# Patient Record
Sex: MALE | Race: OTHER | HISPANIC OR LATINO | URBAN - METROPOLITAN AREA
[De-identification: names, ages, dates, MRNs, and addresses within clinical notes are randomized per-mention and may not be internally consistent; named-entity substitution may affect disease eponyms.]

---

## 2017-01-12 ENCOUNTER — EMERGENCY (EMERGENCY)
Facility: HOSPITAL | Age: 37
LOS: 1 days | Discharge: HOME | End: 2017-01-12
Attending: EMERGENCY MEDICINE | Admitting: EMERGENCY MEDICINE
Payer: COMMERCIAL

## 2017-01-12 VITALS
DIASTOLIC BLOOD PRESSURE: 78 MMHG | RESPIRATION RATE: 18 BRPM | SYSTOLIC BLOOD PRESSURE: 126 MMHG | HEART RATE: 107 BPM | OXYGEN SATURATION: 97 % | HEIGHT: 71 IN | WEIGHT: 216.05 LBS | TEMPERATURE: 99 F

## 2017-01-12 DIAGNOSIS — R05 COUGH: ICD-10-CM

## 2017-01-12 LAB
ALBUMIN SERPL ELPH-MCNC: 2.2 G/DL — LOW (ref 3.3–5)
ALP SERPL-CCNC: 256 U/L — HIGH (ref 40–120)
ALT FLD-CCNC: 28 U/L RC — SIGNIFICANT CHANGE UP (ref 10–45)
ANION GAP SERPL CALC-SCNC: 14 MMOL/L — SIGNIFICANT CHANGE UP (ref 5–17)
APTT BLD: 33.4 SEC — SIGNIFICANT CHANGE UP (ref 27.5–37.4)
AST SERPL-CCNC: 23 U/L — SIGNIFICANT CHANGE UP (ref 10–40)
BASE EXCESS BLDV CALC-SCNC: 1.9 MMOL/L — SIGNIFICANT CHANGE UP (ref -2–2)
BASOPHILS # BLD AUTO: 0 K/UL — SIGNIFICANT CHANGE UP (ref 0–0.2)
BASOPHILS NFR BLD AUTO: 0.1 % — SIGNIFICANT CHANGE UP (ref 0–2)
BILIRUB SERPL-MCNC: 0.4 MG/DL — SIGNIFICANT CHANGE UP (ref 0.2–1.2)
BUN SERPL-MCNC: 19 MG/DL — SIGNIFICANT CHANGE UP (ref 7–23)
CA-I SERPL-SCNC: 1.13 MMOL/L — SIGNIFICANT CHANGE UP (ref 1.12–1.3)
CALCIUM SERPL-MCNC: 8.5 MG/DL — SIGNIFICANT CHANGE UP (ref 8.4–10.5)
CHLORIDE BLDV-SCNC: 102 MMOL/L — SIGNIFICANT CHANGE UP (ref 96–108)
CHLORIDE SERPL-SCNC: 98 MMOL/L — SIGNIFICANT CHANGE UP (ref 96–108)
CO2 BLDV-SCNC: 27 MMOL/L — SIGNIFICANT CHANGE UP (ref 22–30)
CO2 SERPL-SCNC: 24 MMOL/L — SIGNIFICANT CHANGE UP (ref 22–31)
CREAT SERPL-MCNC: 0.96 MG/DL — SIGNIFICANT CHANGE UP (ref 0.5–1.3)
EOSINOPHIL # BLD AUTO: 0 K/UL — SIGNIFICANT CHANGE UP (ref 0–0.5)
EOSINOPHIL NFR BLD AUTO: 0.2 % — SIGNIFICANT CHANGE UP (ref 0–6)
GAS PNL BLDV: 134 MMOL/L — LOW (ref 136–145)
GAS PNL BLDV: SIGNIFICANT CHANGE UP
GLUCOSE BLDV-MCNC: 104 MG/DL — HIGH (ref 70–99)
GLUCOSE SERPL-MCNC: 94 MG/DL — SIGNIFICANT CHANGE UP (ref 70–99)
HCO3 BLDV-SCNC: 26 MMOL/L — SIGNIFICANT CHANGE UP (ref 21–29)
HCOV 229E RNA SPEC QL NAA+PROBE: DETECTED
HCT VFR BLD CALC: 36.2 % — LOW (ref 39–50)
HCT VFR BLDA CALC: 35 % — LOW (ref 39–50)
HGB BLD CALC-MCNC: 11.2 G/DL — LOW (ref 13–17)
HGB BLD-MCNC: 11.9 G/DL — LOW (ref 13–17)
INR BLD: 1.63 RATIO — HIGH (ref 0.88–1.16)
LACTATE BLDV-MCNC: 1.5 MMOL/L — SIGNIFICANT CHANGE UP (ref 0.7–2)
LYMPHOCYTES # BLD AUTO: 1 K/UL — SIGNIFICANT CHANGE UP (ref 1–3.3)
LYMPHOCYTES # BLD AUTO: 7.9 % — LOW (ref 13–44)
MCHC RBC-ENTMCNC: 25.4 PG — LOW (ref 27–34)
MCHC RBC-ENTMCNC: 32.8 GM/DL — SIGNIFICANT CHANGE UP (ref 32–36)
MCV RBC AUTO: 77.4 FL — LOW (ref 80–100)
MONOCYTES # BLD AUTO: 0.8 K/UL — SIGNIFICANT CHANGE UP (ref 0–0.9)
MONOCYTES NFR BLD AUTO: 6.6 % — SIGNIFICANT CHANGE UP (ref 2–14)
NEUTROPHILS # BLD AUTO: 10.5 K/UL — HIGH (ref 1.8–7.4)
NEUTROPHILS NFR BLD AUTO: 85.2 % — HIGH (ref 43–77)
PCO2 BLDV: 41 MMHG — SIGNIFICANT CHANGE UP (ref 35–50)
PH BLDV: 7.42 — SIGNIFICANT CHANGE UP (ref 7.35–7.45)
PLATELET # BLD AUTO: 438 K/UL — HIGH (ref 150–400)
PO2 BLDV: 25 MMHG — SIGNIFICANT CHANGE UP (ref 25–45)
POTASSIUM BLDV-SCNC: 3.9 MMOL/L — SIGNIFICANT CHANGE UP (ref 3.5–5)
POTASSIUM SERPL-MCNC: 4.2 MMOL/L — SIGNIFICANT CHANGE UP (ref 3.5–5.3)
POTASSIUM SERPL-SCNC: 4.2 MMOL/L — SIGNIFICANT CHANGE UP (ref 3.5–5.3)
PROT SERPL-MCNC: 7.4 G/DL — SIGNIFICANT CHANGE UP (ref 6–8.3)
PROTHROM AB SERPL-ACNC: 17.9 SEC — HIGH (ref 10–13.1)
RAPID RVP RESULT: DETECTED
RBC # BLD: 4.68 M/UL — SIGNIFICANT CHANGE UP (ref 4.2–5.8)
RBC # FLD: 17.8 % — HIGH (ref 10.3–14.5)
SAO2 % BLDV: 37 % — LOW (ref 67–88)
SODIUM SERPL-SCNC: 136 MMOL/L — SIGNIFICANT CHANGE UP (ref 135–145)
WBC # BLD: 12.3 K/UL — HIGH (ref 3.8–10.5)
WBC # FLD AUTO: 12.3 K/UL — HIGH (ref 3.8–10.5)

## 2017-01-12 PROCEDURE — 71020: CPT | Mod: 26

## 2017-01-12 PROCEDURE — 93010 ELECTROCARDIOGRAM REPORT: CPT | Mod: NC

## 2017-01-12 PROCEDURE — 71275 CT ANGIOGRAPHY CHEST: CPT | Mod: 26

## 2017-01-12 PROCEDURE — 99285 EMERGENCY DEPT VISIT HI MDM: CPT | Mod: 25

## 2017-01-12 RX ORDER — SODIUM CHLORIDE 9 MG/ML
1000 INJECTION INTRAMUSCULAR; INTRAVENOUS; SUBCUTANEOUS ONCE
Qty: 0 | Refills: 0 | Status: COMPLETED | OUTPATIENT
Start: 2017-01-12 | End: 2017-01-12

## 2017-01-12 RX ORDER — IPRATROPIUM/ALBUTEROL SULFATE 18-103MCG
3 AEROSOL WITH ADAPTER (GRAM) INHALATION ONCE
Qty: 0 | Refills: 0 | Status: COMPLETED | OUTPATIENT
Start: 2017-01-12 | End: 2017-01-12

## 2017-01-12 RX ORDER — CEFTRIAXONE 500 MG/1
1 INJECTION, POWDER, FOR SOLUTION INTRAMUSCULAR; INTRAVENOUS ONCE
Qty: 0 | Refills: 0 | Status: COMPLETED | OUTPATIENT
Start: 2017-01-12 | End: 2017-01-12

## 2017-01-12 RX ORDER — AZITHROMYCIN 500 MG/1
500 TABLET, FILM COATED ORAL ONCE
Qty: 0 | Refills: 0 | Status: COMPLETED | OUTPATIENT
Start: 2017-01-12 | End: 2017-01-12

## 2017-01-12 RX ORDER — ACETAMINOPHEN 500 MG
650 TABLET ORAL ONCE
Qty: 0 | Refills: 0 | Status: COMPLETED | OUTPATIENT
Start: 2017-01-12 | End: 2017-01-12

## 2017-01-12 RX ORDER — KETOROLAC TROMETHAMINE 30 MG/ML
15 SYRINGE (ML) INJECTION ONCE
Qty: 0 | Refills: 0 | Status: DISCONTINUED | OUTPATIENT
Start: 2017-01-12 | End: 2017-01-12

## 2017-01-12 RX ADMIN — SODIUM CHLORIDE 1000 MILLILITER(S): 9 INJECTION INTRAMUSCULAR; INTRAVENOUS; SUBCUTANEOUS at 22:09

## 2017-01-12 RX ADMIN — CEFTRIAXONE 100 GRAM(S): 500 INJECTION, POWDER, FOR SOLUTION INTRAMUSCULAR; INTRAVENOUS at 22:07

## 2017-01-12 RX ADMIN — Medication 650 MILLIGRAM(S): at 22:07

## 2017-01-12 RX ADMIN — SODIUM CHLORIDE 3000 MILLILITER(S): 9 INJECTION INTRAMUSCULAR; INTRAVENOUS; SUBCUTANEOUS at 18:31

## 2017-01-12 RX ADMIN — AZITHROMYCIN 250 MILLIGRAM(S): 500 TABLET, FILM COATED ORAL at 22:51

## 2017-01-12 RX ADMIN — Medication 3 MILLILITER(S): at 18:31

## 2017-01-12 NOTE — ED PROVIDER NOTE - MEDICAL DECISION MAKING DETAILS
35 year old male patient with URI symptoms with multiple large lymph nodes. Concerning for lymphoma. Will check cbc, chest x-ray, give nebs and IV fluids for symptoms. Patient has follow up with PMD arranged and had recent admission with workup with lymphoma. Has not seen oncologist yet. Disposition pending.

## 2017-01-12 NOTE — ED PROVIDER NOTE - OBJECTIVE STATEMENT
36 year old male patient presents to the ED c/o shortness of breath, chest tightness, fever Tmax 101, worsening cough for a few days. Today, his coughing was worse than usual and it made breathing difficult and a sensation of chest tightness. Cough is productive with clear phlegm. Denies sick contacts. Patient has swollen lymph nodes. [facility] liver function tests and concern for lymphoma. Patient did not get flu vaccine this year. Patient reports extensive family history of lymphoma.  Denies muscle ache. 36 year old male patient presents to the ED c/o shortness of breath, chest tightness, sob fever Tmax 101, worsening cough for 3 days. Today, his coughing was worse than usual and it made breathing difficult and a sensation of chest tightness. Cough is productive with clear phlegm. Denies sick contacts. Patient has swollen lymph nodes. [facility] liver function tests and concern for lymphoma. Was supposed to follow-up with PMD tomorrow for eval and oncology referral. Patient did not get flu vaccine this year. Patient reports extensive family history of lymphoma.  Denies muscle ache, n/v/d, chest pain.

## 2017-01-12 NOTE — ED PROVIDER NOTE - CARE PLAN
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

## 2017-01-12 NOTE — ED PROVIDER NOTE - NS ED MD SCRIBE ATTENDING SCRIBE SECTIONS
PHYSICAL EXAM/HIV/REVIEW OF SYSTEMS/PAST MEDICAL/SURGICAL/SOCIAL HISTORY/VITAL SIGNS( Pullset)/DISPOSITION/RESULTS/HISTORY OF PRESENT ILLNESS

## 2017-01-12 NOTE — ED ADULT NURSE NOTE - OBJECTIVE STATEMENT
36y m pt c/o 5 days of cough; pt states had appt with pmd tomorrow but coughed so hard today he couldn't breathe; aox3; no resp distress; pt reports sob when coughing; pt denies abd pain; abd soft nontender nondistended; pt denies n/v/d; 36y m pt c/o 5 days of cough; pt states had appt with pmd tomorrow but coughed so hard today he couldn't breathe; aox3; no resp distress; pt reports sob when coughing; pt denies abd pain; abd soft nontender nondistended; pt denies n/v/d; pt reports productive cough with clear phlegm; pt denies sick contacts; has been worked up for swollen lymph nodes and elevated liver enzymes; wife at bedside; iv started; labs sent; skin intact

## 2017-01-12 NOTE — ED PROVIDER NOTE - PROGRESS NOTE DETAILS
Dr. Asher Note: s/o from Dr. Burnham pending labs and cxr.  CXR is abnormal showing b/l pleural effusion and pul infiltrate.  Will obtain CTA-chest, obtain further labs, and check rvp.  Pt has undifferentiated new dx lymphoma, no oncologist yet.  Pt and wife updated.  Will start empiric antibx in meantime and give gentle hydration given concern with effusions. Dr. Asher Note: pt feeling better, stable, reviewed ct scan, already has f/u for onc issues, do not suspect empyema, pt with +coronavirus, will give dose of decadron and benadryl for sxs relief, stable for dc home.

## 2017-01-13 VITALS
HEART RATE: 106 BPM | OXYGEN SATURATION: 97 % | DIASTOLIC BLOOD PRESSURE: 78 MMHG | SYSTOLIC BLOOD PRESSURE: 121 MMHG | RESPIRATION RATE: 18 BRPM

## 2017-01-13 PROCEDURE — 85610 PROTHROMBIN TIME: CPT

## 2017-01-13 PROCEDURE — 87486 CHLMYD PNEUM DNA AMP PROBE: CPT

## 2017-01-13 PROCEDURE — 94640 AIRWAY INHALATION TREATMENT: CPT

## 2017-01-13 PROCEDURE — 87798 DETECT AGENT NOS DNA AMP: CPT

## 2017-01-13 PROCEDURE — 87040 BLOOD CULTURE FOR BACTERIA: CPT

## 2017-01-13 PROCEDURE — 71046 X-RAY EXAM CHEST 2 VIEWS: CPT

## 2017-01-13 PROCEDURE — 71275 CT ANGIOGRAPHY CHEST: CPT

## 2017-01-13 PROCEDURE — 82435 ASSAY OF BLOOD CHLORIDE: CPT

## 2017-01-13 PROCEDURE — 85027 COMPLETE CBC AUTOMATED: CPT

## 2017-01-13 PROCEDURE — 82330 ASSAY OF CALCIUM: CPT

## 2017-01-13 PROCEDURE — 85730 THROMBOPLASTIN TIME PARTIAL: CPT

## 2017-01-13 PROCEDURE — 84295 ASSAY OF SERUM SODIUM: CPT

## 2017-01-13 PROCEDURE — 85014 HEMATOCRIT: CPT

## 2017-01-13 PROCEDURE — 82803 BLOOD GASES ANY COMBINATION: CPT

## 2017-01-13 PROCEDURE — 93005 ELECTROCARDIOGRAM TRACING: CPT

## 2017-01-13 PROCEDURE — 87581 M.PNEUMON DNA AMP PROBE: CPT

## 2017-01-13 PROCEDURE — 99284 EMERGENCY DEPT VISIT MOD MDM: CPT | Mod: 25

## 2017-01-13 PROCEDURE — 96375 TX/PRO/DX INJ NEW DRUG ADDON: CPT | Mod: XU

## 2017-01-13 PROCEDURE — 87633 RESP VIRUS 12-25 TARGETS: CPT

## 2017-01-13 PROCEDURE — 83605 ASSAY OF LACTIC ACID: CPT

## 2017-01-13 PROCEDURE — 84132 ASSAY OF SERUM POTASSIUM: CPT

## 2017-01-13 PROCEDURE — 96374 THER/PROPH/DIAG INJ IV PUSH: CPT | Mod: XU

## 2017-01-13 PROCEDURE — 82947 ASSAY GLUCOSE BLOOD QUANT: CPT

## 2017-01-13 PROCEDURE — 80053 COMPREHEN METABOLIC PANEL: CPT

## 2017-01-13 RX ORDER — LORATADINE, PSEUDOEPHEDRINE SULFATE 5; 120 MG/1; MG/1
1 TABLET, FILM COATED, EXTENDED RELEASE ORAL
Qty: 14 | Refills: 0 | OUTPATIENT
Start: 2017-01-13 | End: 2017-01-27

## 2017-01-13 RX ORDER — DEXAMETHASONE 0.5 MG/5ML
10 ELIXIR ORAL ONCE
Qty: 0 | Refills: 0 | Status: COMPLETED | OUTPATIENT
Start: 2017-01-13 | End: 2017-01-13

## 2017-01-13 RX ORDER — DIPHENHYDRAMINE HCL 50 MG
50 CAPSULE ORAL ONCE
Qty: 0 | Refills: 0 | Status: COMPLETED | OUTPATIENT
Start: 2017-01-13 | End: 2017-01-13

## 2017-01-13 RX ADMIN — Medication 50 MILLIGRAM(S): at 00:32

## 2017-01-13 RX ADMIN — Medication 650 MILLIGRAM(S): at 00:32

## 2017-01-13 RX ADMIN — Medication 10 MILLIGRAM(S): at 00:33

## 2017-01-13 NOTE — ED ADULT NURSE REASSESSMENT NOTE - NS ED NURSE REASSESS COMMENT FT1
Pt d/c w/wife at bedside, received written & verbal instructions, Pt verbalized understanding, Iv d/c, site clear no redness or swelling, a&ox4.

## 2017-01-18 LAB
CULTURE RESULTS: SIGNIFICANT CHANGE UP
CULTURE RESULTS: SIGNIFICANT CHANGE UP
SPECIMEN SOURCE: SIGNIFICANT CHANGE UP
SPECIMEN SOURCE: SIGNIFICANT CHANGE UP

## 2017-01-26 ENCOUNTER — INPATIENT (INPATIENT)
Facility: HOSPITAL | Age: 37
LOS: 14 days | Discharge: HOME | DRG: 824 | End: 2017-02-10
Attending: INTERNAL MEDICINE | Admitting: INTERNAL MEDICINE
Payer: COMMERCIAL

## 2017-01-26 VITALS
OXYGEN SATURATION: 94 % | TEMPERATURE: 99 F | DIASTOLIC BLOOD PRESSURE: 87 MMHG | RESPIRATION RATE: 24 BRPM | HEART RATE: 145 BPM | SYSTOLIC BLOOD PRESSURE: 124 MMHG

## 2017-01-26 DIAGNOSIS — C85.90 NON-HODGKIN LYMPHOMA, UNSPECIFIED, UNSPECIFIED SITE: ICD-10-CM

## 2017-01-26 DIAGNOSIS — J90 PLEURAL EFFUSION, NOT ELSEWHERE CLASSIFIED: ICD-10-CM

## 2017-01-26 DIAGNOSIS — D64.9 ANEMIA, UNSPECIFIED: ICD-10-CM

## 2017-01-26 DIAGNOSIS — Z41.8 ENCOUNTER FOR OTHER PROCEDURES FOR PURPOSES OTHER THAN REMEDYING HEALTH STATE: ICD-10-CM

## 2017-01-26 LAB
ALBUMIN SERPL ELPH-MCNC: 2.2 G/DL — LOW (ref 3.3–5)
ALP SERPL-CCNC: 208 U/L — HIGH (ref 40–120)
ALT FLD-CCNC: 29 U/L RC — SIGNIFICANT CHANGE UP (ref 10–45)
ANION GAP SERPL CALC-SCNC: 16 MMOL/L — SIGNIFICANT CHANGE UP (ref 5–17)
APTT BLD: 33 SEC — SIGNIFICANT CHANGE UP (ref 27.5–37.4)
AST SERPL-CCNC: 24 U/L — SIGNIFICANT CHANGE UP (ref 10–40)
BASOPHILS # BLD AUTO: 0 K/UL — SIGNIFICANT CHANGE UP (ref 0–0.2)
BASOPHILS NFR BLD AUTO: 0.1 % — SIGNIFICANT CHANGE UP (ref 0–2)
BILIRUB SERPL-MCNC: 0.3 MG/DL — SIGNIFICANT CHANGE UP (ref 0.2–1.2)
BLD GP AB SCN SERPL QL: NEGATIVE — SIGNIFICANT CHANGE UP
BUN SERPL-MCNC: 17 MG/DL — SIGNIFICANT CHANGE UP (ref 7–23)
CALCIUM SERPL-MCNC: 8.3 MG/DL — LOW (ref 8.4–10.5)
CHLORIDE SERPL-SCNC: 100 MMOL/L — SIGNIFICANT CHANGE UP (ref 96–108)
CO2 SERPL-SCNC: 23 MMOL/L — SIGNIFICANT CHANGE UP (ref 22–31)
CREAT SERPL-MCNC: 0.81 MG/DL — SIGNIFICANT CHANGE UP (ref 0.5–1.3)
EOSINOPHIL # BLD AUTO: 0 K/UL — SIGNIFICANT CHANGE UP (ref 0–0.5)
EOSINOPHIL NFR BLD AUTO: 0.1 % — SIGNIFICANT CHANGE UP (ref 0–6)
GAS PNL BLDV: SIGNIFICANT CHANGE UP
GLUCOSE SERPL-MCNC: 137 MG/DL — HIGH (ref 70–99)
HCT VFR BLD CALC: 38.6 % — LOW (ref 39–50)
HGB BLD-MCNC: 12.9 G/DL — LOW (ref 13–17)
INR BLD: 1.69 RATIO — HIGH (ref 0.88–1.16)
LYMPHOCYTES # BLD AUTO: 0.7 K/UL — LOW (ref 1–3.3)
LYMPHOCYTES # BLD AUTO: 4.2 % — LOW (ref 13–44)
MCHC RBC-ENTMCNC: 26.3 PG — LOW (ref 27–34)
MCHC RBC-ENTMCNC: 33.4 GM/DL — SIGNIFICANT CHANGE UP (ref 32–36)
MCV RBC AUTO: 78.7 FL — LOW (ref 80–100)
MONOCYTES # BLD AUTO: 0.8 K/UL — SIGNIFICANT CHANGE UP (ref 0–0.9)
MONOCYTES NFR BLD AUTO: 4.7 % — SIGNIFICANT CHANGE UP (ref 2–14)
NEUTROPHILS # BLD AUTO: 14.8 K/UL — HIGH (ref 1.8–7.4)
NEUTROPHILS NFR BLD AUTO: 90.9 % — HIGH (ref 43–77)
PLATELET # BLD AUTO: 533 K/UL — HIGH (ref 150–400)
POTASSIUM SERPL-MCNC: 4.4 MMOL/L — SIGNIFICANT CHANGE UP (ref 3.5–5.3)
POTASSIUM SERPL-SCNC: 4.4 MMOL/L — SIGNIFICANT CHANGE UP (ref 3.5–5.3)
PROT SERPL-MCNC: 7.1 G/DL — SIGNIFICANT CHANGE UP (ref 6–8.3)
PROTHROM AB SERPL-ACNC: 18.5 SEC — HIGH (ref 10–13.1)
RBC # BLD: 4.9 M/UL — SIGNIFICANT CHANGE UP (ref 4.2–5.8)
RBC # FLD: 18.5 % — HIGH (ref 10.3–14.5)
RH IG SCN BLD-IMP: POSITIVE — SIGNIFICANT CHANGE UP
RH IG SCN BLD-IMP: POSITIVE — SIGNIFICANT CHANGE UP
SODIUM SERPL-SCNC: 139 MMOL/L — SIGNIFICANT CHANGE UP (ref 135–145)
TROPONIN T SERPL-MCNC: <0.01 NG/ML — SIGNIFICANT CHANGE UP (ref 0–0.06)
WBC # BLD: 16.3 K/UL — HIGH (ref 3.8–10.5)
WBC # FLD AUTO: 16.3 K/UL — HIGH (ref 3.8–10.5)

## 2017-01-26 PROCEDURE — 93308 TTE F-UP OR LMTD: CPT | Mod: 26

## 2017-01-26 PROCEDURE — 71010: CPT | Mod: 26

## 2017-01-26 PROCEDURE — 99291 CRITICAL CARE FIRST HOUR: CPT | Mod: 25

## 2017-01-26 PROCEDURE — 99223 1ST HOSP IP/OBS HIGH 75: CPT

## 2017-01-26 PROCEDURE — 93010 ELECTROCARDIOGRAM REPORT: CPT

## 2017-01-26 PROCEDURE — 71275 CT ANGIOGRAPHY CHEST: CPT | Mod: 26

## 2017-01-26 RX ORDER — FUROSEMIDE 40 MG
40 TABLET ORAL ONCE
Qty: 0 | Refills: 0 | Status: COMPLETED | OUTPATIENT
Start: 2017-01-26 | End: 2017-01-26

## 2017-01-26 RX ORDER — ACETAMINOPHEN 500 MG
650 TABLET ORAL EVERY 6 HOURS
Qty: 0 | Refills: 0 | Status: DISCONTINUED | OUTPATIENT
Start: 2017-01-26 | End: 2017-01-31

## 2017-01-26 RX ORDER — ONDANSETRON 8 MG/1
4 TABLET, FILM COATED ORAL EVERY 6 HOURS
Qty: 0 | Refills: 0 | Status: DISCONTINUED | OUTPATIENT
Start: 2017-01-26 | End: 2017-01-31

## 2017-01-26 RX ORDER — ENOXAPARIN SODIUM 100 MG/ML
40 INJECTION SUBCUTANEOUS EVERY 24 HOURS
Qty: 0 | Refills: 0 | Status: DISCONTINUED | OUTPATIENT
Start: 2017-01-26 | End: 2017-01-31

## 2017-01-26 RX ADMIN — Medication 40 MILLIGRAM(S): at 18:05

## 2017-01-26 NOTE — ED PROVIDER NOTE - ENMT, MLM
Airway patent, Nasal mucosa clear. Mouth with normal mucosa. Throat has no vesicles, no oropharyngeal exudates and uvula is midline. Pale mucosas

## 2017-01-26 NOTE — H&P ADULT. - ASSESSMENT
36M w/ suspected diagnosis of lymphoma based on CTA who presents with constitutional symptoms c/w malignancy and pulmonary symptoms that are likely 2/2 his b/l PLEFs. Although the patient meets SIRS I suspect the tachycardia, leukocytosis and fevers as outpatient are 2/2 his likely malignancy. I have a low clinical suspicion for an infectious process. The duration of his fevers (months) points away from this. His axillary and posterior cervical LAD as well as the LAD seen on CT also are c/w malignancy. I suspect his anemia, low Alb, thrombocytosis are all related to the malignancy. Do not suspect that coronavirus is contributing at this point.

## 2017-01-26 NOTE — H&P ADULT. - FAMILY HISTORY
Sibling  Still living? Yes, Estimated age: Age Unknown  Family history of lymphoma, Age at diagnosis: Age Unknown

## 2017-01-26 NOTE — H&P ADULT. - NEGATIVE NEUROLOGICAL SYMPTOMS
no transient paralysis/no generalized seizures/no focal seizures/no loss of sensation/no paresthesias/no syncope/no difficulty walking

## 2017-01-26 NOTE — H&P ADULT. - CONSTITUTIONAL COMMENTS
Pt sitting up, tachypneic on 2L NC w/ mild accessory muscle use, able to speak in full sentences, in no apparent pain, mild temporal wasting, ill appearing overall

## 2017-01-26 NOTE — H&P ADULT. - PROBLEM SELECTOR PLAN 2
-Patient would likely benefit from diagnostic and therapeutic thoracentesis in am given current resp status  -c/w supplemental O2 for now  -does not meet criteria for acute hypoxic rep failure

## 2017-01-26 NOTE — H&P ADULT. - LYMPHATIC
posterior cervical L/anterior cervical L/supraclavicular L/anterior cervical R/supraclavicular R/posterior cervical R

## 2017-01-26 NOTE — H&P ADULT. - HISTORY OF PRESENT ILLNESS
36M w/ no PMHx, recent concern for lymphoma based on CTA Chest from 1/12/2017 (see below) who presents with ___  ED Triage Vitals: T:98.7, HR:145 (124-145), BP:124/87, RR:24, SpO2:94% on RA, pt placed on 2L NC in ED – SpO2:98% on 2L NC  ED Course: CXR, Lasix 40mg IVP x 1, CTA Chest, Admit to Tele  CTA Chest 1/12/2017: Impression: No pulmonary embolism. Infiltrative soft tissue involving the mediastinum and kaitlin, along with mediastinal, hilar, axillary, and retroperitoneal adenopathy. Bulky bilateral axillary lymphadenopathy. Large right and small to moderate left pleural effusions. 36M w/ no PMHx, recent concern for lymphoma based on CTA Chest from 1/12/2017 (see below) who presents with cough, fever, SOB, weight loss. The time course of the SOB and cough is days. The patient has a +FHx of lymphoma (bother, does not know details) and has night sweats and fevers for the past 6m w/ 20lb weight loss in the past 2m (unintentional). His last measured fever was 101 2 days ago. He came to the ED for weakness, decreased energy, "throwing up water" and feeling that he couldn't breath, RIDER. He has noticed LE swelling and has been unable to sleep laying flat. He denies sick contacts, asbestos exposure or recent travel (last travel was to [place] 2yrs ago). He was [place]. Since being informed about the possible malignancy diagnosis during his last ED visit the patient has not seen a Physician.  ED Triage Vitals: T:98.7, HR:145 (124-145), BP:124/87, RR:24, SpO2:94% on RA, pt placed on 2L NC in ED – SpO2:98% on 2L NC  ED Course: CXR, Lasix 40mg IVP x 1, CTA Chest, Admit to Tele  CTA Chest 1/12/2017: Impression: No pulmonary embolism. Infiltrative soft tissue involving the mediastinum and kaitlin, along with mediastinal, hilar, axillary, and retroperitoneal adenopathy. Bulky bilateral axillary lymphadenopathy. Large right and small to moderate left pleural effusions. 36M w/ no PMHx, recent concern for lymphoma based on CTA Chest from 1/12/2017 (see below) who presents with cough, fever, SOB, weight loss. The time course of the SOB and cough is days. The patient has a +FHx of lymphoma (bother, does not know details) and has night sweats and fevers for the past 6m w/ 20lb weight loss in the past 2m (unintentional) and palpable axillary LAD (nontender). His last measured fever was 101 2 days ago. He came to the ED for weakness, decreased energy, "throwing up water" and feeling that he couldn't breath, RIDER. He has noticed LE swelling and has been unable to sleep laying flat. He denies sick contacts, asbestos exposure or recent travel (last travel was to [place] 2yrs ago). He was born in the [place]. Since being informed about the possible malignancy diagnosis during his last ED visit the patient has not seen a Physician.  ED Triage Vitals: T:98.7, HR:145 (124-145), BP:124/87, RR:24, SpO2:94% on RA, pt placed on 2L NC in ED – SpO2:98% on 2L NC  ED Course: CXR, Lasix 40mg IVP x 1, CTA Chest, Admit to Tele  CTA Chest 1/12/2017: Impression: No pulmonary embolism. Infiltrative soft tissue involving the mediastinum and kaitlin, along with mediastinal, hilar, axillary, and retroperitoneal adenopathy. Bulky bilateral axillary lymphadenopathy. Large right and small to moderate left pleural effusions.

## 2017-01-26 NOTE — ED ADULT NURSE NOTE - OBJECTIVE STATEMENT
37 yo M with newly dx lymphoma and pleural effusions. 37 yo M with newly dx lymphoma and pleural effusion on last ED visit 1/13 PW worsening SOB. 35 yo M with newly dx lymphoma, pleural effusion and URI on last ED visit 1/13 PW worsening SOB. Pt c/o chest discomfort and difficulty breathing. Pt diaphoretic on arrival. Pt a&ox3, sinus tachycardia on the monitor. Lung sounds diminished. Afebrile. Skin color pale. Pt denies fevers/chills/abd pain.    MD at bedside with US, 2 18 gauge IVs in bilateral ACs. Normotensive at this time.

## 2017-01-26 NOTE — H&P ADULT. - RS GEN PE MLT RESP DETAILS PC
diminished breath sounds, L/diminished breath sounds, R/no intercostal retractions/breath sounds equal/airway patent/no rales/no rhonchi

## 2017-01-26 NOTE — ED PROVIDER NOTE - PROGRESS NOTE DETAILS
Bedside US shows large right pleural effusion moderate left sided effusion, hyperdynamic heart with moderate pericardial effusion with no evidence to tamponade. PT much improved with 500cc bolus and lasix. speaking in full sentences, 98 O2 sat on 2 L NC. Do not believe pt needs emergent tube thoracostomy. Spoke to hospitatlist and Dr. Medina. Pt is now admitted. Will continue to monitor. GPATEL

## 2017-01-26 NOTE — ED PROVIDER NOTE - OBJECTIVE STATEMENT
36 year old male concern recently for lymphoma given enlarged nodes, now with worse sob. Has not had a chance to see onc yet. Feeling weak and malaise.

## 2017-01-26 NOTE — H&P ADULT. - PROBLEM SELECTOR PLAN 3
-Suspect this is related to the malignancy  -Based on MCV and RWD possible Dx include Fe def and anemia of chronic inflammation - check Fe studies

## 2017-01-26 NOTE — H&P ADULT. - PROBLEM SELECTOR PLAN 1
-Given accessibility of LAD (axillary) would pursue Onc consult and tissue biopsy  -Need tissue Dx as next step, may also need further imaging to determine extent of LAD (would hold off repeat CT w/ contrast given pt just received contrast)  -Suspect malignancy is the etiology of the patient's fever - will hold off on ABxs for now - even if patient spike, unless clinical setting changes would defer ABxs but would send BCx, UA, UCx  -Check UA now for Prot given low Alb  -Check HIV (d/w patient), LDH, Quant Gold (pt born in )

## 2017-01-26 NOTE — H&P ADULT. - NEUROLOGICAL DETAILS
responds to pain/sensation intact/no spontaneous movement/alert and oriented x 3/responds to verbal commands

## 2017-01-26 NOTE — ED PROVIDER NOTE - ATTENDING CONTRIBUTION TO CARE
35 yo with extreme weakness and fatigue and sha for th epast few days, was seen here 2 weeks for respiratory issue and was diagnosed with large right and small left pleural effusions and mediastinal mass, was told to follow with onc but has not been able to, pt getting increasingly weak. EXAM: pt appears pale extremely weak appearing, diaphoretic, with decreased BS on the right, gauaic neg, tachycardia PLAN: likely sob 2ndary pleural effusion r.o low H/H pericardial effusion. labs, xray, Consider therapeutic tube thoracostomy for relief. Will continue to follow the patient closely

## 2017-01-26 NOTE — H&P ADULT. - GASTROINTESTINAL DETAILS
no bruit/no guarding/no masses palpable/no rigidity/soft/normal/bowel sounds normal/nontender/no distention/no rebound tenderness

## 2017-01-26 NOTE — H&P ADULT. - RADIOLOGY RESULTS AND INTERPRETATION
CXR: Personally reviewed: Prelim read: Small B/L pleural effusions, loculated on the R, with adjacent passive atelactasis. Underlying pna cannot be excluded.     CTA Chest: Suboptimal opacification of the pulmonary arterial tree. No main or central pulmonary embolism. Extensive thoracic and abdominal lymphoma. Large right and moderate sized left pleural effusion, slightly increased in size. Near complete collapse of the right lower lobe.

## 2017-01-26 NOTE — H&P ADULT. - LAB RESULTS AND INTERPRETATION
Personally reviewed: Leukocytosis (WBC:16.3, N:90.9%, 0.1% eos, no bands, lactate:1.6), Hb:12.9 (MCV:78.7 w/ RDW:18.5- c/w Fe def anemia – Fe studies ordered w/ am labs, prior Hb in [place] similar), Plt:533 (may be elevated 2/2 malignancy vs reactive), INR:1.69 (unclear etiology of elevation), elevated Alk Phos:208 (GGT ordered w/ am labs, B (unclear when blood was drawn in relation to patient eating), Alb:2.2 – suggests longer process, 2 Type and screens sent, Note: RVP + for coronavirus 2017 Personally reviewed: Leukocytosis (WBC:16.3, N:90.9%, 0.1% eos, no bands, lactate:1.6), Hb:12.9 (MCV:78.7 w/ RDW:18.5- c/w Fe def anemia vs anemia of chronic inflammation – Fe studies ordered w/ am labs, prior Hb in [place] similar), Plt:533 (may be elevated 2/2 malignancy vs reactive), INR:1.69 (unclear etiology of elevation), elevated Alk Phos:208 (GGT ordered w/ am labs, B (unclear when blood was drawn in relation to patient eating), Alb:2.2 – suggests longer process, 2 Type and screens sent, Note: RVP + for coronavirus 2017

## 2017-01-26 NOTE — H&P ADULT. - NEGATIVE ENMT SYMPTOMS
no hearing difficulty/no post-nasal discharge/no nasal discharge/no nasal obstruction/no nasal congestion/no sinus symptoms

## 2017-01-27 LAB
ALBUMIN FLD-MCNC: 1.5 G/DL — SIGNIFICANT CHANGE UP
ALBUMIN SERPL ELPH-MCNC: 1.3 G/DL — LOW (ref 3.3–5)
ALP SERPL-CCNC: 130 U/L — HIGH (ref 40–120)
ALT FLD-CCNC: 18 U/L — SIGNIFICANT CHANGE UP (ref 10–45)
ANION GAP SERPL CALC-SCNC: 9 MMOL/L — SIGNIFICANT CHANGE UP (ref 5–17)
APTT BLD: 26.4 SEC — LOW (ref 27.5–37.4)
AST SERPL-CCNC: 24 U/L — SIGNIFICANT CHANGE UP (ref 10–40)
B PERT IGG+IGM PNL SER: ABNORMAL
BASOPHILS # BLD AUTO: 0.01 K/UL — SIGNIFICANT CHANGE UP (ref 0–0.2)
BASOPHILS NFR BLD AUTO: 0.1 % — SIGNIFICANT CHANGE UP (ref 0–2)
BILIRUB SERPL-MCNC: 0.2 MG/DL — SIGNIFICANT CHANGE UP (ref 0.2–1.2)
BUN SERPL-MCNC: 14 MG/DL — SIGNIFICANT CHANGE UP (ref 7–23)
CALCIUM SERPL-MCNC: 5.5 MG/DL — CRITICAL LOW (ref 8.4–10.5)
CHLORIDE SERPL-SCNC: 113 MMOL/L — HIGH (ref 96–108)
CO2 SERPL-SCNC: 20 MMOL/L — LOW (ref 22–31)
COLOR FLD: YELLOW — SIGNIFICANT CHANGE UP
CREAT SERPL-MCNC: 0.52 MG/DL — SIGNIFICANT CHANGE UP (ref 0.5–1.3)
EOSINOPHIL # BLD AUTO: 0.06 K/UL — SIGNIFICANT CHANGE UP (ref 0–0.5)
EOSINOPHIL # FLD: 2 % — SIGNIFICANT CHANGE UP
EOSINOPHIL NFR BLD AUTO: 0.5 % — SIGNIFICANT CHANGE UP (ref 0–6)
FERRITIN SERPL-MCNC: 758.7 NG/ML — HIGH (ref 30–400)
FLUID INTAKE SUBSTANCE CLASS: SIGNIFICANT CHANGE UP
FLUID SEGMENTED GRANULOCYTES: 51 % — SIGNIFICANT CHANGE UP
GGT SERPL-CCNC: 46 U/L — SIGNIFICANT CHANGE UP (ref 9–50)
GLUCOSE FLD-MCNC: 133 — SIGNIFICANT CHANGE UP
GLUCOSE SERPL-MCNC: 94 MG/DL — SIGNIFICANT CHANGE UP (ref 70–99)
GRAM STN FLD: SIGNIFICANT CHANGE UP
HAV IGM SER-ACNC: SIGNIFICANT CHANGE UP
HBV CORE IGM SER-ACNC: SIGNIFICANT CHANGE UP
HBV SURFACE AG SER-ACNC: SIGNIFICANT CHANGE UP
HCT VFR BLD CALC: 38 % — LOW (ref 39–50)
HCV AB S/CO SERPL IA: 0.1 S/CO — SIGNIFICANT CHANGE UP
HCV AB SERPL-IMP: SIGNIFICANT CHANGE UP
HGB BLD-MCNC: 11.4 G/DL — LOW (ref 13–17)
HIV 1+2 AB+HIV1 P24 AG SERPL QL IA: SIGNIFICANT CHANGE UP
IMM GRANULOCYTES NFR BLD AUTO: 0.2 % — SIGNIFICANT CHANGE UP (ref 0–1.5)
INR BLD: 1.56 RATIO — HIGH (ref 0.88–1.16)
IRON SATN MFR SERPL: 16 % — SIGNIFICANT CHANGE UP (ref 16–55)
IRON SATN MFR SERPL: 17 UG/DL — LOW (ref 45–165)
LDH SERPL L TO P-CCNC: 110 U/L — SIGNIFICANT CHANGE UP
LYMPHOCYTES # BLD AUTO: 0.68 K/UL — LOW (ref 1–3.3)
LYMPHOCYTES # BLD AUTO: 5.5 % — LOW (ref 13–44)
LYMPHOCYTES # FLD: 38 % — SIGNIFICANT CHANGE UP
MAGNESIUM SERPL-MCNC: 1.4 MG/DL — LOW (ref 1.6–2.6)
MCHC RBC-ENTMCNC: 23.8 PG — LOW (ref 27–34)
MCHC RBC-ENTMCNC: 30 GM/DL — LOW (ref 32–36)
MCV RBC AUTO: 79.2 FL — LOW (ref 80–100)
MONOCYTES # BLD AUTO: 0.95 K/UL — HIGH (ref 0–0.9)
MONOCYTES NFR BLD AUTO: 7.7 % — SIGNIFICANT CHANGE UP (ref 2–14)
MONOS+MACROS # FLD: 9 % — SIGNIFICANT CHANGE UP
NEUTROPHILS # BLD AUTO: 10.62 K/UL — HIGH (ref 1.8–7.4)
NEUTROPHILS NFR BLD AUTO: 86 % — HIGH (ref 43–77)
PH FLD: 7.53 — SIGNIFICANT CHANGE UP
PHOSPHATE SERPL-MCNC: 3.5 MG/DL — SIGNIFICANT CHANGE UP (ref 2.5–4.5)
PLATELET # BLD AUTO: 533 K/UL — HIGH (ref 150–400)
POTASSIUM SERPL-MCNC: 3.1 MMOL/L — LOW (ref 3.5–5.3)
POTASSIUM SERPL-SCNC: 3.1 MMOL/L — LOW (ref 3.5–5.3)
PROT FLD-MCNC: 3.9 G/DL — SIGNIFICANT CHANGE UP
PROT SERPL-MCNC: 4.5 G/DL — LOW (ref 6–8.3)
PROTHROM AB SERPL-ACNC: 16.9 SEC — HIGH (ref 10–13.1)
RBC # BLD: 4.8 M/UL — SIGNIFICANT CHANGE UP (ref 4.2–5.8)
RBC # FLD: 19.5 % — HIGH (ref 10.3–14.5)
RCV VOL RI: 730 /UL — HIGH (ref 0–5)
SODIUM SERPL-SCNC: 142 MMOL/L — SIGNIFICANT CHANGE UP (ref 135–145)
SPECIMEN SOURCE: SIGNIFICANT CHANGE UP
TIBC SERPL-MCNC: 104 UG/DL — LOW (ref 220–430)
TOTAL NUCLEATED CELL COUNT, BODY FLUID: 81 /UL — HIGH (ref 0–5)
TRIGL FLD-MCNC: 47 MG/DL — SIGNIFICANT CHANGE UP
TUBE TYPE: SIGNIFICANT CHANGE UP
UIBC SERPL-MCNC: 87 UG/DL — LOW (ref 110–370)
URATE SERPL-MCNC: 2.6 MG/DL — LOW (ref 3.4–8.8)
WBC # BLD: 12.35 K/UL — HIGH (ref 3.8–10.5)
WBC # FLD AUTO: 12.35 K/UL — HIGH (ref 3.8–10.5)

## 2017-01-27 PROCEDURE — 99254 IP/OBS CNSLTJ NEW/EST MOD 60: CPT | Mod: GC

## 2017-01-27 PROCEDURE — 99254 IP/OBS CNSLTJ NEW/EST MOD 60: CPT

## 2017-01-27 PROCEDURE — 71010: CPT | Mod: 26

## 2017-01-27 PROCEDURE — 88305 TISSUE EXAM BY PATHOLOGIST: CPT | Mod: 26

## 2017-01-27 PROCEDURE — 88112 CYTOPATH CELL ENHANCE TECH: CPT | Mod: 26

## 2017-01-27 RX ORDER — MAGNESIUM SULFATE 500 MG/ML
2 VIAL (ML) INJECTION ONCE
Qty: 0 | Refills: 0 | Status: COMPLETED | OUTPATIENT
Start: 2017-01-27 | End: 2017-01-27

## 2017-01-27 RX ORDER — CALCIUM GLUCONATE 100 MG/ML
2 VIAL (ML) INTRAVENOUS ONCE
Qty: 0 | Refills: 0 | Status: COMPLETED | OUTPATIENT
Start: 2017-01-27 | End: 2017-01-28

## 2017-01-27 RX ORDER — POTASSIUM CHLORIDE 20 MEQ
40 PACKET (EA) ORAL ONCE
Qty: 0 | Refills: 0 | Status: COMPLETED | OUTPATIENT
Start: 2017-01-27 | End: 2017-01-27

## 2017-01-27 RX ORDER — POTASSIUM CHLORIDE 20 MEQ
10 PACKET (EA) ORAL
Qty: 0 | Refills: 0 | Status: COMPLETED | OUTPATIENT
Start: 2017-01-27 | End: 2017-01-27

## 2017-01-27 RX ORDER — INFLUENZA VIRUS VACCINE 15; 15; 15; 15 UG/.5ML; UG/.5ML; UG/.5ML; UG/.5ML
0.5 SUSPENSION INTRAMUSCULAR ONCE
Qty: 0 | Refills: 0 | Status: DISCONTINUED | OUTPATIENT
Start: 2017-01-27 | End: 2017-02-10

## 2017-01-27 RX ADMIN — Medication 40 MILLIEQUIVALENT(S): at 18:28

## 2017-01-27 RX ADMIN — Medication 50 GRAM(S): at 18:29

## 2017-01-27 RX ADMIN — Medication 100 MILLIEQUIVALENT(S): at 23:23

## 2017-01-27 RX ADMIN — Medication 100 MILLIEQUIVALENT(S): at 18:29

## 2017-01-27 RX ADMIN — Medication 100 MILLIEQUIVALENT(S): at 20:57

## 2017-01-28 LAB
ANION GAP SERPL CALC-SCNC: 13 MMOL/L — SIGNIFICANT CHANGE UP (ref 5–17)
APPEARANCE UR: CLEAR — SIGNIFICANT CHANGE UP
BASOPHILS # BLD AUTO: 0.01 K/UL — SIGNIFICANT CHANGE UP (ref 0–0.2)
BASOPHILS NFR BLD AUTO: 0.1 % — SIGNIFICANT CHANGE UP (ref 0–2)
BILIRUB UR-MCNC: NEGATIVE — SIGNIFICANT CHANGE UP
BUN SERPL-MCNC: 19 MG/DL — SIGNIFICANT CHANGE UP (ref 7–23)
CALCIUM SERPL-MCNC: 8.3 MG/DL — LOW (ref 8.4–10.5)
CHLORIDE SERPL-SCNC: 103 MMOL/L — SIGNIFICANT CHANGE UP (ref 96–108)
CO2 SERPL-SCNC: 23 MMOL/L — SIGNIFICANT CHANGE UP (ref 22–31)
COLOR SPEC: YELLOW — SIGNIFICANT CHANGE UP
CREAT SERPL-MCNC: 0.72 MG/DL — SIGNIFICANT CHANGE UP (ref 0.5–1.3)
DIFF PNL FLD: NEGATIVE — SIGNIFICANT CHANGE UP
EOSINOPHIL # BLD AUTO: 0.03 K/UL — SIGNIFICANT CHANGE UP (ref 0–0.5)
EOSINOPHIL NFR BLD AUTO: 0.3 % — SIGNIFICANT CHANGE UP (ref 0–6)
GLUCOSE SERPL-MCNC: 86 MG/DL — SIGNIFICANT CHANGE UP (ref 70–99)
GLUCOSE UR QL: NEGATIVE — SIGNIFICANT CHANGE UP
HCT VFR BLD CALC: 35.4 % — LOW (ref 39–50)
HGB BLD-MCNC: 11.2 G/DL — LOW (ref 13–17)
IMM GRANULOCYTES NFR BLD AUTO: 0.1 % — SIGNIFICANT CHANGE UP (ref 0–1.5)
KETONES UR-MCNC: NEGATIVE — SIGNIFICANT CHANGE UP
LDH SERPL L TO P-CCNC: 220 U/L — SIGNIFICANT CHANGE UP (ref 50–242)
LEUKOCYTE ESTERASE UR-ACNC: NEGATIVE — SIGNIFICANT CHANGE UP
LYMPHOCYTES # BLD AUTO: 0.51 K/UL — LOW (ref 1–3.3)
LYMPHOCYTES # BLD AUTO: 5.5 % — LOW (ref 13–44)
MCHC RBC-ENTMCNC: 24.9 PG — LOW (ref 27–34)
MCHC RBC-ENTMCNC: 31.6 GM/DL — LOW (ref 32–36)
MCV RBC AUTO: 78.7 FL — LOW (ref 80–100)
MONOCYTES # BLD AUTO: 0.7 K/UL — SIGNIFICANT CHANGE UP (ref 0–0.9)
MONOCYTES NFR BLD AUTO: 7.6 % — SIGNIFICANT CHANGE UP (ref 2–14)
NEUTROPHILS # BLD AUTO: 7.97 K/UL — HIGH (ref 1.8–7.4)
NEUTROPHILS NFR BLD AUTO: 86.4 % — HIGH (ref 43–77)
NIGHT BLUE STAIN TISS: SIGNIFICANT CHANGE UP
NITRITE UR-MCNC: NEGATIVE — SIGNIFICANT CHANGE UP
PH UR: 6 — SIGNIFICANT CHANGE UP (ref 4.8–8)
PLATELET # BLD AUTO: 489 K/UL — HIGH (ref 150–400)
POTASSIUM SERPL-MCNC: 4.5 MMOL/L — SIGNIFICANT CHANGE UP (ref 3.5–5.3)
POTASSIUM SERPL-SCNC: 4.5 MMOL/L — SIGNIFICANT CHANGE UP (ref 3.5–5.3)
PROT UR-MCNC: SIGNIFICANT CHANGE UP
RBC # BLD: 4.5 M/UL — SIGNIFICANT CHANGE UP (ref 4.2–5.8)
RBC # FLD: 19.2 % — HIGH (ref 10.3–14.5)
SODIUM SERPL-SCNC: 139 MMOL/L — SIGNIFICANT CHANGE UP (ref 135–145)
SP GR SPEC: 1.03 — HIGH (ref 1.01–1.02)
SPECIMEN SOURCE: SIGNIFICANT CHANGE UP
UROBILINOGEN FLD QL: NEGATIVE — SIGNIFICANT CHANGE UP
WBC # BLD: 9.23 K/UL — SIGNIFICANT CHANGE UP (ref 3.8–10.5)
WBC # FLD AUTO: 9.23 K/UL — SIGNIFICANT CHANGE UP (ref 3.8–10.5)

## 2017-01-28 RX ADMIN — ENOXAPARIN SODIUM 40 MILLIGRAM(S): 100 INJECTION SUBCUTANEOUS at 14:25

## 2017-01-28 RX ADMIN — Medication 100 MILLIGRAM(S): at 20:55

## 2017-01-28 RX ADMIN — Medication 100 GRAM(S): at 01:38

## 2017-01-28 RX ADMIN — Medication 650 MILLIGRAM(S): at 22:04

## 2017-01-28 RX ADMIN — Medication 650 MILLIGRAM(S): at 20:55

## 2017-01-28 NOTE — DIETITIAN INITIAL EVALUATION ADULT. - OTHER INFO
Patient referred for unintentional weight loss. Patient admits that he went to see a "nutritionist" when he wasn't feeling well and was told to cut out red meat and cheese.  Patient loves these foods.  Patient is also a  and had to stop working out due to not feeling well and has lost a lot of muscle.  Bitemporal wasting and buccal fat loss noted.  Patient reports face is much thinner.  Breakfast meal is 7 egg whites and 2 yolks with oatmeal and fruit.  Lunch is grilled chicken, vegetable, rice, Dinner is similar or may have fish.  patient is afraid to eat sugar and red meat because it may affect his health.  reinforced all foods can fit in. Recommended real food over protein powders.  Patient reports to be holding on to fluid and feels he is closer to 200 pounds or less.  Positive hunger and appetite.

## 2017-01-28 NOTE — DIETITIAN INITIAL EVALUATION ADULT. - ENERGY NEEDS
HT 71 inches,  pounds,  pounds,  pounds, BMI 29.1  Dxd with anemia, Pleural effusion, lymphoma.  Skin intact

## 2017-01-28 NOTE — DIETITIAN INITIAL EVALUATION ADULT. - NS AS NUTRI INTERV ED CONTENT
High calorie, moderate protein diet for anabolism with no restrictions, balanced meal planning encouraged./Recommended modifications/Purpose of the nutrition education

## 2017-01-29 LAB
ANION GAP SERPL CALC-SCNC: 16 MMOL/L — SIGNIFICANT CHANGE UP (ref 5–17)
BUN SERPL-MCNC: 19 MG/DL — SIGNIFICANT CHANGE UP (ref 7–23)
CALCIUM SERPL-MCNC: 8.5 MG/DL — SIGNIFICANT CHANGE UP (ref 8.4–10.5)
CHLORIDE SERPL-SCNC: 100 MMOL/L — SIGNIFICANT CHANGE UP (ref 96–108)
CHOLEST FLD-MCNC: 53 MG/DL — SIGNIFICANT CHANGE UP
CO2 SERPL-SCNC: 20 MMOL/L — LOW (ref 22–31)
CREAT SERPL-MCNC: 0.85 MG/DL — SIGNIFICANT CHANGE UP (ref 0.5–1.3)
CRYPTOC AG FLD QL: NEGATIVE — SIGNIFICANT CHANGE UP
GLUCOSE SERPL-MCNC: 100 MG/DL — HIGH (ref 70–99)
HCT VFR BLD CALC: 39 % — SIGNIFICANT CHANGE UP (ref 39–50)
HGB BLD-MCNC: 12.3 G/DL — LOW (ref 13–17)
LDH SERPL L TO P-CCNC: 527 U/L — HIGH (ref 50–242)
MAGNESIUM SERPL-MCNC: 1.8 MG/DL — SIGNIFICANT CHANGE UP (ref 1.6–2.6)
MCHC RBC-ENTMCNC: 24.4 PG — LOW (ref 27–34)
MCHC RBC-ENTMCNC: 31.5 GM/DL — LOW (ref 32–36)
MCV RBC AUTO: 77.2 FL — LOW (ref 80–100)
PHOSPHATE SERPL-MCNC: 4.1 MG/DL — SIGNIFICANT CHANGE UP (ref 2.5–4.5)
PLATELET # BLD AUTO: 513 K/UL — HIGH (ref 150–400)
POTASSIUM SERPL-MCNC: 4.4 MMOL/L — SIGNIFICANT CHANGE UP (ref 3.5–5.3)
POTASSIUM SERPL-SCNC: 4.4 MMOL/L — SIGNIFICANT CHANGE UP (ref 3.5–5.3)
RBC # BLD: 5.05 M/UL — SIGNIFICANT CHANGE UP (ref 4.2–5.8)
RBC # FLD: 19.2 % — HIGH (ref 10.3–14.5)
SODIUM SERPL-SCNC: 136 MMOL/L — SIGNIFICANT CHANGE UP (ref 135–145)
URATE SERPL-MCNC: 3.9 MG/DL — SIGNIFICANT CHANGE UP (ref 3.4–8.8)
WBC # BLD: 15.12 K/UL — HIGH (ref 3.8–10.5)
WBC # FLD AUTO: 15.12 K/UL — HIGH (ref 3.8–10.5)

## 2017-01-29 PROCEDURE — 99222 1ST HOSP IP/OBS MODERATE 55: CPT | Mod: 57

## 2017-01-29 PROCEDURE — 71010: CPT | Mod: 26

## 2017-01-29 PROCEDURE — 99233 SBSQ HOSP IP/OBS HIGH 50: CPT

## 2017-01-29 PROCEDURE — 93010 ELECTROCARDIOGRAM REPORT: CPT

## 2017-01-29 RX ADMIN — Medication 100 MILLIGRAM(S): at 22:55

## 2017-01-29 RX ADMIN — Medication 650 MILLIGRAM(S): at 04:37

## 2017-01-29 RX ADMIN — Medication 650 MILLIGRAM(S): at 21:31

## 2017-01-29 RX ADMIN — ENOXAPARIN SODIUM 40 MILLIGRAM(S): 100 INJECTION SUBCUTANEOUS at 13:47

## 2017-01-29 RX ADMIN — Medication 650 MILLIGRAM(S): at 14:49

## 2017-01-30 LAB
ANION GAP SERPL CALC-SCNC: 12 MMOL/L — SIGNIFICANT CHANGE UP (ref 5–17)
APTT BLD: 38.1 SEC — HIGH (ref 27.5–37.4)
BLD GP AB SCN SERPL QL: NEGATIVE — SIGNIFICANT CHANGE UP
BUN SERPL-MCNC: 19 MG/DL — SIGNIFICANT CHANGE UP (ref 7–23)
CALCIUM SERPL-MCNC: 8.7 MG/DL — SIGNIFICANT CHANGE UP (ref 8.4–10.5)
CHLORIDE SERPL-SCNC: 104 MMOL/L — SIGNIFICANT CHANGE UP (ref 96–108)
CO2 SERPL-SCNC: 24 MMOL/L — SIGNIFICANT CHANGE UP (ref 22–31)
CREAT SERPL-MCNC: 0.81 MG/DL — SIGNIFICANT CHANGE UP (ref 0.5–1.3)
EBV EA AB SER IA-ACNC: <5 U/ML — SIGNIFICANT CHANGE UP
EBV EA AB TITR SER IF: POSITIVE
EBV EA IGG SER-ACNC: NEGATIVE — SIGNIFICANT CHANGE UP
EBV NA IGG SER IA-ACNC: 481 U/ML — HIGH
EBV PATRN SPEC IB-IMP: SIGNIFICANT CHANGE UP
EBV VCA IGG AVIDITY SER QL IA: POSITIVE
EBV VCA IGM SER IA-ACNC: 404 U/ML — HIGH
EBV VCA IGM SER IA-ACNC: <10 U/ML — SIGNIFICANT CHANGE UP
EBV VCA IGM TITR FLD: NEGATIVE — SIGNIFICANT CHANGE UP
GLUCOSE SERPL-MCNC: 112 MG/DL — HIGH (ref 70–99)
HCOV 229E RNA SPEC QL NAA+PROBE: DETECTED
HCT VFR BLD CALC: 38.6 % — LOW (ref 39–50)
HGB BLD-MCNC: 12 G/DL — LOW (ref 13–17)
INR BLD: 1.64 RATIO — HIGH (ref 0.88–1.16)
LDH SERPL L TO P-CCNC: 174 U/L — SIGNIFICANT CHANGE UP (ref 50–242)
M TB TUBERC IFN-G BLD QL: 0 IU/ML — SIGNIFICANT CHANGE UP
M TB TUBERC IFN-G BLD QL: 0.04 IU/ML — SIGNIFICANT CHANGE UP
M TB TUBERC IFN-G BLD QL: ABNORMAL
MCHC RBC-ENTMCNC: 24.2 PG — LOW (ref 27–34)
MCHC RBC-ENTMCNC: 31.1 GM/DL — LOW (ref 32–36)
MCV RBC AUTO: 77.8 FL — LOW (ref 80–100)
MITOGEN IGNF BCKGRD COR BLD-ACNC: 0.18 IU/ML — SIGNIFICANT CHANGE UP
NON-GYNECOLOGICAL CYTOLOGY STUDY: SIGNIFICANT CHANGE UP
PHOSPHATE SERPL-MCNC: 4.2 MG/DL — SIGNIFICANT CHANGE UP (ref 2.5–4.5)
PLATELET # BLD AUTO: 457 K/UL — HIGH (ref 150–400)
POTASSIUM SERPL-MCNC: 4.1 MMOL/L — SIGNIFICANT CHANGE UP (ref 3.5–5.3)
POTASSIUM SERPL-SCNC: 4.1 MMOL/L — SIGNIFICANT CHANGE UP (ref 3.5–5.3)
PROTHROM AB SERPL-ACNC: 18.6 SEC — HIGH (ref 10–13.1)
RAPID RVP RESULT: DETECTED
RBC # BLD: 4.96 M/UL — SIGNIFICANT CHANGE UP (ref 4.2–5.8)
RBC # FLD: 19 % — HIGH (ref 10.3–14.5)
RH IG SCN BLD-IMP: POSITIVE — SIGNIFICANT CHANGE UP
SODIUM SERPL-SCNC: 140 MMOL/L — SIGNIFICANT CHANGE UP (ref 135–145)
WBC # BLD: 12.45 K/UL — HIGH (ref 3.8–10.5)
WBC # FLD AUTO: 12.45 K/UL — HIGH (ref 3.8–10.5)

## 2017-01-30 PROCEDURE — 71010: CPT | Mod: 26

## 2017-01-30 PROCEDURE — 99232 SBSQ HOSP IP/OBS MODERATE 35: CPT | Mod: 57

## 2017-01-30 PROCEDURE — 99232 SBSQ HOSP IP/OBS MODERATE 35: CPT

## 2017-01-30 PROCEDURE — 93971 EXTREMITY STUDY: CPT | Mod: 26

## 2017-01-30 RX ORDER — IBUPROFEN 200 MG
600 TABLET ORAL ONCE
Qty: 0 | Refills: 0 | Status: COMPLETED | OUTPATIENT
Start: 2017-01-30 | End: 2017-01-30

## 2017-01-30 RX ADMIN — ENOXAPARIN SODIUM 40 MILLIGRAM(S): 100 INJECTION SUBCUTANEOUS at 13:40

## 2017-01-30 RX ADMIN — Medication 600 MILLIGRAM(S): at 00:13

## 2017-01-30 RX ADMIN — Medication 600 MILLIGRAM(S): at 18:25

## 2017-01-30 RX ADMIN — Medication 600 MILLIGRAM(S): at 20:59

## 2017-01-30 RX ADMIN — Medication 600 MILLIGRAM(S): at 01:18

## 2017-01-31 LAB
ANION GAP SERPL CALC-SCNC: 15 MMOL/L — SIGNIFICANT CHANGE UP (ref 5–17)
APPEARANCE UR: CLEAR — SIGNIFICANT CHANGE UP
APTT BLD: 38.4 SEC — HIGH (ref 27.5–37.4)
BILIRUB UR-MCNC: NEGATIVE — SIGNIFICANT CHANGE UP
BUN SERPL-MCNC: 23 MG/DL — SIGNIFICANT CHANGE UP (ref 7–23)
CALCIUM SERPL-MCNC: 8.3 MG/DL — LOW (ref 8.4–10.5)
CHLORIDE SERPL-SCNC: 109 MMOL/L — HIGH (ref 96–108)
CO2 SERPL-SCNC: 24 MMOL/L — SIGNIFICANT CHANGE UP (ref 22–31)
COLOR SPEC: YELLOW — SIGNIFICANT CHANGE UP
CREAT SERPL-MCNC: 0.61 MG/DL — SIGNIFICANT CHANGE UP (ref 0.5–1.3)
DIFF PNL FLD: NEGATIVE — SIGNIFICANT CHANGE UP
GLUCOSE SERPL-MCNC: 92 MG/DL — SIGNIFICANT CHANGE UP (ref 70–99)
GLUCOSE UR QL: NEGATIVE MG/DL — SIGNIFICANT CHANGE UP
H CAPSUL AG SPEC-ACNC: SIGNIFICANT CHANGE UP
H CAPSUL AG UR QL IA: SIGNIFICANT CHANGE UP
H CAPSUL MYC AB FLD-ACNC: SIGNIFICANT CHANGE UP
H CAPSUL YST AB FLD-ACNC: SIGNIFICANT CHANGE UP
HCT VFR BLD CALC: 34.3 % — LOW (ref 39–50)
HGB BLD-MCNC: 10.5 G/DL — LOW (ref 13–17)
INR BLD: 1.54 RATIO — HIGH (ref 0.88–1.16)
KETONES UR-MCNC: NEGATIVE — SIGNIFICANT CHANGE UP
LDH SERPL L TO P-CCNC: 284 U/L — HIGH (ref 50–242)
LEUKOCYTE ESTERASE UR-ACNC: NEGATIVE — SIGNIFICANT CHANGE UP
MCHC RBC-ENTMCNC: 23.8 PG — LOW (ref 27–34)
MCHC RBC-ENTMCNC: 30.6 GM/DL — LOW (ref 32–36)
MCV RBC AUTO: 77.8 FL — LOW (ref 80–100)
NITRITE UR-MCNC: NEGATIVE — SIGNIFICANT CHANGE UP
PH UR: 5.5 — SIGNIFICANT CHANGE UP (ref 5–8)
PLATELET # BLD AUTO: 453 K/UL — HIGH (ref 150–400)
POTASSIUM SERPL-MCNC: 4.5 MMOL/L — SIGNIFICANT CHANGE UP (ref 3.5–5.3)
POTASSIUM SERPL-SCNC: 4.5 MMOL/L — SIGNIFICANT CHANGE UP (ref 3.5–5.3)
PROT UR-MCNC: NEGATIVE MG/DL — SIGNIFICANT CHANGE UP
PROTHROM AB SERPL-ACNC: 17.5 SEC — HIGH (ref 10–13.1)
RBC # BLD: 4.41 M/UL — SIGNIFICANT CHANGE UP (ref 4.2–5.8)
RBC # FLD: 18.9 % — HIGH (ref 10.3–14.5)
SODIUM SERPL-SCNC: 148 MMOL/L — HIGH (ref 135–145)
SP GR SPEC: 1.03 — HIGH (ref 1.01–1.02)
UROBILINOGEN FLD QL: NEGATIVE MG/DL — SIGNIFICANT CHANGE UP
WBC # BLD: 11.77 K/UL — HIGH (ref 3.8–10.5)
WBC # FLD AUTO: 11.77 K/UL — HIGH (ref 3.8–10.5)

## 2017-01-31 PROCEDURE — 88360 TUMOR IMMUNOHISTOCHEM/MANUAL: CPT | Mod: 26

## 2017-01-31 PROCEDURE — 88341 IMHCHEM/IMCYTCHM EA ADD ANTB: CPT | Mod: 26,59

## 2017-01-31 PROCEDURE — 88342 IMHCHEM/IMCYTCHM 1ST ANTB: CPT | Mod: 26,59

## 2017-01-31 PROCEDURE — 12032 INTMD RPR S/A/T/EXT 2.6-7.5: CPT | Mod: 59

## 2017-01-31 PROCEDURE — 38525 BIOPSY/REMOVAL LYMPH NODES: CPT

## 2017-01-31 PROCEDURE — 88307 TISSUE EXAM BY PATHOLOGIST: CPT | Mod: 26

## 2017-01-31 PROCEDURE — 88365 INSITU HYBRIDIZATION (FISH): CPT | Mod: 26

## 2017-01-31 PROCEDURE — 88333 PATH CONSLTJ SURG CYTO XM 1: CPT | Mod: 26

## 2017-01-31 PROCEDURE — 99232 SBSQ HOSP IP/OBS MODERATE 35: CPT

## 2017-01-31 RX ORDER — IBUPROFEN 200 MG
600 TABLET ORAL ONCE
Qty: 0 | Refills: 0 | Status: COMPLETED | OUTPATIENT
Start: 2017-01-31 | End: 2017-01-31

## 2017-01-31 RX ORDER — ENOXAPARIN SODIUM 100 MG/ML
40 INJECTION SUBCUTANEOUS DAILY
Qty: 0 | Refills: 0 | Status: DISCONTINUED | OUTPATIENT
Start: 2017-01-31 | End: 2017-02-10

## 2017-01-31 RX ORDER — SODIUM CHLORIDE 9 MG/ML
1000 INJECTION, SOLUTION INTRAVENOUS
Qty: 0 | Refills: 0 | Status: DISCONTINUED | OUTPATIENT
Start: 2017-01-31 | End: 2017-01-31

## 2017-01-31 RX ORDER — IBUPROFEN 200 MG
400 TABLET ORAL EVERY 6 HOURS
Qty: 0 | Refills: 0 | Status: COMPLETED | OUTPATIENT
Start: 2017-01-31 | End: 2017-02-02

## 2017-01-31 RX ORDER — ACETAMINOPHEN 500 MG
650 TABLET ORAL EVERY 6 HOURS
Qty: 0 | Refills: 0 | Status: DISCONTINUED | OUTPATIENT
Start: 2017-01-31 | End: 2017-02-10

## 2017-01-31 RX ORDER — HYDROMORPHONE HYDROCHLORIDE 2 MG/ML
0.5 INJECTION INTRAMUSCULAR; INTRAVENOUS; SUBCUTANEOUS
Qty: 0 | Refills: 0 | Status: DISCONTINUED | OUTPATIENT
Start: 2017-01-31 | End: 2017-01-31

## 2017-01-31 RX ORDER — SODIUM CHLORIDE 9 MG/ML
500 INJECTION, SOLUTION INTRAVENOUS
Qty: 0 | Refills: 0 | Status: DISCONTINUED | OUTPATIENT
Start: 2017-01-31 | End: 2017-02-02

## 2017-01-31 RX ADMIN — ENOXAPARIN SODIUM 40 MILLIGRAM(S): 100 INJECTION SUBCUTANEOUS at 22:33

## 2017-01-31 RX ADMIN — Medication 600 MILLIGRAM(S): at 18:25

## 2017-01-31 RX ADMIN — SODIUM CHLORIDE 100 MILLILITER(S): 9 INJECTION, SOLUTION INTRAVENOUS at 05:21

## 2017-01-31 RX ADMIN — Medication 600 MILLIGRAM(S): at 18:56

## 2017-01-31 RX ADMIN — SODIUM CHLORIDE 50 MILLILITER(S): 9 INJECTION, SOLUTION INTRAVENOUS at 22:30

## 2017-02-01 ENCOUNTER — RESULT REVIEW (OUTPATIENT)
Age: 37
End: 2017-02-01

## 2017-02-01 LAB
ANION GAP SERPL CALC-SCNC: 12 MMOL/L — SIGNIFICANT CHANGE UP (ref 5–17)
BUN SERPL-MCNC: 21 MG/DL — SIGNIFICANT CHANGE UP (ref 7–23)
CALCIUM SERPL-MCNC: 7.8 MG/DL — LOW (ref 8.4–10.5)
CHLORIDE SERPL-SCNC: 103 MMOL/L — SIGNIFICANT CHANGE UP (ref 96–108)
CO2 SERPL-SCNC: 25 MMOL/L — SIGNIFICANT CHANGE UP (ref 22–31)
CREAT SERPL-MCNC: 0.79 MG/DL — SIGNIFICANT CHANGE UP (ref 0.5–1.3)
CULTURE RESULTS: SIGNIFICANT CHANGE UP
CULTURE RESULTS: SIGNIFICANT CHANGE UP
GLUCOSE SERPL-MCNC: 132 MG/DL — HIGH (ref 70–99)
HCT VFR BLD CALC: 28 % — LOW (ref 39–50)
HCT VFR BLD CALC: 33.8 % — LOW (ref 39–50)
HGB BLD-MCNC: 10.7 G/DL — LOW (ref 13–17)
HGB BLD-MCNC: 8.3 G/DL — LOW (ref 13–17)
LDH SERPL L TO P-CCNC: 293 U/L — HIGH (ref 50–242)
MCHC RBC-ENTMCNC: 23.2 PG — LOW (ref 27–34)
MCHC RBC-ENTMCNC: 24.9 PG — LOW (ref 27–34)
MCHC RBC-ENTMCNC: 29.6 GM/DL — LOW (ref 32–36)
MCHC RBC-ENTMCNC: 31.6 GM/DL — LOW (ref 32–36)
MCV RBC AUTO: 78.2 FL — LOW (ref 80–100)
MCV RBC AUTO: 78.6 FL — LOW (ref 80–100)
PLATELET # BLD AUTO: 476 K/UL — HIGH (ref 150–400)
PLATELET # BLD AUTO: 510 K/UL — HIGH (ref 150–400)
POTASSIUM SERPL-MCNC: 4.1 MMOL/L — SIGNIFICANT CHANGE UP (ref 3.5–5.3)
POTASSIUM SERPL-SCNC: 4.1 MMOL/L — SIGNIFICANT CHANGE UP (ref 3.5–5.3)
RBC # BLD: 3.58 M/UL — LOW (ref 4.2–5.8)
RBC # BLD: 4.3 M/UL — SIGNIFICANT CHANGE UP (ref 4.2–5.8)
RBC # FLD: 17.3 % — HIGH (ref 10.3–14.5)
RBC # FLD: 18.7 % — HIGH (ref 10.3–14.5)
SODIUM SERPL-SCNC: 140 MMOL/L — SIGNIFICANT CHANGE UP (ref 135–145)
SPECIMEN SOURCE: SIGNIFICANT CHANGE UP
SPECIMEN SOURCE: SIGNIFICANT CHANGE UP
URATE SERPL-MCNC: 3.3 MG/DL — LOW (ref 3.4–8.8)
WBC # BLD: 12.31 K/UL — HIGH (ref 3.8–10.5)
WBC # BLD: 8.9 K/UL — SIGNIFICANT CHANGE UP (ref 3.8–10.5)
WBC # FLD AUTO: 12.31 K/UL — HIGH (ref 3.8–10.5)
WBC # FLD AUTO: 8.9 K/UL — SIGNIFICANT CHANGE UP (ref 3.8–10.5)

## 2017-02-01 PROCEDURE — 99232 SBSQ HOSP IP/OBS MODERATE 35: CPT

## 2017-02-01 RX ADMIN — Medication 400 MILLIGRAM(S): at 05:17

## 2017-02-01 RX ADMIN — Medication 400 MILLIGRAM(S): at 14:45

## 2017-02-01 RX ADMIN — Medication 400 MILLIGRAM(S): at 18:56

## 2017-02-01 RX ADMIN — Medication 400 MILLIGRAM(S): at 23:47

## 2017-02-01 RX ADMIN — Medication 400 MILLIGRAM(S): at 14:15

## 2017-02-01 RX ADMIN — Medication 400 MILLIGRAM(S): at 05:45

## 2017-02-02 LAB
ANION GAP SERPL CALC-SCNC: 9 MMOL/L — SIGNIFICANT CHANGE UP (ref 5–17)
BUN SERPL-MCNC: 23 MG/DL — SIGNIFICANT CHANGE UP (ref 7–23)
CALCIUM SERPL-MCNC: 7.9 MG/DL — LOW (ref 8.4–10.5)
CHLORIDE SERPL-SCNC: 105 MMOL/L — SIGNIFICANT CHANGE UP (ref 96–108)
CO2 SERPL-SCNC: 26 MMOL/L — SIGNIFICANT CHANGE UP (ref 22–31)
CREAT SERPL-MCNC: 0.61 MG/DL — SIGNIFICANT CHANGE UP (ref 0.5–1.3)
GLUCOSE SERPL-MCNC: 78 MG/DL — SIGNIFICANT CHANGE UP (ref 70–99)
HCT VFR BLD CALC: 34.6 % — LOW (ref 39–50)
HGB BLD-MCNC: 10.3 G/DL — LOW (ref 13–17)
MCHC RBC-ENTMCNC: 23.4 PG — LOW (ref 27–34)
MCHC RBC-ENTMCNC: 29.8 GM/DL — LOW (ref 32–36)
MCV RBC AUTO: 78.5 FL — LOW (ref 80–100)
PLATELET # BLD AUTO: 469 K/UL — HIGH (ref 150–400)
POTASSIUM SERPL-MCNC: 4 MMOL/L — SIGNIFICANT CHANGE UP (ref 3.5–5.3)
POTASSIUM SERPL-SCNC: 4 MMOL/L — SIGNIFICANT CHANGE UP (ref 3.5–5.3)
RBC # BLD: 4.41 M/UL — SIGNIFICANT CHANGE UP (ref 4.2–5.8)
RBC # FLD: 18.5 % — HIGH (ref 10.3–14.5)
SODIUM SERPL-SCNC: 140 MMOL/L — SIGNIFICANT CHANGE UP (ref 135–145)
WBC # BLD: 7.15 K/UL — SIGNIFICANT CHANGE UP (ref 3.8–10.5)
WBC # FLD AUTO: 7.15 K/UL — SIGNIFICANT CHANGE UP (ref 3.8–10.5)

## 2017-02-02 PROCEDURE — 88342 IMHCHEM/IMCYTCHM 1ST ANTB: CPT | Mod: 26

## 2017-02-02 PROCEDURE — 71010: CPT | Mod: 26

## 2017-02-02 PROCEDURE — 74177 CT ABD & PELVIS W/CONTRAST: CPT | Mod: 26

## 2017-02-02 PROCEDURE — 85097 BONE MARROW INTERPRETATION: CPT

## 2017-02-02 PROCEDURE — 78472 GATED HEART PLANAR SINGLE: CPT | Mod: 26

## 2017-02-02 PROCEDURE — 88305 TISSUE EXAM BY PATHOLOGIST: CPT | Mod: 26

## 2017-02-02 PROCEDURE — 88313 SPECIAL STAINS GROUP 2: CPT | Mod: 26

## 2017-02-02 RX ORDER — ALLOPURINOL 300 MG
300 TABLET ORAL DAILY
Qty: 0 | Refills: 0 | Status: DISCONTINUED | OUTPATIENT
Start: 2017-02-02 | End: 2017-02-10

## 2017-02-02 RX ADMIN — ENOXAPARIN SODIUM 40 MILLIGRAM(S): 100 INJECTION SUBCUTANEOUS at 05:26

## 2017-02-02 RX ADMIN — Medication 400 MILLIGRAM(S): at 19:26

## 2017-02-02 RX ADMIN — Medication 400 MILLIGRAM(S): at 19:01

## 2017-02-02 RX ADMIN — Medication 400 MILLIGRAM(S): at 14:11

## 2017-02-02 RX ADMIN — Medication 400 MILLIGRAM(S): at 16:31

## 2017-02-02 RX ADMIN — Medication 400 MILLIGRAM(S): at 00:23

## 2017-02-02 RX ADMIN — Medication 400 MILLIGRAM(S): at 19:31

## 2017-02-02 RX ADMIN — Medication 400 MILLIGRAM(S): at 06:00

## 2017-02-02 RX ADMIN — Medication 400 MILLIGRAM(S): at 05:26

## 2017-02-02 RX ADMIN — Medication 300 MILLIGRAM(S): at 19:02

## 2017-02-03 LAB
ANION GAP SERPL CALC-SCNC: 12 MMOL/L — SIGNIFICANT CHANGE UP (ref 5–17)
APTT BLD: 34.8 SEC — SIGNIFICANT CHANGE UP (ref 27.5–37.4)
BUN SERPL-MCNC: 17 MG/DL — SIGNIFICANT CHANGE UP (ref 7–23)
CALCIUM SERPL-MCNC: 8.2 MG/DL — LOW (ref 8.4–10.5)
CHLORIDE SERPL-SCNC: 104 MMOL/L — SIGNIFICANT CHANGE UP (ref 96–108)
CO2 SERPL-SCNC: 23 MMOL/L — SIGNIFICANT CHANGE UP (ref 22–31)
CREAT SERPL-MCNC: 0.54 MG/DL — SIGNIFICANT CHANGE UP (ref 0.5–1.3)
CULTURE RESULTS: SIGNIFICANT CHANGE UP
CULTURE RESULTS: SIGNIFICANT CHANGE UP
ERYTHROCYTE [SEDIMENTATION RATE] IN BLOOD: 92 MM/HR — HIGH (ref 0–15)
GLUCOSE SERPL-MCNC: 80 MG/DL — SIGNIFICANT CHANGE UP (ref 70–99)
HCT VFR BLD CALC: 34.9 % — LOW (ref 39–50)
HGB BLD-MCNC: 11 G/DL — LOW (ref 13–17)
INR BLD: 1.43 RATIO — HIGH (ref 0.88–1.16)
LDH SERPL L TO P-CCNC: 219 U/L — SIGNIFICANT CHANGE UP (ref 50–242)
MAGNESIUM SERPL-MCNC: 1.7 MG/DL — SIGNIFICANT CHANGE UP (ref 1.6–2.6)
MCHC RBC-ENTMCNC: 24.3 PG — LOW (ref 27–34)
MCHC RBC-ENTMCNC: 31.5 GM/DL — LOW (ref 32–36)
MCV RBC AUTO: 77.2 FL — LOW (ref 80–100)
PHOSPHATE SERPL-MCNC: 3.8 MG/DL — SIGNIFICANT CHANGE UP (ref 2.5–4.5)
PLATELET # BLD AUTO: 464 K/UL — HIGH (ref 150–400)
POTASSIUM SERPL-MCNC: 4 MMOL/L — SIGNIFICANT CHANGE UP (ref 3.5–5.3)
POTASSIUM SERPL-SCNC: 4 MMOL/L — SIGNIFICANT CHANGE UP (ref 3.5–5.3)
PROTHROM AB SERPL-ACNC: 15.5 SEC — HIGH (ref 10–13.1)
RBC # BLD: 4.52 M/UL — SIGNIFICANT CHANGE UP (ref 4.2–5.8)
RBC # FLD: 18.5 % — HIGH (ref 10.3–14.5)
SODIUM SERPL-SCNC: 139 MMOL/L — SIGNIFICANT CHANGE UP (ref 135–145)
SPECIMEN SOURCE: SIGNIFICANT CHANGE UP
SPECIMEN SOURCE: SIGNIFICANT CHANGE UP
URATE SERPL-MCNC: 2.5 MG/DL — LOW (ref 3.4–8.8)
WBC # BLD: 9.07 K/UL — SIGNIFICANT CHANGE UP (ref 3.8–10.5)
WBC # FLD AUTO: 9.07 K/UL — SIGNIFICANT CHANGE UP (ref 3.8–10.5)

## 2017-02-03 PROCEDURE — 32555 ASPIRATE PLEURA W/ IMAGING: CPT | Mod: RT

## 2017-02-03 PROCEDURE — 88189 FLOWCYTOMETRY/READ 16 & >: CPT

## 2017-02-03 PROCEDURE — 99255 IP/OBS CONSLTJ NEW/EST HI 80: CPT

## 2017-02-03 PROCEDURE — 93306 TTE W/DOPPLER COMPLETE: CPT | Mod: 26

## 2017-02-03 PROCEDURE — 71010: CPT | Mod: 26

## 2017-02-03 RX ORDER — IBUPROFEN 200 MG
600 TABLET ORAL ONCE
Qty: 0 | Refills: 0 | Status: COMPLETED | OUTPATIENT
Start: 2017-02-03 | End: 2017-02-03

## 2017-02-03 RX ORDER — ACETAMINOPHEN 500 MG
650 TABLET ORAL ONCE
Qty: 0 | Refills: 0 | Status: COMPLETED | OUTPATIENT
Start: 2017-02-03 | End: 2017-02-03

## 2017-02-03 RX ADMIN — Medication 300 MILLIGRAM(S): at 12:23

## 2017-02-03 RX ADMIN — Medication 600 MILLIGRAM(S): at 05:26

## 2017-02-03 RX ADMIN — Medication 650 MILLIGRAM(S): at 11:00

## 2017-02-03 RX ADMIN — Medication 600 MILLIGRAM(S): at 05:56

## 2017-02-03 RX ADMIN — Medication 650 MILLIGRAM(S): at 21:15

## 2017-02-03 RX ADMIN — ENOXAPARIN SODIUM 40 MILLIGRAM(S): 100 INJECTION SUBCUTANEOUS at 05:27

## 2017-02-03 RX ADMIN — Medication 650 MILLIGRAM(S): at 20:08

## 2017-02-03 RX ADMIN — Medication 650 MILLIGRAM(S): at 10:35

## 2017-02-04 LAB
ANION GAP SERPL CALC-SCNC: 15 MMOL/L — SIGNIFICANT CHANGE UP (ref 5–17)
BUN SERPL-MCNC: 15 MG/DL — SIGNIFICANT CHANGE UP (ref 7–23)
CALCIUM SERPL-MCNC: 8.5 MG/DL — SIGNIFICANT CHANGE UP (ref 8.4–10.5)
CHLORIDE SERPL-SCNC: 103 MMOL/L — SIGNIFICANT CHANGE UP (ref 96–108)
CO2 SERPL-SCNC: 22 MMOL/L — SIGNIFICANT CHANGE UP (ref 22–31)
CREAT SERPL-MCNC: 0.8 MG/DL — SIGNIFICANT CHANGE UP (ref 0.5–1.3)
ERYTHROCYTE [SEDIMENTATION RATE] IN BLOOD: 64 MM/HR — HIGH (ref 0–15)
GLUCOSE SERPL-MCNC: 87 MG/DL — SIGNIFICANT CHANGE UP (ref 70–99)
HCT VFR BLD CALC: 38.6 % — LOW (ref 39–50)
HGB BLD-MCNC: 11.8 G/DL — LOW (ref 13–17)
MCHC RBC-ENTMCNC: 23.7 PG — LOW (ref 27–34)
MCHC RBC-ENTMCNC: 30.6 GM/DL — LOW (ref 32–36)
MCV RBC AUTO: 77.7 FL — LOW (ref 80–100)
PLATELET # BLD AUTO: 594 K/UL — HIGH (ref 150–400)
POTASSIUM SERPL-MCNC: 4.3 MMOL/L — SIGNIFICANT CHANGE UP (ref 3.5–5.3)
POTASSIUM SERPL-SCNC: 4.3 MMOL/L — SIGNIFICANT CHANGE UP (ref 3.5–5.3)
RBC # BLD: 4.97 M/UL — SIGNIFICANT CHANGE UP (ref 4.2–5.8)
RBC # FLD: 18.5 % — HIGH (ref 10.3–14.5)
SODIUM SERPL-SCNC: 140 MMOL/L — SIGNIFICANT CHANGE UP (ref 135–145)
WBC # BLD: 12.33 K/UL — HIGH (ref 3.8–10.5)
WBC # FLD AUTO: 12.33 K/UL — HIGH (ref 3.8–10.5)

## 2017-02-04 PROCEDURE — 99232 SBSQ HOSP IP/OBS MODERATE 35: CPT | Mod: GC

## 2017-02-04 PROCEDURE — 99233 SBSQ HOSP IP/OBS HIGH 50: CPT

## 2017-02-04 RX ORDER — METOCLOPRAMIDE HCL 10 MG
5 TABLET ORAL EVERY 6 HOURS
Qty: 0 | Refills: 0 | Status: DISCONTINUED | OUTPATIENT
Start: 2017-02-04 | End: 2017-02-04

## 2017-02-04 RX ORDER — DEXAMETHASONE 0.5 MG/5ML
12 ELIXIR ORAL ONCE
Qty: 0 | Refills: 0 | Status: DISCONTINUED | OUTPATIENT
Start: 2017-02-04 | End: 2017-02-04

## 2017-02-04 RX ORDER — FUROSEMIDE 40 MG
40 TABLET ORAL ONCE
Qty: 0 | Refills: 0 | Status: COMPLETED | OUTPATIENT
Start: 2017-02-04 | End: 2017-02-04

## 2017-02-04 RX ORDER — ONDANSETRON 8 MG/1
16 TABLET, FILM COATED ORAL DAILY
Qty: 0 | Refills: 0 | Status: DISCONTINUED | OUTPATIENT
Start: 2017-02-04 | End: 2017-02-04

## 2017-02-04 RX ORDER — DACARBAZINE 10 MG/ML
825 INJECTION, POWDER, FOR SOLUTION INTRAVENOUS ONCE
Qty: 0 | Refills: 0 | Status: DISCONTINUED | OUTPATIENT
Start: 2017-02-04 | End: 2017-02-04

## 2017-02-04 RX ORDER — VINBLASTINE SULFATE 10 MG
13 VIAL (EA) INTRAVENOUS ONCE
Qty: 0 | Refills: 0 | Status: DISCONTINUED | OUTPATIENT
Start: 2017-02-04 | End: 2017-02-04

## 2017-02-04 RX ORDER — ACETAMINOPHEN 500 MG
650 TABLET ORAL ONCE
Qty: 0 | Refills: 0 | Status: COMPLETED | OUTPATIENT
Start: 2017-02-04 | End: 2017-02-04

## 2017-02-04 RX ORDER — FOSAPREPITANT DIMEGLUMINE 150 MG/5ML
150 INJECTION, POWDER, LYOPHILIZED, FOR SOLUTION INTRAVENOUS ONCE
Qty: 0 | Refills: 0 | Status: DISCONTINUED | OUTPATIENT
Start: 2017-02-04 | End: 2017-02-04

## 2017-02-04 RX ORDER — DOXORUBICIN HYDROCHLORIDE 2 MG/ML
55 INJECTION, SOLUTION INTRAVENOUS ONCE
Qty: 0 | Refills: 0 | Status: DISCONTINUED | OUTPATIENT
Start: 2017-02-04 | End: 2017-02-04

## 2017-02-04 RX ADMIN — Medication 650 MILLIGRAM(S): at 16:19

## 2017-02-04 RX ADMIN — Medication 650 MILLIGRAM(S): at 04:20

## 2017-02-04 RX ADMIN — Medication 300 MILLIGRAM(S): at 12:35

## 2017-02-04 RX ADMIN — Medication 650 MILLIGRAM(S): at 16:45

## 2017-02-04 RX ADMIN — Medication 650 MILLIGRAM(S): at 03:20

## 2017-02-04 RX ADMIN — Medication 650 MILLIGRAM(S): at 23:20

## 2017-02-04 RX ADMIN — Medication 650 MILLIGRAM(S): at 23:57

## 2017-02-04 RX ADMIN — ENOXAPARIN SODIUM 40 MILLIGRAM(S): 100 INJECTION SUBCUTANEOUS at 06:31

## 2017-02-04 RX ADMIN — Medication 650 MILLIGRAM(S): at 08:47

## 2017-02-04 RX ADMIN — Medication 40 MILLIGRAM(S): at 08:53

## 2017-02-04 RX ADMIN — Medication 650 MILLIGRAM(S): at 09:20

## 2017-02-04 RX ADMIN — Medication 650 MILLIGRAM(S): at 22:20

## 2017-02-05 LAB
ANION GAP SERPL CALC-SCNC: 14 MMOL/L — SIGNIFICANT CHANGE UP (ref 5–17)
BUN SERPL-MCNC: 18 MG/DL — SIGNIFICANT CHANGE UP (ref 7–23)
CALCIUM SERPL-MCNC: 8.3 MG/DL — LOW (ref 8.4–10.5)
CHLORIDE SERPL-SCNC: 101 MMOL/L — SIGNIFICANT CHANGE UP (ref 96–108)
CO2 SERPL-SCNC: 21 MMOL/L — LOW (ref 22–31)
CREAT SERPL-MCNC: 0.78 MG/DL — SIGNIFICANT CHANGE UP (ref 0.5–1.3)
CULTURE RESULTS: SIGNIFICANT CHANGE UP
CULTURE RESULTS: SIGNIFICANT CHANGE UP
GLUCOSE SERPL-MCNC: 135 MG/DL — HIGH (ref 70–99)
HCT VFR BLD CALC: 37.9 % — LOW (ref 39–50)
HGB BLD-MCNC: 11.6 G/DL — LOW (ref 13–17)
MCHC RBC-ENTMCNC: 23.9 PG — LOW (ref 27–34)
MCHC RBC-ENTMCNC: 30.6 GM/DL — LOW (ref 32–36)
MCV RBC AUTO: 78.1 FL — LOW (ref 80–100)
PLATELET # BLD AUTO: 530 K/UL — HIGH (ref 150–400)
POTASSIUM SERPL-MCNC: 4.2 MMOL/L — SIGNIFICANT CHANGE UP (ref 3.5–5.3)
POTASSIUM SERPL-SCNC: 4.2 MMOL/L — SIGNIFICANT CHANGE UP (ref 3.5–5.3)
RBC # BLD: 4.85 M/UL — SIGNIFICANT CHANGE UP (ref 4.2–5.8)
RBC # FLD: 19 % — HIGH (ref 10.3–14.5)
SODIUM SERPL-SCNC: 136 MMOL/L — SIGNIFICANT CHANGE UP (ref 135–145)
SPECIMEN SOURCE: SIGNIFICANT CHANGE UP
SPECIMEN SOURCE: SIGNIFICANT CHANGE UP
WBC # BLD: 15.71 K/UL — HIGH (ref 3.8–10.5)
WBC # FLD AUTO: 15.71 K/UL — HIGH (ref 3.8–10.5)

## 2017-02-05 PROCEDURE — 99233 SBSQ HOSP IP/OBS HIGH 50: CPT

## 2017-02-05 PROCEDURE — 99233 SBSQ HOSP IP/OBS HIGH 50: CPT | Mod: GC

## 2017-02-05 PROCEDURE — 71010: CPT | Mod: 26

## 2017-02-05 RX ORDER — FUROSEMIDE 40 MG
40 TABLET ORAL ONCE
Qty: 0 | Refills: 0 | Status: DISCONTINUED | OUTPATIENT
Start: 2017-02-05 | End: 2017-02-06

## 2017-02-05 RX ORDER — VINBLASTINE SULFATE 10 MG
13 VIAL (EA) INTRAVENOUS ONCE
Qty: 0 | Refills: 0 | Status: COMPLETED | OUTPATIENT
Start: 2017-02-05 | End: 2017-02-05

## 2017-02-05 RX ORDER — METOCLOPRAMIDE HCL 10 MG
5 TABLET ORAL EVERY 6 HOURS
Qty: 0 | Refills: 0 | Status: DISCONTINUED | OUTPATIENT
Start: 2017-02-05 | End: 2017-02-10

## 2017-02-05 RX ORDER — ONDANSETRON 8 MG/1
16 TABLET, FILM COATED ORAL DAILY
Qty: 0 | Refills: 0 | Status: COMPLETED | OUTPATIENT
Start: 2017-02-05 | End: 2017-02-05

## 2017-02-05 RX ORDER — FOSAPREPITANT DIMEGLUMINE 150 MG/5ML
150 INJECTION, POWDER, LYOPHILIZED, FOR SOLUTION INTRAVENOUS ONCE
Qty: 0 | Refills: 0 | Status: COMPLETED | OUTPATIENT
Start: 2017-02-05 | End: 2017-02-05

## 2017-02-05 RX ORDER — FUROSEMIDE 40 MG
40 TABLET ORAL ONCE
Qty: 0 | Refills: 0 | Status: COMPLETED | OUTPATIENT
Start: 2017-02-05 | End: 2017-02-05

## 2017-02-05 RX ORDER — DOXORUBICIN HYDROCHLORIDE 2 MG/ML
55 INJECTION, SOLUTION INTRAVENOUS ONCE
Qty: 0 | Refills: 0 | Status: COMPLETED | OUTPATIENT
Start: 2017-02-05 | End: 2017-02-05

## 2017-02-05 RX ORDER — DEXAMETHASONE 0.5 MG/5ML
12 ELIXIR ORAL ONCE
Qty: 0 | Refills: 0 | Status: COMPLETED | OUTPATIENT
Start: 2017-02-05 | End: 2017-02-05

## 2017-02-05 RX ORDER — DACARBAZINE 10 MG/ML
825 INJECTION, POWDER, FOR SOLUTION INTRAVENOUS ONCE
Qty: 0 | Refills: 0 | Status: COMPLETED | OUTPATIENT
Start: 2017-02-05 | End: 2017-02-05

## 2017-02-05 RX ADMIN — FOSAPREPITANT DIMEGLUMINE 450 MILLIGRAM(S): 150 INJECTION, POWDER, LYOPHILIZED, FOR SOLUTION INTRAVENOUS at 14:35

## 2017-02-05 RX ADMIN — ONDANSETRON 116 MILLIGRAM(S): 8 TABLET, FILM COATED ORAL at 14:55

## 2017-02-05 RX ADMIN — Medication 300 MILLIGRAM(S): at 11:34

## 2017-02-05 RX ADMIN — Medication 40 MILLIGRAM(S): at 11:34

## 2017-02-05 RX ADMIN — Medication 106 MILLIGRAM(S): at 14:15

## 2017-02-05 RX ADMIN — ENOXAPARIN SODIUM 40 MILLIGRAM(S): 100 INJECTION SUBCUTANEOUS at 06:23

## 2017-02-05 NOTE — ADVANCED PRACTICE NURSE CONSULT - ASSESSMENT
Cycle 1, day 1/1,Height and weight verified. Lab results as per Md dez aware of same. Vital signs stable prior to chemotherapy and  within acceptable parameters, see sunrise. Pt and spouse educated on the importance of saving urine, verbalizes good understanding. Pt  and spouse education done re chemo regimen, drug effects and potential side effects, written materials provided, pt states understanding.  Pt with left arm peripheral  line, site free from signs and symptoms of infection, good blood return obtained. Pre- medicated with decadron 12mg ivss, Zofran 16mg ivss and emend 150mg ivss. Second IV line started to right arm, Vinblastine Velban 6mg/m2= 13mg day #1, IVP via side running line, positive blood return maintained.  This was followed by Adriamycin 25mg/m2-55mg IV day #1 over three hours at 1515, same given via locked pump, patient tolerated same well. Dacarbazine 375mg/j1=347bh IV on day #1, same given over one hour via locked pump, patient tolerated same well.

## 2017-02-05 NOTE — PROVIDER CONTACT NOTE (OTHER) - ASSESSMENT
T 101.7F
T=101.6 rectal
Pt in no acute distress denies SOB/ Chest Pain. "Feels swollen" noticed increased generalized edema to abdomen/ LE (Ankles)
Temp 100.9
Temp 100.9
VSS.  Patient a&o x4
pt v/s stable pt cough.

## 2017-02-05 NOTE — PROVIDER CONTACT NOTE (OTHER) - BACKGROUND
Patient s/p thoracentesis , history of lymphoma
Patient s/p thoracentesis , history of lymphoma.
Patient s/p thorocentesis, pending PICC line for chemo therapy.
Pt admitted with SOB Fevers
pt admitted on 1/26  with newly diagnose lymphoma
pt has had fever previously 100.7F at 2036 1/29 Tylenol 650mg given. time not due.

## 2017-02-05 NOTE — PROVIDER CONTACT NOTE (OTHER) - ACTION/TREATMENT ORDERED:
NP made aware. Will continue to monitor
Change drug dosing wt
Blood cultures and Tylenol PO.
Motrin 600mg po stat continue to monitor pt
Motrin 600mg stat order
NP prefers to have patient have PICC line placed tonight 2/3/15, if patient refuses placement, then PICC line can be placed 2/4/17.
reassess patient in morning for blood culture draw with AM labs

## 2017-02-05 NOTE — PROVIDER CONTACT NOTE (OTHER) - RECOMMENDATIONS
Explain to patient importance of blood cultures, reassess patient in morning for blood culture draw with AM labs

## 2017-02-06 LAB
ALBUMIN FLD-MCNC: 1.1 G/DL — SIGNIFICANT CHANGE UP
ALBUMIN SERPL ELPH-MCNC: 1.6 G/DL — LOW (ref 3.3–5)
ALP SERPL-CCNC: 252 U/L — HIGH (ref 40–120)
ALT FLD-CCNC: 40 U/L RC — SIGNIFICANT CHANGE UP (ref 10–45)
ANION GAP SERPL CALC-SCNC: 10 MMOL/L — SIGNIFICANT CHANGE UP (ref 5–17)
ANION GAP SERPL CALC-SCNC: 8 MMOL/L — SIGNIFICANT CHANGE UP (ref 5–17)
AST SERPL-CCNC: 32 U/L — SIGNIFICANT CHANGE UP (ref 10–40)
B PERT IGG+IGM PNL SER: ABNORMAL
BILIRUB SERPL-MCNC: 0.2 MG/DL — SIGNIFICANT CHANGE UP (ref 0.2–1.2)
BUN SERPL-MCNC: 17 MG/DL — SIGNIFICANT CHANGE UP (ref 7–23)
BUN SERPL-MCNC: 17 MG/DL — SIGNIFICANT CHANGE UP (ref 7–23)
CALCIUM SERPL-MCNC: 8.2 MG/DL — LOW (ref 8.4–10.5)
CALCIUM SERPL-MCNC: 8.6 MG/DL — SIGNIFICANT CHANGE UP (ref 8.4–10.5)
CHLORIDE SERPL-SCNC: 104 MMOL/L — SIGNIFICANT CHANGE UP (ref 96–108)
CHLORIDE SERPL-SCNC: 104 MMOL/L — SIGNIFICANT CHANGE UP (ref 96–108)
CO2 SERPL-SCNC: 24 MMOL/L — SIGNIFICANT CHANGE UP (ref 22–31)
CO2 SERPL-SCNC: 27 MMOL/L — SIGNIFICANT CHANGE UP (ref 22–31)
COLOR FLD: YELLOW — SIGNIFICANT CHANGE UP
CREAT SERPL-MCNC: 0.61 MG/DL — SIGNIFICANT CHANGE UP (ref 0.5–1.3)
CREAT SERPL-MCNC: 0.63 MG/DL — SIGNIFICANT CHANGE UP (ref 0.5–1.3)
FLUID INTAKE SUBSTANCE CLASS: SIGNIFICANT CHANGE UP
FLUID SEGMENTED GRANULOCYTES: 74 % — SIGNIFICANT CHANGE UP
GLUCOSE FLD-MCNC: 135 — SIGNIFICANT CHANGE UP
GLUCOSE SERPL-MCNC: 125 MG/DL — HIGH (ref 70–99)
GLUCOSE SERPL-MCNC: 131 MG/DL — HIGH (ref 70–99)
GRAM STN FLD: SIGNIFICANT CHANGE UP
HCT VFR BLD CALC: 36.7 % — LOW (ref 39–50)
HGB BLD-MCNC: 11.6 G/DL — LOW (ref 13–17)
LDH SERPL L TO P-CCNC: 102 U/L — SIGNIFICANT CHANGE UP
LYMPHOCYTES # FLD: 14 % — SIGNIFICANT CHANGE UP
MAGNESIUM SERPL-MCNC: 2.2 MG/DL — SIGNIFICANT CHANGE UP (ref 1.6–2.6)
MCHC RBC-ENTMCNC: 24.8 PG — LOW (ref 27–34)
MCHC RBC-ENTMCNC: 31.5 GM/DL — LOW (ref 32–36)
MCV RBC AUTO: 78.7 FL — LOW (ref 80–100)
MESOTHL CELL # FLD: 5 % — SIGNIFICANT CHANGE UP
MONOS+MACROS # FLD: 7 % — SIGNIFICANT CHANGE UP
PH FLD: 7.58 — SIGNIFICANT CHANGE UP
PHOSPHATE SERPL-MCNC: 4 MG/DL — SIGNIFICANT CHANGE UP (ref 2.5–4.5)
PLATELET # BLD AUTO: 532 K/UL — HIGH (ref 150–400)
POTASSIUM SERPL-MCNC: 4.2 MMOL/L — SIGNIFICANT CHANGE UP (ref 3.5–5.3)
POTASSIUM SERPL-MCNC: 4.6 MMOL/L — SIGNIFICANT CHANGE UP (ref 3.5–5.3)
POTASSIUM SERPL-SCNC: 4.2 MMOL/L — SIGNIFICANT CHANGE UP (ref 3.5–5.3)
POTASSIUM SERPL-SCNC: 4.6 MMOL/L — SIGNIFICANT CHANGE UP (ref 3.5–5.3)
PROT FLD-MCNC: 2.9 G/DL — SIGNIFICANT CHANGE UP
PROT SERPL-MCNC: 6.5 G/DL — SIGNIFICANT CHANGE UP (ref 6–8.3)
RBC # BLD: 4.66 M/UL — SIGNIFICANT CHANGE UP (ref 4.2–5.8)
RBC # FLD: 17.5 % — HIGH (ref 10.3–14.5)
RCV VOL RI: 105 /UL — HIGH (ref 0–5)
SODIUM SERPL-SCNC: 138 MMOL/L — SIGNIFICANT CHANGE UP (ref 135–145)
SODIUM SERPL-SCNC: 139 MMOL/L — SIGNIFICANT CHANGE UP (ref 135–145)
SPECIMEN SOURCE: SIGNIFICANT CHANGE UP
TOTAL NUCLEATED CELL COUNT, BODY FLUID: 840 /UL — HIGH (ref 0–5)
TUBE TYPE: SIGNIFICANT CHANGE UP
URATE SERPL-MCNC: 3.3 MG/DL — LOW (ref 3.4–8.8)
WBC # BLD: 12.3 K/UL — HIGH (ref 3.8–10.5)
WBC # FLD AUTO: 12.3 K/UL — HIGH (ref 3.8–10.5)

## 2017-02-06 PROCEDURE — 71010: CPT | Mod: 26

## 2017-02-06 PROCEDURE — 88112 CYTOPATH CELL ENHANCE TECH: CPT | Mod: 26

## 2017-02-06 PROCEDURE — 99233 SBSQ HOSP IP/OBS HIGH 50: CPT

## 2017-02-06 PROCEDURE — 88305 TISSUE EXAM BY PATHOLOGIST: CPT | Mod: 26

## 2017-02-06 PROCEDURE — 32555 ASPIRATE PLEURA W/ IMAGING: CPT | Mod: LT

## 2017-02-06 RX ORDER — FUROSEMIDE 40 MG
20 TABLET ORAL DAILY
Qty: 0 | Refills: 0 | Status: DISCONTINUED | OUTPATIENT
Start: 2017-02-06 | End: 2017-02-10

## 2017-02-06 RX ADMIN — Medication 300 MILLIGRAM(S): at 11:19

## 2017-02-06 RX ADMIN — Medication 650 MILLIGRAM(S): at 17:39

## 2017-02-06 RX ADMIN — Medication 650 MILLIGRAM(S): at 19:15

## 2017-02-06 RX ADMIN — ENOXAPARIN SODIUM 40 MILLIGRAM(S): 100 INJECTION SUBCUTANEOUS at 05:56

## 2017-02-07 LAB
-  AMPICILLIN: SIGNIFICANT CHANGE UP
-  CIPROFLOXACIN: SIGNIFICANT CHANGE UP
-  NITROFURANTOIN: SIGNIFICANT CHANGE UP
-  TETRACYCLINE: SIGNIFICANT CHANGE UP
-  VANCOMYCIN: SIGNIFICANT CHANGE UP
CULTURE RESULTS: SIGNIFICANT CHANGE UP
HEMATOPATHOLOGY REPORT: SIGNIFICANT CHANGE UP
METHOD TYPE: SIGNIFICANT CHANGE UP
NIGHT BLUE STAIN TISS: SIGNIFICANT CHANGE UP
ORGANISM # SPEC MICROSCOPIC CNT: SIGNIFICANT CHANGE UP
ORGANISM # SPEC MICROSCOPIC CNT: SIGNIFICANT CHANGE UP
SPECIMEN SOURCE: SIGNIFICANT CHANGE UP
SPECIMEN SOURCE: SIGNIFICANT CHANGE UP
TM INTERPRETATION: SIGNIFICANT CHANGE UP

## 2017-02-07 PROCEDURE — 76937 US GUIDE VASCULAR ACCESS: CPT | Mod: 26

## 2017-02-07 PROCEDURE — 36561 INSERT TUNNELED CV CATH: CPT

## 2017-02-07 PROCEDURE — 99232 SBSQ HOSP IP/OBS MODERATE 35: CPT

## 2017-02-07 PROCEDURE — 77001 FLUOROGUIDE FOR VEIN DEVICE: CPT | Mod: 26

## 2017-02-07 RX ADMIN — Medication 20 MILLIGRAM(S): at 05:00

## 2017-02-07 RX ADMIN — Medication 300 MILLIGRAM(S): at 18:32

## 2017-02-08 LAB
ANION GAP SERPL CALC-SCNC: 12 MMOL/L — SIGNIFICANT CHANGE UP (ref 5–17)
ANION GAP SERPL CALC-SCNC: 16 MMOL/L — SIGNIFICANT CHANGE UP (ref 5–17)
APTT BLD: 29 SEC — SIGNIFICANT CHANGE UP (ref 27.5–37.4)
BUN SERPL-MCNC: 19 MG/DL — SIGNIFICANT CHANGE UP (ref 7–23)
BUN SERPL-MCNC: 21 MG/DL — SIGNIFICANT CHANGE UP (ref 7–23)
CALCIUM SERPL-MCNC: 8 MG/DL — LOW (ref 8.4–10.5)
CALCIUM SERPL-MCNC: 8.1 MG/DL — LOW (ref 8.4–10.5)
CHLORIDE SERPL-SCNC: 103 MMOL/L — SIGNIFICANT CHANGE UP (ref 96–108)
CHLORIDE SERPL-SCNC: 105 MMOL/L — SIGNIFICANT CHANGE UP (ref 96–108)
CO2 SERPL-SCNC: 22 MMOL/L — SIGNIFICANT CHANGE UP (ref 22–31)
CO2 SERPL-SCNC: 23 MMOL/L — SIGNIFICANT CHANGE UP (ref 22–31)
CREAT SERPL-MCNC: 0.6 MG/DL — SIGNIFICANT CHANGE UP (ref 0.5–1.3)
CREAT SERPL-MCNC: 0.62 MG/DL — SIGNIFICANT CHANGE UP (ref 0.5–1.3)
CULTURE RESULTS: SIGNIFICANT CHANGE UP
GLUCOSE SERPL-MCNC: 71 MG/DL — SIGNIFICANT CHANGE UP (ref 70–99)
GLUCOSE SERPL-MCNC: 76 MG/DL — SIGNIFICANT CHANGE UP (ref 70–99)
HCT VFR BLD CALC: 33.2 % — LOW (ref 39–50)
HCT VFR BLD CALC: 33.7 % — LOW (ref 39–50)
HGB BLD-MCNC: 10.1 G/DL — LOW (ref 13–17)
HGB BLD-MCNC: 10.3 G/DL — LOW (ref 13–17)
INR BLD: 1.23 RATIO — HIGH (ref 0.88–1.16)
MCHC RBC-ENTMCNC: 23.8 PG — LOW (ref 27–34)
MCHC RBC-ENTMCNC: 24.1 PG — LOW (ref 27–34)
MCHC RBC-ENTMCNC: 30.4 GM/DL — LOW (ref 32–36)
MCHC RBC-ENTMCNC: 30.6 GM/DL — LOW (ref 32–36)
MCV RBC AUTO: 78.3 FL — LOW (ref 80–100)
MCV RBC AUTO: 78.7 FL — LOW (ref 80–100)
NON-GYNECOLOGICAL CYTOLOGY STUDY: SIGNIFICANT CHANGE UP
PLATELET # BLD AUTO: 454 K/UL — HIGH (ref 150–400)
PLATELET # BLD AUTO: 511 K/UL — HIGH (ref 150–400)
POTASSIUM SERPL-MCNC: 3.6 MMOL/L — SIGNIFICANT CHANGE UP (ref 3.5–5.3)
POTASSIUM SERPL-MCNC: 4.2 MMOL/L — SIGNIFICANT CHANGE UP (ref 3.5–5.3)
POTASSIUM SERPL-SCNC: 3.6 MMOL/L — SIGNIFICANT CHANGE UP (ref 3.5–5.3)
POTASSIUM SERPL-SCNC: 4.2 MMOL/L — SIGNIFICANT CHANGE UP (ref 3.5–5.3)
PROTHROM AB SERPL-ACNC: 13.9 SEC — HIGH (ref 10–13.1)
RBC # BLD: 4.24 M/UL — SIGNIFICANT CHANGE UP (ref 4.2–5.8)
RBC # BLD: 4.28 M/UL — SIGNIFICANT CHANGE UP (ref 4.2–5.8)
RBC # FLD: 19 % — HIGH (ref 10.3–14.5)
RBC # FLD: 19.1 % — HIGH (ref 10.3–14.5)
SODIUM SERPL-SCNC: 140 MMOL/L — SIGNIFICANT CHANGE UP (ref 135–145)
SODIUM SERPL-SCNC: 141 MMOL/L — SIGNIFICANT CHANGE UP (ref 135–145)
WBC # BLD: 7.97 K/UL — SIGNIFICANT CHANGE UP (ref 3.8–10.5)
WBC # BLD: 8.98 K/UL — SIGNIFICANT CHANGE UP (ref 3.8–10.5)
WBC # FLD AUTO: 7.97 K/UL — SIGNIFICANT CHANGE UP (ref 3.8–10.5)
WBC # FLD AUTO: 8.98 K/UL — SIGNIFICANT CHANGE UP (ref 3.8–10.5)

## 2017-02-08 PROCEDURE — 93971 EXTREMITY STUDY: CPT | Mod: 26

## 2017-02-08 PROCEDURE — 99232 SBSQ HOSP IP/OBS MODERATE 35: CPT

## 2017-02-08 PROCEDURE — 99231 SBSQ HOSP IP/OBS SF/LOW 25: CPT

## 2017-02-08 RX ORDER — FUROSEMIDE 40 MG
1 TABLET ORAL
Qty: 7 | Refills: 0 | OUTPATIENT
Start: 2017-02-08 | End: 2017-02-22

## 2017-02-08 RX ADMIN — Medication 300 MILLIGRAM(S): at 12:13

## 2017-02-08 RX ADMIN — ENOXAPARIN SODIUM 40 MILLIGRAM(S): 100 INJECTION SUBCUTANEOUS at 06:39

## 2017-02-08 RX ADMIN — Medication 20 MILLIGRAM(S): at 06:38

## 2017-02-08 RX ADMIN — Medication 650 MILLIGRAM(S): at 07:09

## 2017-02-08 RX ADMIN — Medication 650 MILLIGRAM(S): at 06:39

## 2017-02-08 NOTE — DISCHARGE NOTE ADULT - PATIENT PORTAL LINK FT
“You can access the Follow[facility] Patient Portal, offered by [facility], by registering with the following website: [email or web address]”

## 2017-02-08 NOTE — DISCHARGE NOTE ADULT - CARE PROVIDER_API CALL
Moe Leggett), Internal Medicine  [address] [place] [address]  Phone: [phone]  Fax: [phone] Moe Leggett), Internal Medicine  [address] [place] [address]  Phone: [phone]  Fax: [phone]    Meir Coleman), Cardiovascular Disease  [address] [place] [address]  Phone: [phone]  Fax: [phone]

## 2017-02-08 NOTE — DISCHARGE NOTE ADULT - MEDICATION SUMMARY - MEDICATIONS TO TAKE
I will START or STAY ON the medications listed below when I get home from the hospital:    allopurinol 300 mg oral tablet  -- 1 tab(s) by mouth once a day  -- Indication: For Anti-gout    furosemide 20 mg oral tablet  -- 1 tab(s) by mouth every other day  -- Indication: For Diuretic

## 2017-02-08 NOTE — DISCHARGE NOTE ADULT - HOSPITAL COURSE
To be completed by Attending. 36M w/ suspected diagnosis of lymphoma based on CTA who presents with constitutional symptoms c/w malignancy and pulmonary symptoms that are likely 2/2 his b/l PLEFs. Although the patient meets SIRS I suspect the tachycardia, leukocytosis and fevers as outpatient are 2/2 his likely malignancy. I have a low clinical suspicion for an infectious process. The duration of his fevers (months) points away from this. His axillary and posterior cervical LAD as well as the LAD seen on CT also are c/w malignancy. I suspect his anemia, low Alb, thrombocytosis are all related to the malignancy. Do not suspect that coronavirus is contributing at this point.    Dx: R pleural effusion/lymphadenopahathy s/p thorocentesis 1/27 and axillary lymph node biopsy 1/31, Newly diagnosed Lymphoma,  Coronovirus     1/29 CXR: The cardiac silhouette is normal in size. The previously noted right lower lobe airspace opacity has resolved. There is a small left pleural effusion with associated atelectasis. An underlying left basilar pneumonia cannot be excluded.   Lymphoma.  Plan: -Given accessibility of LAD (axillary) would pursue Onc consult and tissue biopsy  -Need tissue Dx as next step, may also need further imaging to determine extent of LAD (would hold off repeat CT w/ contrast given pt just received contrast) Suspect malignancy is the etiology of the patient's fever - will hold off on ABxs for now - even if patient spike, unless clinical setting changes would defer ABxs but would send BCx, UA, UCx  -Check UA now for Prot given low Alb  -Check HIV (d/w patient), LDH, Quant Gold (pt born in ).  s/p thoracenthesis- fluid with moderate wbc's , post procedure cxr shows improvement, 1/27: Therapeutic Thoracentesis done 2.2L removed, JG supervised IA, no complications, post CXR neg for pneumoO2 sat 98%-3L O2 via n/c  1/29 WBC increased, fever spike 101.6 ON, BC sent, NPO p MN fo bx.  LE dopplers ordered to r/o 	DVT (RLE)  1/30 RVP + corona virus, 1/31: s/p Lymph node biopsy.   Chest xray: Bilateral perihilar patchy opacities representing pulmonary edema versus infection. Small left pleural effusion.  02/02- ct a/pelvis- for full staging to evaluate extent of disease and NM MUGA scan for EF for treatment.   2/2 Night PA: Prelim CT ABD/Pelvis: large B/L pleural effusions with partial collapse of the B/L lower lobes, increased on the left. Extensive abdominopelvic lymphadenopathy with possible comprssion of the IVC just proximal to the bifurcation. Small volume ascities, lymphamoutous infiltration of the left kidney - 2/3; Thoracentesis and echo to r/o increased periocardial effusion . 1.7 Liter drained during R thoracentesis .  02/04- 2/5 Pt refusing PICC, onc will do chemo through PIV , then to arrange for mediport, 2/6; Thoracentesis today(1.7L).  2/7: S/p Mediport.  2/9 RUE doppler: No duplex evidence of DVT in the left upper extremity.  Discharged to home and is to follow upwith heme/onc and cardiology in 1 week.

## 2017-02-08 NOTE — DISCHARGE NOTE ADULT - MEDICATION SUMMARY - MEDICATIONS TO STOP TAKING
I will STOP taking the medications listed below when I get home from the hospital:    Claritin-D 24 Hour 10 mg-240 mg oral tablet, extended release  -- 1 tab(s) by mouth once a day, As Needed -for congestion  -- Check with your doctor before becoming pregnant.  Obtain medical advice before taking any non-prescription drugs as some may affect the action of this medication.  Swallow whole.  Do not crush.

## 2017-02-08 NOTE — DISCHARGE NOTE ADULT - PLAN OF CARE
Patient hemodynamically stable. Follow up with  for further management of your care. Stable HOME CARE INSTRUCTIONS  Take any medicines exactly as prescribed.  Follow up with your caregiver as directed.  Monitor your exercise capacity (the amount of walking you can do before you get short of breath).  Do not smoke. Ask your caregiver for help quitting.  SEEK MEDICAL CARE IF:  Your exercise capacity seems to get worse or does not improve with time.  You do not recover from your illness.  SEEK IMMEDIATE MEDICAL CARE IF:  Shortness of breath or chest pain develops or gets worse.  You have an oral temperature above 100.6 not controlled by medicine.  You develop a new cough, especially if the mucus (phlegm) is discolored Continue medications as prescribed.  Followup with Dr. Coleman, cardiology, in 1 week. Continue iron supplements  Eat iron and protein rich foods including: meat, dark leafy green vegetables, and beans.  Limit caffeine.  Notify your doctor if you experience heartburn, constipation, diarrhea, nausea/vomiting, dizziness or are feeling extremely tired.  Seek immediate care or call 911 if you experience:  shortness of breath even at rest, you have blood in your bowel movement or vomit, if you are too dizzy to stand up

## 2017-02-08 NOTE — DISCHARGE NOTE ADULT - CARE PLAN
Principal Discharge DX:	Lymphoma  Goal:	Patient hemodynamically stable.  Instructions for follow-up, activity and diet:	Follow up with  for further management of your care.  Secondary Diagnosis:	Anemia  Instructions for follow-up, activity and diet:	Stable Principal Discharge DX:	Lymphoma  Goal:	Patient hemodynamically stable.  Instructions for follow-up, activity and diet:	Follow up with  for further management of your care.  Secondary Diagnosis:	Anemia  Instructions for follow-up, activity and diet:	Continue iron supplements  Eat iron and protein rich foods including: meat, dark leafy green vegetables, and beans.  Limit caffeine.  Notify your doctor if you experience heartburn, constipation, diarrhea, nausea/vomiting, dizziness or are feeling extremely tired.  Seek immediate care or call 911 if you experience:  shortness of breath even at rest, you have blood in your bowel movement or vomit, if you are too dizzy to stand up  Secondary Diagnosis:	Pleural effusion  Instructions for follow-up, activity and diet:	HOME CARE INSTRUCTIONS  Take any medicines exactly as prescribed.  Follow up with your caregiver as directed.  Monitor your exercise capacity (the amount of walking you can do before you get short of breath).  Do not smoke. Ask your caregiver for help quitting.  SEEK MEDICAL CARE IF:  Your exercise capacity seems to get worse or does not improve with time.  You do not recover from your illness.  SEEK IMMEDIATE MEDICAL CARE IF:  Shortness of breath or chest pain develops or gets worse.  You have an oral temperature above 100.6 not controlled by medicine.  You develop a new cough, especially if the mucus (phlegm) is discolored  Secondary Diagnosis:	Pericardial effusion  Instructions for follow-up, activity and diet:	Continue medications as prescribed.  Followup with Dr. Coleman, cardiology, in 1 week.

## 2017-02-08 NOTE — DISCHARGE NOTE ADULT - CARE PROVIDERS DIRECT ADDRESSES
,[email or web address],DirectAddress_Unknown [email or web address],DirectAddress_Unknown

## 2017-02-09 PROCEDURE — 99232 SBSQ HOSP IP/OBS MODERATE 35: CPT

## 2017-02-09 RX ORDER — BENZOCAINE AND MENTHOL 5; 1 G/100ML; G/100ML
1 LIQUID ORAL ONCE
Qty: 0 | Refills: 0 | Status: COMPLETED | OUTPATIENT
Start: 2017-02-09 | End: 2017-02-09

## 2017-02-09 RX ADMIN — Medication 20 MILLIGRAM(S): at 06:36

## 2017-02-09 RX ADMIN — Medication 300 MILLIGRAM(S): at 13:28

## 2017-02-09 RX ADMIN — ENOXAPARIN SODIUM 40 MILLIGRAM(S): 100 INJECTION SUBCUTANEOUS at 06:35

## 2017-02-09 RX ADMIN — BENZOCAINE AND MENTHOL 1 LOZENGE: 5; 1 LIQUID ORAL at 20:12

## 2017-02-10 VITALS — WEIGHT: 216.05 LBS

## 2017-02-10 PROCEDURE — 88237 TISSUE CULTURE BONE MARROW: CPT

## 2017-02-10 PROCEDURE — 86900 BLOOD TYPING SEROLOGIC ABO: CPT

## 2017-02-10 PROCEDURE — 84295 ASSAY OF SERUM SODIUM: CPT

## 2017-02-10 PROCEDURE — 84484 ASSAY OF TROPONIN QUANT: CPT

## 2017-02-10 PROCEDURE — 87486 CHLMYD PNEUM DNA AMP PROBE: CPT

## 2017-02-10 PROCEDURE — 88307 TISSUE EXAM BY PATHOLOGIST: CPT

## 2017-02-10 PROCEDURE — 83986 ASSAY PH BODY FLUID NOS: CPT

## 2017-02-10 PROCEDURE — 86901 BLOOD TYPING SEROLOGIC RH(D): CPT

## 2017-02-10 PROCEDURE — 87070 CULTURE OTHR SPECIMN AEROBIC: CPT

## 2017-02-10 PROCEDURE — 87015 SPECIMEN INFECT AGNT CONCNTJ: CPT

## 2017-02-10 PROCEDURE — 76937 US GUIDE VASCULAR ACCESS: CPT

## 2017-02-10 PROCEDURE — 87086 URINE CULTURE/COLONY COUNT: CPT

## 2017-02-10 PROCEDURE — 83605 ASSAY OF LACTIC ACID: CPT

## 2017-02-10 PROCEDURE — 88185 FLOWCYTOMETRY/TC ADD-ON: CPT

## 2017-02-10 PROCEDURE — 93306 TTE W/DOPPLER COMPLETE: CPT

## 2017-02-10 PROCEDURE — 87040 BLOOD CULTURE FOR BACTERIA: CPT

## 2017-02-10 PROCEDURE — 84311 SPECTROPHOTOMETRY: CPT

## 2017-02-10 PROCEDURE — 71275 CT ANGIOGRAPHY CHEST: CPT

## 2017-02-10 PROCEDURE — 87798 DETECT AGENT NOS DNA AMP: CPT

## 2017-02-10 PROCEDURE — C1788: CPT

## 2017-02-10 PROCEDURE — A9560: CPT

## 2017-02-10 PROCEDURE — 88342 IMHCHEM/IMCYTCHM 1ST ANTB: CPT

## 2017-02-10 PROCEDURE — 83550 IRON BINDING TEST: CPT

## 2017-02-10 PROCEDURE — 84132 ASSAY OF SERUM POTASSIUM: CPT

## 2017-02-10 PROCEDURE — 93005 ELECTROCARDIOGRAM TRACING: CPT

## 2017-02-10 PROCEDURE — 87581 M.PNEUMON DNA AMP PROBE: CPT

## 2017-02-10 PROCEDURE — 86403 PARTICLE AGGLUT ANTBDY SCRN: CPT

## 2017-02-10 PROCEDURE — 32555 ASPIRATE PLEURA W/ IMAGING: CPT

## 2017-02-10 PROCEDURE — 88333 PATH CONSLTJ SURG CYTO XM 1: CPT

## 2017-02-10 PROCEDURE — 99232 SBSQ HOSP IP/OBS MODERATE 35: CPT

## 2017-02-10 PROCEDURE — 88341 IMHCHEM/IMCYTCHM EA ADD ANTB: CPT

## 2017-02-10 PROCEDURE — C1769: CPT

## 2017-02-10 PROCEDURE — 86663 EPSTEIN-BARR ANTIBODY: CPT

## 2017-02-10 PROCEDURE — 81003 URINALYSIS AUTO W/O SCOPE: CPT

## 2017-02-10 PROCEDURE — 82947 ASSAY GLUCOSE BLOOD QUANT: CPT

## 2017-02-10 PROCEDURE — 85652 RBC SED RATE AUTOMATED: CPT

## 2017-02-10 PROCEDURE — 87389 HIV-1 AG W/HIV-1&-2 AB AG IA: CPT

## 2017-02-10 PROCEDURE — 87385 HISTOPLASMA CAPSUL AG IA: CPT

## 2017-02-10 PROCEDURE — 87186 SC STD MICRODIL/AGAR DIL: CPT

## 2017-02-10 PROCEDURE — 88365 INSITU HYBRIDIZATION (FISH): CPT

## 2017-02-10 PROCEDURE — 86480 TB TEST CELL IMMUN MEASURE: CPT

## 2017-02-10 PROCEDURE — 87206 SMEAR FLUORESCENT/ACID STAI: CPT

## 2017-02-10 PROCEDURE — 89051 BODY FLUID CELL COUNT: CPT

## 2017-02-10 PROCEDURE — 88305 TISSUE EXAM BY PATHOLOGIST: CPT

## 2017-02-10 PROCEDURE — P9059: CPT

## 2017-02-10 PROCEDURE — 87633 RESP VIRUS 12-25 TARGETS: CPT

## 2017-02-10 PROCEDURE — 85610 PROTHROMBIN TIME: CPT

## 2017-02-10 PROCEDURE — 85014 HEMATOCRIT: CPT

## 2017-02-10 PROCEDURE — 82728 ASSAY OF FERRITIN: CPT

## 2017-02-10 PROCEDURE — 86698 HISTOPLASMA ANTIBODY: CPT

## 2017-02-10 PROCEDURE — 80053 COMPREHEN METABOLIC PANEL: CPT

## 2017-02-10 PROCEDURE — 82803 BLOOD GASES ANY COMBINATION: CPT

## 2017-02-10 PROCEDURE — 88360 TUMOR IMMUNOHISTOCHEM/MANUAL: CPT

## 2017-02-10 PROCEDURE — 78472 GATED HEART PLANAR SINGLE: CPT

## 2017-02-10 PROCEDURE — 88264 CHROMOSOME ANALYSIS 20-25: CPT

## 2017-02-10 PROCEDURE — 74177 CT ABD & PELVIS W/CONTRAST: CPT

## 2017-02-10 PROCEDURE — 80074 ACUTE HEPATITIS PANEL: CPT

## 2017-02-10 PROCEDURE — 71045 X-RAY EXAM CHEST 1 VIEW: CPT

## 2017-02-10 PROCEDURE — 88184 FLOWCYTOMETRY/ TC 1 MARKER: CPT

## 2017-02-10 PROCEDURE — 93971 EXTREMITY STUDY: CPT

## 2017-02-10 PROCEDURE — 80048 BASIC METABOLIC PNL TOTAL CA: CPT

## 2017-02-10 PROCEDURE — 96374 THER/PROPH/DIAG INJ IV PUSH: CPT | Mod: XU

## 2017-02-10 PROCEDURE — 86850 RBC ANTIBODY SCREEN: CPT

## 2017-02-10 PROCEDURE — 85027 COMPLETE CBC AUTOMATED: CPT

## 2017-02-10 PROCEDURE — 88112 CYTOPATH CELL ENHANCE TECH: CPT

## 2017-02-10 PROCEDURE — 82945 GLUCOSE OTHER FLUID: CPT

## 2017-02-10 PROCEDURE — 87116 MYCOBACTERIA CULTURE: CPT

## 2017-02-10 PROCEDURE — 84100 ASSAY OF PHOSPHORUS: CPT

## 2017-02-10 PROCEDURE — 83735 ASSAY OF MAGNESIUM: CPT

## 2017-02-10 PROCEDURE — 82042 OTHER SOURCE ALBUMIN QUAN EA: CPT

## 2017-02-10 PROCEDURE — 36430 TRANSFUSION BLD/BLD COMPNT: CPT

## 2017-02-10 PROCEDURE — 84550 ASSAY OF BLOOD/URIC ACID: CPT

## 2017-02-10 PROCEDURE — 84478 ASSAY OF TRIGLYCERIDES: CPT

## 2017-02-10 PROCEDURE — 86665 EPSTEIN-BARR CAPSID VCA: CPT

## 2017-02-10 PROCEDURE — 88313 SPECIAL STAINS GROUP 2: CPT

## 2017-02-10 PROCEDURE — 87102 FUNGUS ISOLATION CULTURE: CPT

## 2017-02-10 PROCEDURE — 82435 ASSAY OF BLOOD CHLORIDE: CPT

## 2017-02-10 PROCEDURE — 82977 ASSAY OF GGT: CPT

## 2017-02-10 PROCEDURE — 36561 INSERT TUNNELED CV CATH: CPT

## 2017-02-10 PROCEDURE — 93308 TTE F-UP OR LMTD: CPT

## 2017-02-10 PROCEDURE — 84157 ASSAY OF PROTEIN OTHER: CPT

## 2017-02-10 PROCEDURE — 77001 FLUOROGUIDE FOR VEIN DEVICE: CPT

## 2017-02-10 PROCEDURE — 87205 SMEAR GRAM STAIN: CPT

## 2017-02-10 PROCEDURE — 88280 CHROMOSOME KARYOTYPE STUDY: CPT

## 2017-02-10 PROCEDURE — 85730 THROMBOPLASTIN TIME PARTIAL: CPT

## 2017-02-10 PROCEDURE — 86664 EPSTEIN-BARR NUCLEAR ANTIGEN: CPT

## 2017-02-10 PROCEDURE — 87075 CULTR BACTERIA EXCEPT BLOOD: CPT

## 2017-02-10 PROCEDURE — 83615 LACTATE (LD) (LDH) ENZYME: CPT

## 2017-02-10 PROCEDURE — 85097 BONE MARROW INTERPRETATION: CPT

## 2017-02-10 PROCEDURE — 82330 ASSAY OF CALCIUM: CPT

## 2017-02-10 PROCEDURE — 99285 EMERGENCY DEPT VISIT HI MDM: CPT | Mod: 25

## 2017-02-10 PROCEDURE — C1894: CPT

## 2017-02-10 RX ADMIN — Medication 20 MILLIGRAM(S): at 05:38

## 2017-02-10 RX ADMIN — Medication 300 MILLIGRAM(S): at 11:09

## 2017-02-10 RX ADMIN — ENOXAPARIN SODIUM 40 MILLIGRAM(S): 100 INJECTION SUBCUTANEOUS at 05:38

## 2017-02-11 LAB
CULTURE RESULTS: SIGNIFICANT CHANGE UP
SPECIMEN SOURCE: SIGNIFICANT CHANGE UP

## 2017-02-12 ENCOUNTER — TRANSCRIPTION ENCOUNTER (OUTPATIENT)
Age: 37
End: 2017-02-12

## 2017-02-13 ENCOUNTER — RESULT REVIEW (OUTPATIENT)
Age: 37
End: 2017-02-13

## 2017-02-13 PROBLEM — Z00.00 ENCOUNTER FOR PREVENTIVE HEALTH EXAMINATION: Status: ACTIVE | Noted: 2017-02-13

## 2017-02-14 ENCOUNTER — OUTPATIENT (OUTPATIENT)
Dept: OUTPATIENT SERVICES | Facility: HOSPITAL | Age: 37
LOS: 1 days | Discharge: HOME | End: 2017-02-14

## 2017-02-14 ENCOUNTER — APPOINTMENT (OUTPATIENT)
Dept: HEMATOLOGY ONCOLOGY | Facility: CLINIC | Age: 37
End: 2017-02-14

## 2017-02-14 VITALS
RESPIRATION RATE: 16 BRPM | SYSTOLIC BLOOD PRESSURE: 120 MMHG | HEART RATE: 114 BPM | WEIGHT: 207.23 LBS | DIASTOLIC BLOOD PRESSURE: 80 MMHG | BODY MASS INDEX: 29.67 KG/M2 | OXYGEN SATURATION: 97 % | TEMPERATURE: 97.8 F | HEIGHT: 70.08 IN

## 2017-02-14 DIAGNOSIS — C81.90 HODGKIN LYMPHOMA, UNSPECIFIED, UNSPECIFIED SITE: ICD-10-CM

## 2017-02-14 LAB
ALBUMIN SERPL ELPH-MCNC: 2.5 G/DL
ALP BLD-CCNC: 236 U/L
ALT SERPL-CCNC: 101 U/L
ANION GAP SERPL CALC-SCNC: 13 MMOL/L
AST SERPL-CCNC: 52 U/L
BASOPHILS # BLD AUTO: 0 K/UL — SIGNIFICANT CHANGE UP (ref 0–0.2)
BASOPHILS NFR BLD AUTO: 0.1 % — SIGNIFICANT CHANGE UP (ref 0–2)
BILIRUB SERPL-MCNC: <0.2 MG/DL
BUN SERPL-MCNC: 20 MG/DL
CALCIUM SERPL-MCNC: 8.2 MG/DL
CHLORIDE SERPL-SCNC: 100 MMOL/L
CO2 SERPL-SCNC: 27 MMOL/L
CREAT SERPL-MCNC: 0.62 MG/DL
EOSINOPHIL # BLD AUTO: 0 K/UL — SIGNIFICANT CHANGE UP (ref 0–0.5)
EOSINOPHIL NFR BLD AUTO: 0.3 % — SIGNIFICANT CHANGE UP (ref 0–6)
ERYTHROCYTE [SEDIMENTATION RATE] IN BLOOD: 90 MM/HR — HIGH (ref 0–15)
GLUCOSE SERPL-MCNC: 103 MG/DL
HCT VFR BLD CALC: 38.6 % — LOW (ref 39–50)
HGB BLD-MCNC: 12.2 G/DL — LOW (ref 13–17)
LDH SERPL-CCNC: 193 U/L
LYMPHOCYTES # BLD AUTO: 0.6 K/UL — LOW (ref 1–3.3)
LYMPHOCYTES # BLD AUTO: 9.8 % — LOW (ref 13–44)
MAGNESIUM SERPL-MCNC: 2.1 MG/DL
MCHC RBC-ENTMCNC: 24.9 PG — LOW (ref 27–34)
MCHC RBC-ENTMCNC: 31.7 G/DL — LOW (ref 32–36)
MCV RBC AUTO: 78.6 FL — LOW (ref 80–100)
MONOCYTES # BLD AUTO: 0.4 K/UL — SIGNIFICANT CHANGE UP (ref 0–0.9)
MONOCYTES NFR BLD AUTO: 7.1 % — SIGNIFICANT CHANGE UP (ref 2–14)
NEUTROPHILS # BLD AUTO: 5.3 K/UL — SIGNIFICANT CHANGE UP (ref 1.8–7.4)
NEUTROPHILS NFR BLD AUTO: 82.7 % — HIGH (ref 43–77)
PHOSPHATE SERPL-MCNC: 4.3 MG/DL
PLATELET # BLD AUTO: 680 K/UL — HIGH (ref 150–400)
POTASSIUM SERPL-SCNC: 4.4 MMOL/L
PROT SERPL-MCNC: 6.4 G/DL
RBC # BLD: 4.91 M/UL — SIGNIFICANT CHANGE UP (ref 4.2–5.8)
RBC # FLD: 18 % — HIGH (ref 10.3–14.5)
SODIUM SERPL-SCNC: 140 MMOL/L
WBC # BLD: 6.4 K/UL — SIGNIFICANT CHANGE UP (ref 3.8–10.5)
WBC # FLD AUTO: 6.4 K/UL — SIGNIFICANT CHANGE UP (ref 3.8–10.5)

## 2017-02-15 LAB — CHROM ANALY OVERALL INTERP SPEC-IMP: SIGNIFICANT CHANGE UP

## 2017-02-17 LAB — CHROM ANALY OVERALL INTERP SPEC-IMP: SIGNIFICANT CHANGE UP

## 2017-02-21 ENCOUNTER — APPOINTMENT (OUTPATIENT)
Dept: RADIOLOGY | Facility: IMAGING CENTER | Age: 37
End: 2017-02-21

## 2017-02-21 ENCOUNTER — OUTPATIENT (OUTPATIENT)
Dept: OUTPATIENT SERVICES | Facility: HOSPITAL | Age: 37
LOS: 1 days | End: 2017-02-21

## 2017-02-21 ENCOUNTER — APPOINTMENT (OUTPATIENT)
Dept: CV DIAGNOSITCS | Facility: HOSPITAL | Age: 37
End: 2017-02-21

## 2017-02-21 ENCOUNTER — APPOINTMENT (OUTPATIENT)
Dept: PULMONOLOGY | Facility: CLINIC | Age: 37
End: 2017-02-21

## 2017-02-21 ENCOUNTER — OUTPATIENT (OUTPATIENT)
Dept: OUTPATIENT SERVICES | Facility: HOSPITAL | Age: 37
LOS: 1 days | End: 2017-02-21
Payer: COMMERCIAL

## 2017-02-21 DIAGNOSIS — C81.90 HODGKIN LYMPHOMA, UNSPECIFIED, UNSPECIFIED SITE: ICD-10-CM

## 2017-02-21 DIAGNOSIS — R07.9 CHEST PAIN, UNSPECIFIED: ICD-10-CM

## 2017-02-21 PROCEDURE — 71046 X-RAY EXAM CHEST 2 VIEWS: CPT

## 2017-02-23 ENCOUNTER — RESULT REVIEW (OUTPATIENT)
Age: 37
End: 2017-02-23

## 2017-02-24 ENCOUNTER — LABORATORY RESULT (OUTPATIENT)
Age: 37
End: 2017-02-24

## 2017-02-24 ENCOUNTER — APPOINTMENT (OUTPATIENT)
Dept: INFUSION THERAPY | Facility: HOSPITAL | Age: 37
End: 2017-02-24

## 2017-02-24 LAB
BASOPHILS # BLD AUTO: 0 K/UL — SIGNIFICANT CHANGE UP (ref 0–0.2)
BASOPHILS NFR BLD AUTO: 0.1 % — SIGNIFICANT CHANGE UP (ref 0–2)
EOSINOPHIL # BLD AUTO: 0.3 K/UL — SIGNIFICANT CHANGE UP (ref 0–0.5)
EOSINOPHIL NFR BLD AUTO: 2.7 % — SIGNIFICANT CHANGE UP (ref 0–6)
HCT VFR BLD CALC: 38.1 % — LOW (ref 39–50)
HGB BLD-MCNC: 12.2 G/DL — LOW (ref 13–17)
LYMPHOCYTES # BLD AUTO: 0.9 K/UL — LOW (ref 1–3.3)
LYMPHOCYTES # BLD AUTO: 7.3 % — LOW (ref 13–44)
MCHC RBC-ENTMCNC: 25.3 PG — LOW (ref 27–34)
MCHC RBC-ENTMCNC: 31.9 G/DL — LOW (ref 32–36)
MCV RBC AUTO: 79.3 FL — LOW (ref 80–100)
MONOCYTES # BLD AUTO: 0.7 K/UL — SIGNIFICANT CHANGE UP (ref 0–0.9)
MONOCYTES NFR BLD AUTO: 5.7 % — SIGNIFICANT CHANGE UP (ref 2–14)
NEUTROPHILS # BLD AUTO: 10.6 K/UL — HIGH (ref 1.8–7.4)
NEUTROPHILS NFR BLD AUTO: 84.3 % — HIGH (ref 43–77)
PLATELET # BLD AUTO: 404 K/UL — HIGH (ref 150–400)
RBC # BLD: 4.81 M/UL — SIGNIFICANT CHANGE UP (ref 4.2–5.8)
RBC # FLD: 19.3 % — HIGH (ref 10.3–14.5)
WBC # BLD: 12.6 K/UL — HIGH (ref 3.8–10.5)
WBC # FLD AUTO: 12.6 K/UL — HIGH (ref 3.8–10.5)

## 2017-02-25 LAB
CULTURE RESULTS: SIGNIFICANT CHANGE UP
SPECIMEN SOURCE: SIGNIFICANT CHANGE UP

## 2017-02-26 ENCOUNTER — RESULT REVIEW (OUTPATIENT)
Age: 37
End: 2017-02-26

## 2017-02-27 ENCOUNTER — APPOINTMENT (OUTPATIENT)
Dept: HEMATOLOGY ONCOLOGY | Facility: CLINIC | Age: 37
End: 2017-02-27

## 2017-02-27 VITALS
RESPIRATION RATE: 16 BRPM | HEART RATE: 87 BPM | OXYGEN SATURATION: 97 % | BODY MASS INDEX: 29.98 KG/M2 | DIASTOLIC BLOOD PRESSURE: 80 MMHG | TEMPERATURE: 98.6 F | SYSTOLIC BLOOD PRESSURE: 121 MMHG | WEIGHT: 209.44 LBS

## 2017-02-27 DIAGNOSIS — E86.0 DEHYDRATION: ICD-10-CM

## 2017-02-27 LAB
BASOPHILS # BLD AUTO: 0 K/UL — SIGNIFICANT CHANGE UP (ref 0–0.2)
BASOPHILS NFR BLD AUTO: 0.4 % — SIGNIFICANT CHANGE UP (ref 0–2)
EOSINOPHIL # BLD AUTO: 0.3 K/UL — SIGNIFICANT CHANGE UP (ref 0–0.5)
EOSINOPHIL NFR BLD AUTO: 4.1 % — SIGNIFICANT CHANGE UP (ref 0–6)
HCT VFR BLD CALC: 38.9 % — LOW (ref 39–50)
HGB BLD-MCNC: 12.6 G/DL — LOW (ref 13–17)
LYMPHOCYTES # BLD AUTO: 1.3 K/UL — SIGNIFICANT CHANGE UP (ref 1–3.3)
LYMPHOCYTES # BLD AUTO: 15.4 % — SIGNIFICANT CHANGE UP (ref 13–44)
MCHC RBC-ENTMCNC: 25.5 PG — LOW (ref 27–34)
MCHC RBC-ENTMCNC: 32.2 G/DL — SIGNIFICANT CHANGE UP (ref 32–36)
MCV RBC AUTO: 79 FL — LOW (ref 80–100)
MONOCYTES # BLD AUTO: 0.8 K/UL — SIGNIFICANT CHANGE UP (ref 0–0.9)
MONOCYTES NFR BLD AUTO: 9.4 % — SIGNIFICANT CHANGE UP (ref 2–14)
NEUTROPHILS # BLD AUTO: 5.8 K/UL — SIGNIFICANT CHANGE UP (ref 1.8–7.4)
NEUTROPHILS NFR BLD AUTO: 70.7 % — SIGNIFICANT CHANGE UP (ref 43–77)
PLATELET # BLD AUTO: 423 K/UL — HIGH (ref 150–400)
RBC # BLD: 4.93 M/UL — SIGNIFICANT CHANGE UP (ref 4.2–5.8)
RBC # FLD: 19.3 % — HIGH (ref 10.3–14.5)
WBC # BLD: 8.2 K/UL — SIGNIFICANT CHANGE UP (ref 3.8–10.5)
WBC # FLD AUTO: 8.2 K/UL — SIGNIFICANT CHANGE UP (ref 3.8–10.5)

## 2017-02-28 LAB
ALBUMIN SERPL ELPH-MCNC: 2.8 G/DL
ALP BLD-CCNC: 155 U/L
ALT SERPL-CCNC: 105 U/L
ANION GAP SERPL CALC-SCNC: 18 MMOL/L
AST SERPL-CCNC: 43 U/L
BILIRUB SERPL-MCNC: <0.2 MG/DL
BUN SERPL-MCNC: 21 MG/DL
CALCIUM SERPL-MCNC: 8.6 MG/DL
CHLORIDE SERPL-SCNC: 99 MMOL/L
CO2 SERPL-SCNC: 25 MMOL/L
CREAT SERPL-MCNC: 0.66 MG/DL
FERRITIN SERPL-MCNC: 363.5 NG/ML
GLUCOSE SERPL-MCNC: 85 MG/DL
IRON SATN MFR SERPL: 15 %
IRON SERPL-MCNC: 39 UG/DL
LDH SERPL-CCNC: 173 U/L
POTASSIUM SERPL-SCNC: 4.4 MMOL/L
PROT SERPL-MCNC: 6.8 G/DL
SODIUM SERPL-SCNC: 142 MMOL/L
TIBC SERPL-MCNC: 266 UG/DL
UIBC SERPL-MCNC: 227 UG/DL

## 2017-03-02 ENCOUNTER — RESULT REVIEW (OUTPATIENT)
Age: 37
End: 2017-03-02

## 2017-03-03 ENCOUNTER — LABORATORY RESULT (OUTPATIENT)
Age: 37
End: 2017-03-03

## 2017-03-03 ENCOUNTER — APPOINTMENT (OUTPATIENT)
Dept: INFUSION THERAPY | Facility: HOSPITAL | Age: 37
End: 2017-03-03

## 2017-03-03 LAB
BASOPHILS # BLD AUTO: 0 K/UL — SIGNIFICANT CHANGE UP (ref 0–0.2)
BASOPHILS NFR BLD AUTO: 0.3 % — SIGNIFICANT CHANGE UP (ref 0–2)
EOSINOPHIL # BLD AUTO: 0.7 K/UL — HIGH (ref 0–0.5)
EOSINOPHIL NFR BLD AUTO: 9.2 % — HIGH (ref 0–6)
HCT VFR BLD CALC: 39.2 % — SIGNIFICANT CHANGE UP (ref 39–50)
HGB BLD-MCNC: 12.9 G/DL — LOW (ref 13–17)
LYMPHOCYTES # BLD AUTO: 1.2 K/UL — SIGNIFICANT CHANGE UP (ref 1–3.3)
LYMPHOCYTES # BLD AUTO: 15.6 % — SIGNIFICANT CHANGE UP (ref 13–44)
MCHC RBC-ENTMCNC: 26.1 PG — LOW (ref 27–34)
MCHC RBC-ENTMCNC: 32.9 G/DL — SIGNIFICANT CHANGE UP (ref 32–36)
MCV RBC AUTO: 79.4 FL — LOW (ref 80–100)
MONOCYTES # BLD AUTO: 0.8 K/UL — SIGNIFICANT CHANGE UP (ref 0–0.9)
MONOCYTES NFR BLD AUTO: 10.6 % — SIGNIFICANT CHANGE UP (ref 2–14)
NEUTROPHILS # BLD AUTO: 4.8 K/UL — SIGNIFICANT CHANGE UP (ref 1.8–7.4)
NEUTROPHILS NFR BLD AUTO: 64.3 % — SIGNIFICANT CHANGE UP (ref 43–77)
PLATELET # BLD AUTO: 347 K/UL — SIGNIFICANT CHANGE UP (ref 150–400)
RBC # BLD: 4.94 M/UL — SIGNIFICANT CHANGE UP (ref 4.2–5.8)
RBC # FLD: 18.9 % — HIGH (ref 10.3–14.5)
WBC # BLD: 7.5 K/UL — SIGNIFICANT CHANGE UP (ref 3.8–10.5)
WBC # FLD AUTO: 7.5 K/UL — SIGNIFICANT CHANGE UP (ref 3.8–10.5)

## 2017-03-06 DIAGNOSIS — R11.2 NAUSEA WITH VOMITING, UNSPECIFIED: ICD-10-CM

## 2017-03-06 DIAGNOSIS — Z51.11 ENCOUNTER FOR ANTINEOPLASTIC CHEMOTHERAPY: ICD-10-CM

## 2017-03-08 LAB
CULTURE RESULTS: SIGNIFICANT CHANGE UP
SPECIMEN SOURCE: SIGNIFICANT CHANGE UP

## 2017-03-09 ENCOUNTER — RESULT REVIEW (OUTPATIENT)
Age: 37
End: 2017-03-09

## 2017-03-09 ENCOUNTER — INPATIENT (INPATIENT)
Facility: HOSPITAL | Age: 37
LOS: 25 days | Discharge: HOME | DRG: 820 | End: 2017-04-04
Attending: INTERNAL MEDICINE | Admitting: INTERNAL MEDICINE
Payer: COMMERCIAL

## 2017-03-09 VITALS
RESPIRATION RATE: 27 BRPM | DIASTOLIC BLOOD PRESSURE: 79 MMHG | TEMPERATURE: 97 F | HEART RATE: 122 BPM | SYSTOLIC BLOOD PRESSURE: 139 MMHG | OXYGEN SATURATION: 99 %

## 2017-03-09 DIAGNOSIS — C85.90 NON-HODGKIN LYMPHOMA, UNSPECIFIED, UNSPECIFIED SITE: ICD-10-CM

## 2017-03-09 LAB
ALBUMIN SERPL ELPH-MCNC: 2.8 G/DL — LOW (ref 3.3–5)
ALP SERPL-CCNC: 109 U/L — SIGNIFICANT CHANGE UP (ref 40–120)
ALT FLD-CCNC: 42 U/L RC — SIGNIFICANT CHANGE UP (ref 10–45)
ANION GAP SERPL CALC-SCNC: 13 MMOL/L — SIGNIFICANT CHANGE UP (ref 5–17)
APTT BLD: 33.1 SEC — SIGNIFICANT CHANGE UP (ref 27.5–37.4)
AST SERPL-CCNC: 18 U/L — SIGNIFICANT CHANGE UP (ref 10–40)
BASOPHILS # BLD AUTO: 0.1 K/UL — SIGNIFICANT CHANGE UP (ref 0–0.2)
BASOPHILS NFR BLD AUTO: 0.6 % — SIGNIFICANT CHANGE UP (ref 0–2)
BILIRUB SERPL-MCNC: 0.2 MG/DL — SIGNIFICANT CHANGE UP (ref 0.2–1.2)
BUN SERPL-MCNC: 20 MG/DL — SIGNIFICANT CHANGE UP (ref 7–23)
CALCIUM SERPL-MCNC: 8.8 MG/DL — SIGNIFICANT CHANGE UP (ref 8.4–10.5)
CHLORIDE SERPL-SCNC: 102 MMOL/L — SIGNIFICANT CHANGE UP (ref 96–108)
CO2 SERPL-SCNC: 24 MMOL/L — SIGNIFICANT CHANGE UP (ref 22–31)
CREAT SERPL-MCNC: 0.51 MG/DL — SIGNIFICANT CHANGE UP (ref 0.5–1.3)
EOSINOPHIL # BLD AUTO: 0.4 K/UL — SIGNIFICANT CHANGE UP (ref 0–0.5)
EOSINOPHIL NFR BLD AUTO: 4.7 % — SIGNIFICANT CHANGE UP (ref 0–6)
GLUCOSE SERPL-MCNC: 104 MG/DL — HIGH (ref 70–99)
HCT VFR BLD CALC: 38.2 % — LOW (ref 39–50)
HGB BLD-MCNC: 12.5 G/DL — LOW (ref 13–17)
INR BLD: 1.22 RATIO — HIGH (ref 0.88–1.16)
LYMPHOCYTES # BLD AUTO: 1 K/UL — SIGNIFICANT CHANGE UP (ref 1–3.3)
LYMPHOCYTES # BLD AUTO: 11.9 % — LOW (ref 13–44)
MCHC RBC-ENTMCNC: 26 PG — LOW (ref 27–34)
MCHC RBC-ENTMCNC: 32.7 GM/DL — SIGNIFICANT CHANGE UP (ref 32–36)
MCV RBC AUTO: 79.3 FL — LOW (ref 80–100)
MONOCYTES # BLD AUTO: 0.4 K/UL — SIGNIFICANT CHANGE UP (ref 0–0.9)
MONOCYTES NFR BLD AUTO: 4.5 % — SIGNIFICANT CHANGE UP (ref 2–14)
NEUTROPHILS # BLD AUTO: 6.7 K/UL — SIGNIFICANT CHANGE UP (ref 1.8–7.4)
NEUTROPHILS NFR BLD AUTO: 78.2 % — HIGH (ref 43–77)
PLATELET # BLD AUTO: 344 K/UL — SIGNIFICANT CHANGE UP (ref 150–400)
POTASSIUM SERPL-MCNC: 4.4 MMOL/L — SIGNIFICANT CHANGE UP (ref 3.5–5.3)
POTASSIUM SERPL-SCNC: 4.4 MMOL/L — SIGNIFICANT CHANGE UP (ref 3.5–5.3)
PROT SERPL-MCNC: 6.8 G/DL — SIGNIFICANT CHANGE UP (ref 6–8.3)
PROTHROM AB SERPL-ACNC: 13.2 SEC — HIGH (ref 10–13.1)
RBC # BLD: 4.81 M/UL — SIGNIFICANT CHANGE UP (ref 4.2–5.8)
RBC # FLD: 18 % — HIGH (ref 10.3–14.5)
SODIUM SERPL-SCNC: 139 MMOL/L — SIGNIFICANT CHANGE UP (ref 135–145)
WBC # BLD: 8.6 K/UL — SIGNIFICANT CHANGE UP (ref 3.8–10.5)
WBC # FLD AUTO: 8.6 K/UL — SIGNIFICANT CHANGE UP (ref 3.8–10.5)

## 2017-03-09 PROCEDURE — 99285 EMERGENCY DEPT VISIT HI MDM: CPT

## 2017-03-09 PROCEDURE — 71020: CPT | Mod: 26

## 2017-03-09 NOTE — ED PROVIDER NOTE - OBJECTIVE STATEMENT
35 y/o M recent diagnosis of lymphoma, last chemo 3/3, s/p admission for drainage of bilateral pleural effusions beginning of February, presenting with onset of difficulty breathing and shortness of breath. Pt reports symptoms started yesterday, at which time he had some mild difficulty breathing but was able to tolerate it until today when he felt like he was unable to breathe. He denies any fevers, chills, coughing up blood or mucous, nausea, vomiting.

## 2017-03-09 NOTE — ED PROVIDER NOTE - ATTENDING CONTRIBUTION TO CARE
I performed a history and physical exam of the patient and discussed their management with the advanced care provider. I reviewed the advanced care provider's note and agree with the documented findings and plan of care. My medical decision making and objective findings are found above.  Lungs -egophony, rr 27. O2 sat 97 on RA.  Probably reacumulation of pleural effusion

## 2017-03-09 NOTE — ED PROVIDER NOTE - MEDICAL DECISION MAKING DETAILS
Sarah: 1 day of increasing SOB. Lymphoma, on Chemo last 1 week ago. Will check labs and ensure not neutropenic. CXR looking for effusion.

## 2017-03-09 NOTE — ED ADULT NURSE NOTE - OBJECTIVE STATEMENT
Patient is a 35 yo male c/o of SOB, chest tightness and non productive cough that began yesterday morning. Patient was recently diagnosed with Hodgekins lymphoma, and last chemo treat was last friday. Patient is a 37 yo male c/o of SOB, chest tightness and non productive cough that began yesterday morning. Patient was recently diagnosed with Hodgekins lymphoma, and last chemo treat was last Friday 3/3/17. Patient denies Chest pain, LOC, weakness or dizziness. Lungs sounds clear bilaterally. Ambulates without issue. Denies GI/ issues. No NVD. No headaches fever or chills. Patient has port on R chest. Labs drawn. Updated on plan of care. MD at bedside. Will continue to monitor.

## 2017-03-10 DIAGNOSIS — R06.00 DYSPNEA, UNSPECIFIED: ICD-10-CM

## 2017-03-10 DIAGNOSIS — C81.90 HODGKIN LYMPHOMA, UNSPECIFIED, UNSPECIFIED SITE: ICD-10-CM

## 2017-03-10 DIAGNOSIS — J90 PLEURAL EFFUSION, NOT ELSEWHERE CLASSIFIED: ICD-10-CM

## 2017-03-10 LAB
ALBUMIN FLD-MCNC: 2.2 G/DL — SIGNIFICANT CHANGE UP
ANION GAP SERPL CALC-SCNC: 14 MMOL/L — SIGNIFICANT CHANGE UP (ref 5–17)
B PERT IGG+IGM PNL SER: ABNORMAL
BASOPHILS # BLD AUTO: 0.02 K/UL — SIGNIFICANT CHANGE UP (ref 0–0.2)
BASOPHILS NFR BLD AUTO: 0.3 % — SIGNIFICANT CHANGE UP (ref 0–2)
BLD GP AB SCN SERPL QL: NEGATIVE — SIGNIFICANT CHANGE UP
BUN SERPL-MCNC: 18 MG/DL — SIGNIFICANT CHANGE UP (ref 7–23)
CALCIUM SERPL-MCNC: 8.2 MG/DL — LOW (ref 8.4–10.5)
CHLORIDE SERPL-SCNC: 99 MMOL/L — SIGNIFICANT CHANGE UP (ref 96–108)
CO2 SERPL-SCNC: 24 MMOL/L — SIGNIFICANT CHANGE UP (ref 22–31)
COLOR FLD: YELLOW — SIGNIFICANT CHANGE UP
CREAT SERPL-MCNC: 0.45 MG/DL — LOW (ref 0.5–1.3)
EOSINOPHIL # BLD AUTO: 0.27 K/UL — SIGNIFICANT CHANGE UP (ref 0–0.5)
EOSINOPHIL # FLD: 1 % — SIGNIFICANT CHANGE UP
EOSINOPHIL NFR BLD AUTO: 3.5 % — SIGNIFICANT CHANGE UP (ref 0–6)
FLUID INTAKE SUBSTANCE CLASS: SIGNIFICANT CHANGE UP
FLUID SEGMENTED GRANULOCYTES: 51 % — SIGNIFICANT CHANGE UP
GLUCOSE FLD-MCNC: 110 — SIGNIFICANT CHANGE UP
GLUCOSE SERPL-MCNC: 157 MG/DL — HIGH (ref 70–99)
HCT VFR BLD CALC: 36.6 % — LOW (ref 39–50)
HGB BLD-MCNC: 11.6 G/DL — LOW (ref 13–17)
IMM GRANULOCYTES NFR BLD AUTO: 0.5 % — SIGNIFICANT CHANGE UP (ref 0–1.5)
LDH SERPL L TO P-CCNC: 128 U/L — SIGNIFICANT CHANGE UP
LYMPHOCYTES # BLD AUTO: 0.8 K/UL — LOW (ref 1–3.3)
LYMPHOCYTES # BLD AUTO: 10.5 % — LOW (ref 13–44)
LYMPHOCYTES # FLD: 31 % — SIGNIFICANT CHANGE UP
MAGNESIUM SERPL-MCNC: 1.8 MG/DL — SIGNIFICANT CHANGE UP (ref 1.6–2.6)
MCHC RBC-ENTMCNC: 24.7 PG — LOW (ref 27–34)
MCHC RBC-ENTMCNC: 31.7 GM/DL — LOW (ref 32–36)
MCV RBC AUTO: 78 FL — LOW (ref 80–100)
MONOCYTES # BLD AUTO: 0.43 K/UL — SIGNIFICANT CHANGE UP (ref 0–0.9)
MONOCYTES NFR BLD AUTO: 5.6 % — SIGNIFICANT CHANGE UP (ref 2–14)
MONOS+MACROS # FLD: 17 % — SIGNIFICANT CHANGE UP
NEUTROPHILS # BLD AUTO: 6.07 K/UL — SIGNIFICANT CHANGE UP (ref 1.8–7.4)
NEUTROPHILS NFR BLD AUTO: 79.6 % — HIGH (ref 43–77)
PH FLD: 7.89 — SIGNIFICANT CHANGE UP
PHOSPHATE SERPL-MCNC: 3.6 MG/DL — SIGNIFICANT CHANGE UP (ref 2.5–4.5)
PLATELET # BLD AUTO: 409 K/UL — HIGH (ref 150–400)
POTASSIUM SERPL-MCNC: 4 MMOL/L — SIGNIFICANT CHANGE UP (ref 3.5–5.3)
POTASSIUM SERPL-SCNC: 4 MMOL/L — SIGNIFICANT CHANGE UP (ref 3.5–5.3)
PROT FLD-MCNC: 4.2 G/DL — SIGNIFICANT CHANGE UP
RBC # BLD: 4.69 M/UL — SIGNIFICANT CHANGE UP (ref 4.2–5.8)
RBC # FLD: 18.4 % — HIGH (ref 10.3–14.5)
RCV VOL RI: 1347 /UL — HIGH (ref 0–5)
RH IG SCN BLD-IMP: POSITIVE — SIGNIFICANT CHANGE UP
SODIUM SERPL-SCNC: 137 MMOL/L — SIGNIFICANT CHANGE UP (ref 135–145)
TOTAL NUCLEATED CELL COUNT, BODY FLUID: 813 /UL — HIGH (ref 0–5)
TUBE TYPE: SIGNIFICANT CHANGE UP
WBC # BLD: 7.63 K/UL — SIGNIFICANT CHANGE UP (ref 3.8–10.5)
WBC # FLD AUTO: 7.63 K/UL — SIGNIFICANT CHANGE UP (ref 3.8–10.5)

## 2017-03-10 PROCEDURE — 71010: CPT | Mod: 26

## 2017-03-10 PROCEDURE — 32557 INSERT CATH PLEURA W/ IMAGE: CPT | Mod: RT

## 2017-03-10 PROCEDURE — 88305 TISSUE EXAM BY PATHOLOGIST: CPT | Mod: 26

## 2017-03-10 PROCEDURE — 99223 1ST HOSP IP/OBS HIGH 75: CPT

## 2017-03-10 PROCEDURE — 88112 CYTOPATH CELL ENHANCE TECH: CPT | Mod: 26

## 2017-03-10 PROCEDURE — 99254 IP/OBS CNSLTJ NEW/EST MOD 60: CPT | Mod: GC

## 2017-03-10 RX ORDER — ACETAMINOPHEN 500 MG
650 TABLET ORAL EVERY 6 HOURS
Qty: 0 | Refills: 0 | Status: DISCONTINUED | OUTPATIENT
Start: 2017-03-10 | End: 2017-03-24

## 2017-03-10 RX ORDER — MORPHINE SULFATE 50 MG/1
2 CAPSULE, EXTENDED RELEASE ORAL ONCE
Qty: 0 | Refills: 0 | Status: DISCONTINUED | OUTPATIENT
Start: 2017-03-10 | End: 2017-03-10

## 2017-03-10 RX ORDER — ONDANSETRON 8 MG/1
4 TABLET, FILM COATED ORAL EVERY 4 HOURS
Qty: 0 | Refills: 0 | Status: DISCONTINUED | OUTPATIENT
Start: 2017-03-10 | End: 2017-03-18

## 2017-03-10 RX ORDER — ALLOPURINOL 300 MG
300 TABLET ORAL DAILY
Qty: 0 | Refills: 0 | Status: DISCONTINUED | OUTPATIENT
Start: 2017-03-10 | End: 2017-03-24

## 2017-03-10 RX ORDER — IBUPROFEN 200 MG
1 TABLET ORAL
Qty: 0 | Refills: 0 | COMMUNITY

## 2017-03-10 RX ORDER — ENOXAPARIN SODIUM 100 MG/ML
40 INJECTION SUBCUTANEOUS EVERY 24 HOURS
Qty: 0 | Refills: 0 | Status: DISCONTINUED | OUTPATIENT
Start: 2017-03-10 | End: 2017-03-23

## 2017-03-10 RX ADMIN — MORPHINE SULFATE 2 MILLIGRAM(S): 50 CAPSULE, EXTENDED RELEASE ORAL at 23:25

## 2017-03-10 RX ADMIN — Medication 650 MILLIGRAM(S): at 05:14

## 2017-03-10 RX ADMIN — Medication 650 MILLIGRAM(S): at 09:00

## 2017-03-10 RX ADMIN — Medication 650 MILLIGRAM(S): at 08:23

## 2017-03-10 RX ADMIN — MORPHINE SULFATE 2 MILLIGRAM(S): 50 CAPSULE, EXTENDED RELEASE ORAL at 16:53

## 2017-03-10 RX ADMIN — Medication 300 MILLIGRAM(S): at 12:01

## 2017-03-10 RX ADMIN — MORPHINE SULFATE 2 MILLIGRAM(S): 50 CAPSULE, EXTENDED RELEASE ORAL at 17:45

## 2017-03-10 RX ADMIN — MORPHINE SULFATE 2 MILLIGRAM(S): 50 CAPSULE, EXTENDED RELEASE ORAL at 23:05

## 2017-03-10 RX ADMIN — Medication 650 MILLIGRAM(S): at 03:32

## 2017-03-10 NOTE — H&P ADULT. - PROBLEM SELECTOR PLAN 2
?Malignant effusion vs buildup of fluid due to bulky lymph nodes blocking outflow?  Pending thoracentesis and fluid analysis.  Previous cytopathology of fluid did not show malignant cells

## 2017-03-10 NOTE — H&P ADULT. - PROBLEM SELECTOR PLAN 1
Most likely 2/2 large pleural effusion on R.  Patient with respiratory rate up to 27 initially, improving with oxygen support but still with significant dyspnea if attempting to move.  Will continue O2 support  Pulm consult for thoracentesis in AM

## 2017-03-10 NOTE — H&P ADULT. - ASSESSMENT
36M w/hx of recently diagnosed hodgkin's lymphoma, recent admission here one month ago for shortness of breath 2/2 pleural effusions b/l who now presents with dyspnea and respiratory distress 2/2 large pleural effusion.

## 2017-03-10 NOTE — H&P ADULT. - HISTORY OF PRESENT ILLNESS
36M w/hx of recently diagnosed hodgkin's lymphoma, recent admission here one month ago for shortness of breath 2/2 pleural effusions b/l who now presents with recurrent shortness of breath x 2 days.  Patient reports that over the last 2 days he has noticed significant shortness of breath both at rest and with exertion.  This is associated with a right lower chest and back "pressure-like" sensation that is painful when he coughs, 4-5/10 in intensity.  Patient denies any fevers or chills, denies other chest pains.  Denies noticing palpitations.  No leg swelling though he has had this in the past prior to chemotherapy.  No recent nausea/vomiting though he has PRN zofran at home for use after chemo.

## 2017-03-11 LAB
ALBUMIN SERPL ELPH-MCNC: 2.7 G/DL — LOW (ref 3.3–5)
ALP SERPL-CCNC: 107 U/L — SIGNIFICANT CHANGE UP (ref 40–120)
ALT FLD-CCNC: 35 U/L — SIGNIFICANT CHANGE UP (ref 10–45)
ANION GAP SERPL CALC-SCNC: 13 MMOL/L — SIGNIFICANT CHANGE UP (ref 5–17)
AST SERPL-CCNC: 13 U/L — SIGNIFICANT CHANGE UP (ref 10–40)
BILIRUB SERPL-MCNC: 0.4 MG/DL — SIGNIFICANT CHANGE UP (ref 0.2–1.2)
BUN SERPL-MCNC: 18 MG/DL — SIGNIFICANT CHANGE UP (ref 7–23)
CALCIUM SERPL-MCNC: 9.1 MG/DL — SIGNIFICANT CHANGE UP (ref 8.4–10.5)
CHLORIDE SERPL-SCNC: 97 MMOL/L — SIGNIFICANT CHANGE UP (ref 96–108)
CO2 SERPL-SCNC: 24 MMOL/L — SIGNIFICANT CHANGE UP (ref 22–31)
CREAT SERPL-MCNC: 0.64 MG/DL — SIGNIFICANT CHANGE UP (ref 0.5–1.3)
CULTURE RESULTS: SIGNIFICANT CHANGE UP
GLUCOSE SERPL-MCNC: 161 MG/DL — HIGH (ref 70–99)
GRAM STN FLD: SIGNIFICANT CHANGE UP
LDH SERPL L TO P-CCNC: 251 U/L — HIGH (ref 50–242)
NIGHT BLUE STAIN TISS: SIGNIFICANT CHANGE UP
POTASSIUM SERPL-MCNC: 4.7 MMOL/L — SIGNIFICANT CHANGE UP (ref 3.5–5.3)
POTASSIUM SERPL-SCNC: 4.7 MMOL/L — SIGNIFICANT CHANGE UP (ref 3.5–5.3)
PROT SERPL-MCNC: 6.8 G/DL — SIGNIFICANT CHANGE UP (ref 6–8.3)
SODIUM SERPL-SCNC: 134 MMOL/L — LOW (ref 135–145)
SPECIMEN SOURCE: SIGNIFICANT CHANGE UP

## 2017-03-11 PROCEDURE — 99223 1ST HOSP IP/OBS HIGH 75: CPT

## 2017-03-11 PROCEDURE — 71010: CPT | Mod: 26

## 2017-03-11 RX ORDER — MORPHINE SULFATE 50 MG/1
2 CAPSULE, EXTENDED RELEASE ORAL EVERY 4 HOURS
Qty: 0 | Refills: 0 | Status: DISCONTINUED | OUTPATIENT
Start: 2017-03-11 | End: 2017-03-11

## 2017-03-11 RX ADMIN — ENOXAPARIN SODIUM 40 MILLIGRAM(S): 100 INJECTION SUBCUTANEOUS at 12:21

## 2017-03-11 RX ADMIN — Medication 300 MILLIGRAM(S): at 12:21

## 2017-03-12 PROCEDURE — 71250 CT THORAX DX C-: CPT | Mod: 26

## 2017-03-12 PROCEDURE — 99233 SBSQ HOSP IP/OBS HIGH 50: CPT

## 2017-03-12 PROCEDURE — 71010: CPT | Mod: 26

## 2017-03-12 RX ORDER — FUROSEMIDE 40 MG
20 TABLET ORAL ONCE
Qty: 0 | Refills: 0 | Status: COMPLETED | OUTPATIENT
Start: 2017-03-12 | End: 2017-03-12

## 2017-03-12 RX ADMIN — ONDANSETRON 4 MILLIGRAM(S): 8 TABLET, FILM COATED ORAL at 19:42

## 2017-03-12 RX ADMIN — Medication 20 MILLIGRAM(S): at 11:30

## 2017-03-12 RX ADMIN — Medication 300 MILLIGRAM(S): at 11:30

## 2017-03-12 RX ADMIN — ENOXAPARIN SODIUM 40 MILLIGRAM(S): 100 INJECTION SUBCUTANEOUS at 11:30

## 2017-03-13 LAB
ANION GAP SERPL CALC-SCNC: 9 MMOL/L — SIGNIFICANT CHANGE UP (ref 5–17)
APTT BLD: 30.7 SEC — SIGNIFICANT CHANGE UP (ref 27.5–37.4)
BUN SERPL-MCNC: 15 MG/DL — SIGNIFICANT CHANGE UP (ref 7–23)
CALCIUM SERPL-MCNC: 8.6 MG/DL — SIGNIFICANT CHANGE UP (ref 8.4–10.5)
CHLORIDE SERPL-SCNC: 95 MMOL/L — LOW (ref 96–108)
CO2 SERPL-SCNC: 27 MMOL/L — SIGNIFICANT CHANGE UP (ref 22–31)
CREAT SERPL-MCNC: 0.57 MG/DL — SIGNIFICANT CHANGE UP (ref 0.5–1.3)
GLUCOSE SERPL-MCNC: 102 MG/DL — HIGH (ref 70–99)
HCT VFR BLD CALC: 36.1 % — LOW (ref 39–50)
HGB BLD-MCNC: 11.4 G/DL — LOW (ref 13–17)
INR BLD: 1.19 RATIO — HIGH (ref 0.88–1.16)
MCHC RBC-ENTMCNC: 24.9 PG — LOW (ref 27–34)
MCHC RBC-ENTMCNC: 31.6 GM/DL — LOW (ref 32–36)
MCV RBC AUTO: 78.8 FL — LOW (ref 80–100)
PLATELET # BLD AUTO: 426 K/UL — HIGH (ref 150–400)
POTASSIUM SERPL-MCNC: 4.1 MMOL/L — SIGNIFICANT CHANGE UP (ref 3.5–5.3)
POTASSIUM SERPL-SCNC: 4.1 MMOL/L — SIGNIFICANT CHANGE UP (ref 3.5–5.3)
PROTHROM AB SERPL-ACNC: 13.5 SEC — HIGH (ref 10–13.1)
RBC # BLD: 4.58 M/UL — SIGNIFICANT CHANGE UP (ref 4.2–5.8)
RBC # FLD: 17.9 % — HIGH (ref 10.3–14.5)
SODIUM SERPL-SCNC: 131 MMOL/L — LOW (ref 135–145)
WBC # BLD: 6.21 K/UL — SIGNIFICANT CHANGE UP (ref 3.8–10.5)
WBC # FLD AUTO: 6.21 K/UL — SIGNIFICANT CHANGE UP (ref 3.8–10.5)

## 2017-03-13 PROCEDURE — 49423 EXCHANGE DRAINAGE CATHETER: CPT

## 2017-03-13 PROCEDURE — 99231 SBSQ HOSP IP/OBS SF/LOW 25: CPT | Mod: 57

## 2017-03-13 PROCEDURE — 75984 XRAY CONTROL CATHETER CHANGE: CPT | Mod: 26

## 2017-03-13 PROCEDURE — 71010: CPT | Mod: 26

## 2017-03-13 PROCEDURE — 99254 IP/OBS CNSLTJ NEW/EST MOD 60: CPT

## 2017-03-13 PROCEDURE — 99233 SBSQ HOSP IP/OBS HIGH 50: CPT

## 2017-03-13 PROCEDURE — 93306 TTE W/DOPPLER COMPLETE: CPT | Mod: 26

## 2017-03-13 RX ORDER — VANCOMYCIN HCL 1 G
1000 VIAL (EA) INTRAVENOUS ONCE
Qty: 0 | Refills: 0 | Status: COMPLETED | OUTPATIENT
Start: 2017-03-13 | End: 2017-03-13

## 2017-03-13 RX ORDER — FUROSEMIDE 40 MG
20 TABLET ORAL DAILY
Qty: 0 | Refills: 0 | Status: DISCONTINUED | OUTPATIENT
Start: 2017-03-13 | End: 2017-03-15

## 2017-03-13 RX ORDER — PIPERACILLIN AND TAZOBACTAM 4; .5 G/20ML; G/20ML
3.38 INJECTION, POWDER, LYOPHILIZED, FOR SOLUTION INTRAVENOUS ONCE
Qty: 0 | Refills: 0 | Status: COMPLETED | OUTPATIENT
Start: 2017-03-13 | End: 2017-03-13

## 2017-03-13 RX ORDER — SODIUM CHLORIDE 9 MG/ML
250 INJECTION INTRAMUSCULAR; INTRAVENOUS; SUBCUTANEOUS
Qty: 0 | Refills: 0 | Status: COMPLETED | OUTPATIENT
Start: 2017-03-13 | End: 2017-03-13

## 2017-03-13 RX ORDER — PIPERACILLIN AND TAZOBACTAM 4; .5 G/20ML; G/20ML
3.38 INJECTION, POWDER, LYOPHILIZED, FOR SOLUTION INTRAVENOUS EVERY 8 HOURS
Qty: 0 | Refills: 0 | Status: DISCONTINUED | OUTPATIENT
Start: 2017-03-13 | End: 2017-03-14

## 2017-03-13 RX ADMIN — Medication 300 MILLIGRAM(S): at 11:44

## 2017-03-13 RX ADMIN — SODIUM CHLORIDE 250 MILLILITER(S): 9 INJECTION INTRAMUSCULAR; INTRAVENOUS; SUBCUTANEOUS at 16:43

## 2017-03-13 RX ADMIN — Medication 250 MILLIGRAM(S): at 12:34

## 2017-03-13 RX ADMIN — PIPERACILLIN AND TAZOBACTAM 200 GRAM(S): 4; .5 INJECTION, POWDER, LYOPHILIZED, FOR SOLUTION INTRAVENOUS at 19:14

## 2017-03-13 RX ADMIN — Medication 20 MILLIGRAM(S): at 11:45

## 2017-03-13 RX ADMIN — PIPERACILLIN AND TAZOBACTAM 25 GRAM(S): 4; .5 INJECTION, POWDER, LYOPHILIZED, FOR SOLUTION INTRAVENOUS at 22:41

## 2017-03-13 NOTE — PROVIDER CONTACT NOTE (CRITICAL VALUE NOTIFICATION) - TEST AND RESULT REPORTED:
Body fluid culture pleural fluid preliminary growth fluid media only gram positive cocci in pairs and chains

## 2017-03-14 LAB
-  CEFAZOLIN: SIGNIFICANT CHANGE UP
-  CLINDAMYCIN: SIGNIFICANT CHANGE UP
-  PENICILLIN: SIGNIFICANT CHANGE UP
ANION GAP SERPL CALC-SCNC: 11 MMOL/L — SIGNIFICANT CHANGE UP (ref 5–17)
BASOPHILS # BLD AUTO: 0 K/UL — SIGNIFICANT CHANGE UP (ref 0–0.2)
BASOPHILS # BLD AUTO: 0.01 K/UL — SIGNIFICANT CHANGE UP (ref 0–0.2)
BASOPHILS NFR BLD AUTO: 0.2 % — SIGNIFICANT CHANGE UP (ref 0–2)
BASOPHILS NFR BLD AUTO: 0.9 % — SIGNIFICANT CHANGE UP (ref 0–2)
BUN SERPL-MCNC: 13 MG/DL — SIGNIFICANT CHANGE UP (ref 7–23)
CALCIUM SERPL-MCNC: 8.7 MG/DL — SIGNIFICANT CHANGE UP (ref 8.4–10.5)
CHLORIDE SERPL-SCNC: 95 MMOL/L — LOW (ref 96–108)
CO2 SERPL-SCNC: 27 MMOL/L — SIGNIFICANT CHANGE UP (ref 22–31)
CREAT SERPL-MCNC: 0.65 MG/DL — SIGNIFICANT CHANGE UP (ref 0.5–1.3)
EOSINOPHIL # BLD AUTO: 0.2 K/UL — SIGNIFICANT CHANGE UP (ref 0–0.5)
EOSINOPHIL # BLD AUTO: 0.23 K/UL — SIGNIFICANT CHANGE UP (ref 0–0.5)
EOSINOPHIL NFR BLD AUTO: 3.7 % — SIGNIFICANT CHANGE UP (ref 0–6)
EOSINOPHIL NFR BLD AUTO: 4.7 % — SIGNIFICANT CHANGE UP (ref 0–6)
GLUCOSE SERPL-MCNC: 140 MG/DL — HIGH (ref 70–99)
HCT VFR BLD CALC: 35.8 % — LOW (ref 39–50)
HCT VFR BLD CALC: 37.9 % — LOW (ref 39–50)
HGB BLD-MCNC: 12.1 G/DL — LOW (ref 13–17)
HGB BLD-MCNC: 12.1 G/DL — LOW (ref 13–17)
IMM GRANULOCYTES NFR BLD AUTO: 0.4 % — SIGNIFICANT CHANGE UP (ref 0–1.5)
LYMPHOCYTES # BLD AUTO: 0.6 K/UL — LOW (ref 1–3.3)
LYMPHOCYTES # BLD AUTO: 0.74 K/UL — LOW (ref 1–3.3)
LYMPHOCYTES # BLD AUTO: 12.2 % — LOW (ref 13–44)
LYMPHOCYTES # BLD AUTO: 15 % — SIGNIFICANT CHANGE UP (ref 13–44)
MCHC RBC-ENTMCNC: 25.3 PG — LOW (ref 27–34)
MCHC RBC-ENTMCNC: 27.1 PG — SIGNIFICANT CHANGE UP (ref 27–34)
MCHC RBC-ENTMCNC: 31.9 GM/DL — LOW (ref 32–36)
MCHC RBC-ENTMCNC: 33.8 GM/DL — SIGNIFICANT CHANGE UP (ref 32–36)
MCV RBC AUTO: 79.1 FL — LOW (ref 80–100)
MCV RBC AUTO: 80.2 FL — SIGNIFICANT CHANGE UP (ref 80–100)
METHOD TYPE: SIGNIFICANT CHANGE UP
METHOD TYPE: SIGNIFICANT CHANGE UP
MONOCYTES # BLD AUTO: 0.59 K/UL — SIGNIFICANT CHANGE UP (ref 0–0.9)
MONOCYTES # BLD AUTO: 0.7 K/UL — SIGNIFICANT CHANGE UP (ref 0–0.9)
MONOCYTES NFR BLD AUTO: 11.9 % — SIGNIFICANT CHANGE UP (ref 2–14)
MONOCYTES NFR BLD AUTO: 13.6 % — SIGNIFICANT CHANGE UP (ref 2–14)
NEUTROPHILS # BLD AUTO: 3.35 K/UL — SIGNIFICANT CHANGE UP (ref 1.8–7.4)
NEUTROPHILS # BLD AUTO: 3.6 K/UL — SIGNIFICANT CHANGE UP (ref 1.8–7.4)
NEUTROPHILS NFR BLD AUTO: 67.8 % — SIGNIFICANT CHANGE UP (ref 43–77)
NEUTROPHILS NFR BLD AUTO: 69.7 % — SIGNIFICANT CHANGE UP (ref 43–77)
PLATELET # BLD AUTO: 361 K/UL — SIGNIFICANT CHANGE UP (ref 150–400)
PLATELET # BLD AUTO: 403 K/UL — HIGH (ref 150–400)
POTASSIUM SERPL-MCNC: 3.5 MMOL/L — SIGNIFICANT CHANGE UP (ref 3.5–5.3)
POTASSIUM SERPL-SCNC: 3.5 MMOL/L — SIGNIFICANT CHANGE UP (ref 3.5–5.3)
RBC # BLD: 4.46 M/UL — SIGNIFICANT CHANGE UP (ref 4.2–5.8)
RBC # BLD: 4.79 M/UL — SIGNIFICANT CHANGE UP (ref 4.2–5.8)
RBC # FLD: 17.2 % — HIGH (ref 10.3–14.5)
RBC # FLD: 18 % — HIGH (ref 10.3–14.5)
SODIUM SERPL-SCNC: 133 MMOL/L — LOW (ref 135–145)
WBC # BLD: 4.94 K/UL — SIGNIFICANT CHANGE UP (ref 3.8–10.5)
WBC # BLD: 5.1 K/UL — SIGNIFICANT CHANGE UP (ref 3.8–10.5)
WBC # FLD AUTO: 4.94 K/UL — SIGNIFICANT CHANGE UP (ref 3.8–10.5)
WBC # FLD AUTO: 5.1 K/UL — SIGNIFICANT CHANGE UP (ref 3.8–10.5)

## 2017-03-14 PROCEDURE — 99233 SBSQ HOSP IP/OBS HIGH 50: CPT

## 2017-03-14 PROCEDURE — 99231 SBSQ HOSP IP/OBS SF/LOW 25: CPT

## 2017-03-14 PROCEDURE — 71010: CPT | Mod: 26

## 2017-03-14 PROCEDURE — 99232 SBSQ HOSP IP/OBS MODERATE 35: CPT

## 2017-03-14 RX ORDER — SENNA PLUS 8.6 MG/1
2 TABLET ORAL AT BEDTIME
Qty: 0 | Refills: 0 | Status: DISCONTINUED | OUTPATIENT
Start: 2017-03-14 | End: 2017-03-24

## 2017-03-14 RX ORDER — METOPROLOL TARTRATE 50 MG
25 TABLET ORAL
Qty: 0 | Refills: 0 | Status: DISCONTINUED | OUTPATIENT
Start: 2017-03-14 | End: 2017-03-24

## 2017-03-14 RX ORDER — AMPICILLIN SODIUM AND SULBACTAM SODIUM 250; 125 MG/ML; MG/ML
3 INJECTION, POWDER, FOR SUSPENSION INTRAMUSCULAR; INTRAVENOUS EVERY 6 HOURS
Qty: 0 | Refills: 0 | Status: DISCONTINUED | OUTPATIENT
Start: 2017-03-14 | End: 2017-03-24

## 2017-03-14 RX ORDER — DOCUSATE SODIUM 100 MG
100 CAPSULE ORAL
Qty: 0 | Refills: 0 | Status: DISCONTINUED | OUTPATIENT
Start: 2017-03-14 | End: 2017-03-24

## 2017-03-14 RX ADMIN — Medication 100 MILLIGRAM(S): at 21:10

## 2017-03-14 RX ADMIN — ENOXAPARIN SODIUM 40 MILLIGRAM(S): 100 INJECTION SUBCUTANEOUS at 12:05

## 2017-03-14 RX ADMIN — Medication 300 MILLIGRAM(S): at 12:05

## 2017-03-14 RX ADMIN — AMPICILLIN SODIUM AND SULBACTAM SODIUM 200 GRAM(S): 250; 125 INJECTION, POWDER, FOR SUSPENSION INTRAMUSCULAR; INTRAVENOUS at 23:23

## 2017-03-14 RX ADMIN — SENNA PLUS 2 TABLET(S): 8.6 TABLET ORAL at 21:08

## 2017-03-14 RX ADMIN — PIPERACILLIN AND TAZOBACTAM 25 GRAM(S): 4; .5 INJECTION, POWDER, LYOPHILIZED, FOR SOLUTION INTRAVENOUS at 05:18

## 2017-03-14 RX ADMIN — AMPICILLIN SODIUM AND SULBACTAM SODIUM 200 GRAM(S): 250; 125 INJECTION, POWDER, FOR SUSPENSION INTRAMUSCULAR; INTRAVENOUS at 18:14

## 2017-03-14 RX ADMIN — Medication 20 MILLIGRAM(S): at 05:16

## 2017-03-14 RX ADMIN — AMPICILLIN SODIUM AND SULBACTAM SODIUM 200 GRAM(S): 250; 125 INJECTION, POWDER, FOR SUSPENSION INTRAMUSCULAR; INTRAVENOUS at 12:05

## 2017-03-15 LAB
ANION GAP SERPL CALC-SCNC: 10 MMOL/L — SIGNIFICANT CHANGE UP (ref 5–17)
BUN SERPL-MCNC: 12 MG/DL — SIGNIFICANT CHANGE UP (ref 7–23)
CALCIUM SERPL-MCNC: 8.3 MG/DL — LOW (ref 8.4–10.5)
CHLORIDE SERPL-SCNC: 98 MMOL/L — SIGNIFICANT CHANGE UP (ref 96–108)
CO2 SERPL-SCNC: 27 MMOL/L — SIGNIFICANT CHANGE UP (ref 22–31)
CREAT SERPL-MCNC: 0.48 MG/DL — LOW (ref 0.5–1.3)
CULTURE RESULTS: SIGNIFICANT CHANGE UP
GLUCOSE SERPL-MCNC: 96 MG/DL — SIGNIFICANT CHANGE UP (ref 70–99)
HCT VFR BLD CALC: 34.2 % — LOW (ref 39–50)
HGB BLD-MCNC: 10.9 G/DL — LOW (ref 13–17)
MAGNESIUM SERPL-MCNC: 1.9 MG/DL — SIGNIFICANT CHANGE UP (ref 1.6–2.6)
MCHC RBC-ENTMCNC: 24.9 PG — LOW (ref 27–34)
MCHC RBC-ENTMCNC: 31.9 GM/DL — LOW (ref 32–36)
MCV RBC AUTO: 78.1 FL — LOW (ref 80–100)
NON-GYNECOLOGICAL CYTOLOGY STUDY: SIGNIFICANT CHANGE UP
ORGANISM # SPEC MICROSCOPIC CNT: SIGNIFICANT CHANGE UP
PLATELET # BLD AUTO: 387 K/UL — SIGNIFICANT CHANGE UP (ref 150–400)
POTASSIUM SERPL-MCNC: 3.8 MMOL/L — SIGNIFICANT CHANGE UP (ref 3.5–5.3)
POTASSIUM SERPL-SCNC: 3.8 MMOL/L — SIGNIFICANT CHANGE UP (ref 3.5–5.3)
RBC # BLD: 4.38 M/UL — SIGNIFICANT CHANGE UP (ref 4.2–5.8)
RBC # FLD: 17.5 % — HIGH (ref 10.3–14.5)
SODIUM SERPL-SCNC: 135 MMOL/L — SIGNIFICANT CHANGE UP (ref 135–145)
SPECIMEN SOURCE: SIGNIFICANT CHANGE UP
WBC # BLD: 2.79 K/UL — LOW (ref 3.8–10.5)
WBC # FLD AUTO: 2.79 K/UL — LOW (ref 3.8–10.5)

## 2017-03-15 PROCEDURE — 99232 SBSQ HOSP IP/OBS MODERATE 35: CPT | Mod: GC

## 2017-03-15 PROCEDURE — 99232 SBSQ HOSP IP/OBS MODERATE 35: CPT

## 2017-03-15 PROCEDURE — 99233 SBSQ HOSP IP/OBS HIGH 50: CPT

## 2017-03-15 PROCEDURE — 71010: CPT | Mod: 26

## 2017-03-15 RX ORDER — FUROSEMIDE 40 MG
20 TABLET ORAL EVERY 12 HOURS
Qty: 0 | Refills: 0 | Status: DISCONTINUED | OUTPATIENT
Start: 2017-03-15 | End: 2017-03-24

## 2017-03-15 RX ADMIN — Medication 300 MILLIGRAM(S): at 13:34

## 2017-03-15 RX ADMIN — ENOXAPARIN SODIUM 40 MILLIGRAM(S): 100 INJECTION SUBCUTANEOUS at 13:35

## 2017-03-15 RX ADMIN — AMPICILLIN SODIUM AND SULBACTAM SODIUM 200 GRAM(S): 250; 125 INJECTION, POWDER, FOR SUSPENSION INTRAMUSCULAR; INTRAVENOUS at 06:29

## 2017-03-15 RX ADMIN — SENNA PLUS 2 TABLET(S): 8.6 TABLET ORAL at 21:05

## 2017-03-15 RX ADMIN — AMPICILLIN SODIUM AND SULBACTAM SODIUM 200 GRAM(S): 250; 125 INJECTION, POWDER, FOR SUSPENSION INTRAMUSCULAR; INTRAVENOUS at 21:04

## 2017-03-15 RX ADMIN — AMPICILLIN SODIUM AND SULBACTAM SODIUM 200 GRAM(S): 250; 125 INJECTION, POWDER, FOR SUSPENSION INTRAMUSCULAR; INTRAVENOUS at 13:35

## 2017-03-15 RX ADMIN — Medication 100 MILLIGRAM(S): at 06:29

## 2017-03-15 RX ADMIN — Medication 100 MILLIGRAM(S): at 17:39

## 2017-03-15 RX ADMIN — Medication 20 MILLIGRAM(S): at 06:29

## 2017-03-15 RX ADMIN — Medication 20 MILLIGRAM(S): at 17:39

## 2017-03-16 ENCOUNTER — OUTPATIENT (OUTPATIENT)
Dept: OUTPATIENT SERVICES | Facility: HOSPITAL | Age: 37
LOS: 1 days | Discharge: HOME | End: 2017-03-16

## 2017-03-16 DIAGNOSIS — C81.90 HODGKIN LYMPHOMA, UNSPECIFIED, UNSPECIFIED SITE: ICD-10-CM

## 2017-03-16 PROBLEM — C85.90 NON-HODGKIN LYMPHOMA, UNSPECIFIED, UNSPECIFIED SITE: Chronic | Status: ACTIVE | Noted: 2017-03-09

## 2017-03-16 LAB
ANION GAP SERPL CALC-SCNC: 11 MMOL/L — SIGNIFICANT CHANGE UP (ref 5–17)
BUN SERPL-MCNC: 13 MG/DL — SIGNIFICANT CHANGE UP (ref 7–23)
CALCIUM SERPL-MCNC: 8.3 MG/DL — LOW (ref 8.4–10.5)
CHLORIDE SERPL-SCNC: 97 MMOL/L — SIGNIFICANT CHANGE UP (ref 96–108)
CO2 SERPL-SCNC: 29 MMOL/L — SIGNIFICANT CHANGE UP (ref 22–31)
CREAT SERPL-MCNC: 0.6 MG/DL — SIGNIFICANT CHANGE UP (ref 0.5–1.3)
GLUCOSE SERPL-MCNC: 128 MG/DL — HIGH (ref 70–99)
HCT VFR BLD CALC: 36.1 % — LOW (ref 39–50)
HGB BLD-MCNC: 11.8 G/DL — LOW (ref 13–17)
MCHC RBC-ENTMCNC: 25.5 PG — LOW (ref 27–34)
MCHC RBC-ENTMCNC: 32.7 GM/DL — SIGNIFICANT CHANGE UP (ref 32–36)
MCV RBC AUTO: 78 FL — LOW (ref 80–100)
PLATELET # BLD AUTO: 340 K/UL — SIGNIFICANT CHANGE UP (ref 150–400)
POTASSIUM SERPL-MCNC: 3.6 MMOL/L — SIGNIFICANT CHANGE UP (ref 3.5–5.3)
POTASSIUM SERPL-SCNC: 3.6 MMOL/L — SIGNIFICANT CHANGE UP (ref 3.5–5.3)
RBC # BLD: 4.63 M/UL — SIGNIFICANT CHANGE UP (ref 4.2–5.8)
RBC # FLD: 17.5 % — HIGH (ref 10.3–14.5)
SODIUM SERPL-SCNC: 137 MMOL/L — SIGNIFICANT CHANGE UP (ref 135–145)
WBC # BLD: 2.4 K/UL — LOW (ref 3.8–10.5)
WBC # FLD AUTO: 2.4 K/UL — LOW (ref 3.8–10.5)

## 2017-03-16 PROCEDURE — 78472 GATED HEART PLANAR SINGLE: CPT | Mod: 26

## 2017-03-16 PROCEDURE — 99233 SBSQ HOSP IP/OBS HIGH 50: CPT

## 2017-03-16 PROCEDURE — 71010: CPT | Mod: 26

## 2017-03-16 PROCEDURE — 99232 SBSQ HOSP IP/OBS MODERATE 35: CPT

## 2017-03-16 PROCEDURE — 99231 SBSQ HOSP IP/OBS SF/LOW 25: CPT

## 2017-03-16 RX ADMIN — ENOXAPARIN SODIUM 40 MILLIGRAM(S): 100 INJECTION SUBCUTANEOUS at 12:24

## 2017-03-16 RX ADMIN — SENNA PLUS 2 TABLET(S): 8.6 TABLET ORAL at 21:14

## 2017-03-16 RX ADMIN — Medication 20 MILLIGRAM(S): at 18:09

## 2017-03-16 RX ADMIN — Medication 20 MILLIGRAM(S): at 05:39

## 2017-03-16 RX ADMIN — Medication 100 MILLIGRAM(S): at 18:08

## 2017-03-16 RX ADMIN — AMPICILLIN SODIUM AND SULBACTAM SODIUM 200 GRAM(S): 250; 125 INJECTION, POWDER, FOR SUSPENSION INTRAMUSCULAR; INTRAVENOUS at 12:24

## 2017-03-16 RX ADMIN — AMPICILLIN SODIUM AND SULBACTAM SODIUM 200 GRAM(S): 250; 125 INJECTION, POWDER, FOR SUSPENSION INTRAMUSCULAR; INTRAVENOUS at 18:09

## 2017-03-16 RX ADMIN — Medication 100 MILLIGRAM(S): at 05:39

## 2017-03-16 RX ADMIN — Medication 300 MILLIGRAM(S): at 12:23

## 2017-03-16 RX ADMIN — AMPICILLIN SODIUM AND SULBACTAM SODIUM 200 GRAM(S): 250; 125 INJECTION, POWDER, FOR SUSPENSION INTRAMUSCULAR; INTRAVENOUS at 03:11

## 2017-03-17 ENCOUNTER — APPOINTMENT (OUTPATIENT)
Dept: INFUSION THERAPY | Facility: HOSPITAL | Age: 37
End: 2017-03-17

## 2017-03-17 PROCEDURE — 99231 SBSQ HOSP IP/OBS SF/LOW 25: CPT

## 2017-03-17 PROCEDURE — 99233 SBSQ HOSP IP/OBS HIGH 50: CPT

## 2017-03-17 PROCEDURE — 99232 SBSQ HOSP IP/OBS MODERATE 35: CPT | Mod: GC

## 2017-03-17 PROCEDURE — 71010: CPT | Mod: 26

## 2017-03-17 PROCEDURE — 99232 SBSQ HOSP IP/OBS MODERATE 35: CPT

## 2017-03-17 RX ORDER — ALTEPLASE 100 MG
2 KIT INTRAVENOUS ONCE
Qty: 0 | Refills: 0 | Status: COMPLETED | OUTPATIENT
Start: 2017-03-17 | End: 2017-03-17

## 2017-03-17 RX ORDER — SOD,AMMONIUM,POTASSIUM LACTATE
1 CREAM (GRAM) TOPICAL
Qty: 0 | Refills: 0 | Status: DISCONTINUED | OUTPATIENT
Start: 2017-03-17 | End: 2017-03-24

## 2017-03-17 RX ADMIN — Medication 20 MILLIGRAM(S): at 06:54

## 2017-03-17 RX ADMIN — AMPICILLIN SODIUM AND SULBACTAM SODIUM 200 GRAM(S): 250; 125 INJECTION, POWDER, FOR SUSPENSION INTRAMUSCULAR; INTRAVENOUS at 18:33

## 2017-03-17 RX ADMIN — AMPICILLIN SODIUM AND SULBACTAM SODIUM 200 GRAM(S): 250; 125 INJECTION, POWDER, FOR SUSPENSION INTRAMUSCULAR; INTRAVENOUS at 12:16

## 2017-03-17 RX ADMIN — Medication 100 MILLIGRAM(S): at 18:33

## 2017-03-17 RX ADMIN — Medication 1 APPLICATION(S): at 21:48

## 2017-03-17 RX ADMIN — Medication 20 MILLIGRAM(S): at 18:33

## 2017-03-17 RX ADMIN — Medication 25 MILLIGRAM(S): at 18:33

## 2017-03-17 RX ADMIN — SENNA PLUS 2 TABLET(S): 8.6 TABLET ORAL at 21:47

## 2017-03-17 RX ADMIN — Medication 25 MILLIGRAM(S): at 06:54

## 2017-03-17 RX ADMIN — ENOXAPARIN SODIUM 40 MILLIGRAM(S): 100 INJECTION SUBCUTANEOUS at 12:10

## 2017-03-17 RX ADMIN — Medication 100 MILLIGRAM(S): at 06:54

## 2017-03-17 RX ADMIN — Medication 300 MILLIGRAM(S): at 12:10

## 2017-03-17 RX ADMIN — AMPICILLIN SODIUM AND SULBACTAM SODIUM 200 GRAM(S): 250; 125 INJECTION, POWDER, FOR SUSPENSION INTRAMUSCULAR; INTRAVENOUS at 00:41

## 2017-03-17 RX ADMIN — AMPICILLIN SODIUM AND SULBACTAM SODIUM 200 GRAM(S): 250; 125 INJECTION, POWDER, FOR SUSPENSION INTRAMUSCULAR; INTRAVENOUS at 06:54

## 2017-03-17 RX ADMIN — ALTEPLASE 2 MILLIGRAM(S): KIT at 16:45

## 2017-03-18 LAB
ANION GAP SERPL CALC-SCNC: 9 MMOL/L — SIGNIFICANT CHANGE UP (ref 5–17)
BUN SERPL-MCNC: 13 MG/DL — SIGNIFICANT CHANGE UP (ref 7–23)
CALCIUM SERPL-MCNC: 8.5 MG/DL — SIGNIFICANT CHANGE UP (ref 8.4–10.5)
CHLORIDE SERPL-SCNC: 103 MMOL/L — SIGNIFICANT CHANGE UP (ref 96–108)
CO2 SERPL-SCNC: 29 MMOL/L — SIGNIFICANT CHANGE UP (ref 22–31)
CREAT SERPL-MCNC: 0.62 MG/DL — SIGNIFICANT CHANGE UP (ref 0.5–1.3)
GLUCOSE SERPL-MCNC: 93 MG/DL — SIGNIFICANT CHANGE UP (ref 70–99)
GRAM STN FLD: SIGNIFICANT CHANGE UP
GRAM STN FLD: SIGNIFICANT CHANGE UP
HCT VFR BLD CALC: 35.4 % — LOW (ref 39–50)
HCT VFR BLD CALC: 36 % — LOW (ref 39–50)
HGB BLD-MCNC: 11.2 G/DL — LOW (ref 13–17)
HGB BLD-MCNC: 11.6 G/DL — LOW (ref 13–17)
MCHC RBC-ENTMCNC: 24.8 PG — LOW (ref 27–34)
MCHC RBC-ENTMCNC: 25.7 PG — LOW (ref 27–34)
MCHC RBC-ENTMCNC: 31.6 GM/DL — LOW (ref 32–36)
MCHC RBC-ENTMCNC: 32.2 GM/DL — SIGNIFICANT CHANGE UP (ref 32–36)
MCV RBC AUTO: 78.3 FL — LOW (ref 80–100)
MCV RBC AUTO: 79.8 FL — LOW (ref 80–100)
PLATELET # BLD AUTO: 367 K/UL — SIGNIFICANT CHANGE UP (ref 150–400)
PLATELET # BLD AUTO: 399 K/UL — SIGNIFICANT CHANGE UP (ref 150–400)
POTASSIUM SERPL-MCNC: 3.6 MMOL/L — SIGNIFICANT CHANGE UP (ref 3.5–5.3)
POTASSIUM SERPL-SCNC: 3.6 MMOL/L — SIGNIFICANT CHANGE UP (ref 3.5–5.3)
RBC # BLD: 4.51 M/UL — SIGNIFICANT CHANGE UP (ref 4.2–5.8)
RBC # BLD: 4.52 M/UL — SIGNIFICANT CHANGE UP (ref 4.2–5.8)
RBC # FLD: 16.7 % — HIGH (ref 10.3–14.5)
RBC # FLD: 17.3 % — HIGH (ref 10.3–14.5)
SODIUM SERPL-SCNC: 141 MMOL/L — SIGNIFICANT CHANGE UP (ref 135–145)
SPECIMEN SOURCE: SIGNIFICANT CHANGE UP
WBC # BLD: 2.77 K/UL — LOW (ref 3.8–10.5)
WBC # BLD: 2.9 K/UL — LOW (ref 3.8–10.5)
WBC # FLD AUTO: 2.77 K/UL — LOW (ref 3.8–10.5)
WBC # FLD AUTO: 2.9 K/UL — LOW (ref 3.8–10.5)

## 2017-03-18 PROCEDURE — 99233 SBSQ HOSP IP/OBS HIGH 50: CPT

## 2017-03-18 PROCEDURE — 99232 SBSQ HOSP IP/OBS MODERATE 35: CPT

## 2017-03-18 RX ORDER — ACETAMINOPHEN 500 MG
650 TABLET ORAL ONCE
Qty: 0 | Refills: 0 | Status: COMPLETED | OUTPATIENT
Start: 2017-03-18 | End: 2017-03-18

## 2017-03-18 RX ORDER — POLYETHYLENE GLYCOL 3350 17 G/17G
17 POWDER, FOR SOLUTION ORAL DAILY
Qty: 0 | Refills: 0 | Status: DISCONTINUED | OUTPATIENT
Start: 2017-03-18 | End: 2017-03-24

## 2017-03-18 RX ORDER — SIMETHICONE 80 MG/1
80 TABLET, CHEWABLE ORAL ONCE
Qty: 0 | Refills: 0 | Status: COMPLETED | OUTPATIENT
Start: 2017-03-18 | End: 2017-03-18

## 2017-03-18 RX ORDER — DEXAMETHASONE 0.5 MG/5ML
8 ELIXIR ORAL ONCE
Qty: 0 | Refills: 0 | Status: COMPLETED | OUTPATIENT
Start: 2017-03-18 | End: 2017-03-18

## 2017-03-18 RX ORDER — DACARBAZINE 10 MG/ML
776 INJECTION, POWDER, FOR SOLUTION INTRAVENOUS ONCE
Qty: 0 | Refills: 0 | Status: COMPLETED | OUTPATIENT
Start: 2017-03-18 | End: 2017-03-18

## 2017-03-18 RX ORDER — DIPHENHYDRAMINE HCL 50 MG
50 CAPSULE ORAL ONCE
Qty: 0 | Refills: 0 | Status: COMPLETED | OUTPATIENT
Start: 2017-03-18 | End: 2017-03-18

## 2017-03-18 RX ORDER — METOCLOPRAMIDE HCL 10 MG
10 TABLET ORAL EVERY 6 HOURS
Qty: 0 | Refills: 0 | Status: DISCONTINUED | OUTPATIENT
Start: 2017-03-18 | End: 2017-03-24

## 2017-03-18 RX ORDER — DOXORUBICIN HYDROCHLORIDE 2 MG/ML
52 INJECTION, SOLUTION INTRAVENOUS ONCE
Qty: 0 | Refills: 0 | Status: COMPLETED | OUTPATIENT
Start: 2017-03-18 | End: 2017-03-18

## 2017-03-18 RX ORDER — ONDANSETRON 8 MG/1
8 TABLET, FILM COATED ORAL EVERY 8 HOURS
Qty: 0 | Refills: 0 | Status: DISCONTINUED | OUTPATIENT
Start: 2017-03-18 | End: 2017-03-24

## 2017-03-18 RX ORDER — VINBLASTINE SULFATE 10 MG
12 VIAL (EA) INTRAVENOUS ONCE
Qty: 0 | Refills: 0 | Status: COMPLETED | OUTPATIENT
Start: 2017-03-18 | End: 2017-03-18

## 2017-03-18 RX ORDER — FOSAPREPITANT DIMEGLUMINE 150 MG/5ML
150 INJECTION, POWDER, LYOPHILIZED, FOR SOLUTION INTRAVENOUS ONCE
Qty: 0 | Refills: 0 | Status: COMPLETED | OUTPATIENT
Start: 2017-03-18 | End: 2017-03-18

## 2017-03-18 RX ORDER — DEXAMETHASONE 0.5 MG/5ML
8 ELIXIR ORAL DAILY
Qty: 0 | Refills: 0 | Status: COMPLETED | OUTPATIENT
Start: 2017-03-19 | End: 2017-03-20

## 2017-03-18 RX ADMIN — SIMETHICONE 80 MILLIGRAM(S): 80 TABLET, CHEWABLE ORAL at 21:25

## 2017-03-18 RX ADMIN — AMPICILLIN SODIUM AND SULBACTAM SODIUM 200 GRAM(S): 250; 125 INJECTION, POWDER, FOR SUSPENSION INTRAMUSCULAR; INTRAVENOUS at 21:26

## 2017-03-18 RX ADMIN — AMPICILLIN SODIUM AND SULBACTAM SODIUM 200 GRAM(S): 250; 125 INJECTION, POWDER, FOR SUSPENSION INTRAMUSCULAR; INTRAVENOUS at 01:05

## 2017-03-18 RX ADMIN — Medication 101.6 MILLIGRAM(S): at 11:49

## 2017-03-18 RX ADMIN — POLYETHYLENE GLYCOL 3350 17 GRAM(S): 17 POWDER, FOR SOLUTION ORAL at 21:26

## 2017-03-18 RX ADMIN — Medication 1 APPLICATION(S): at 20:47

## 2017-03-18 RX ADMIN — Medication 100 MILLIGRAM(S): at 18:23

## 2017-03-18 RX ADMIN — Medication 10 MILLIGRAM(S): at 15:05

## 2017-03-18 RX ADMIN — Medication 20 MILLIGRAM(S): at 06:16

## 2017-03-18 RX ADMIN — ENOXAPARIN SODIUM 40 MILLIGRAM(S): 100 INJECTION SUBCUTANEOUS at 18:22

## 2017-03-18 RX ADMIN — AMPICILLIN SODIUM AND SULBACTAM SODIUM 200 GRAM(S): 250; 125 INJECTION, POWDER, FOR SUSPENSION INTRAMUSCULAR; INTRAVENOUS at 06:16

## 2017-03-18 RX ADMIN — Medication 300 MILLIGRAM(S): at 18:23

## 2017-03-18 RX ADMIN — FOSAPREPITANT DIMEGLUMINE 450 MILLIGRAM(S): 150 INJECTION, POWDER, LYOPHILIZED, FOR SOLUTION INTRAVENOUS at 11:47

## 2017-03-18 RX ADMIN — AMPICILLIN SODIUM AND SULBACTAM SODIUM 200 GRAM(S): 250; 125 INJECTION, POWDER, FOR SUSPENSION INTRAMUSCULAR; INTRAVENOUS at 14:45

## 2017-03-18 RX ADMIN — Medication 50 MILLIGRAM(S): at 11:47

## 2017-03-18 RX ADMIN — Medication 20 MILLIGRAM(S): at 18:23

## 2017-03-18 RX ADMIN — Medication 25 MILLIGRAM(S): at 18:23

## 2017-03-18 RX ADMIN — SENNA PLUS 2 TABLET(S): 8.6 TABLET ORAL at 21:26

## 2017-03-18 RX ADMIN — Medication 650 MILLIGRAM(S): at 11:49

## 2017-03-18 RX ADMIN — Medication 25 MILLIGRAM(S): at 06:16

## 2017-03-18 RX ADMIN — Medication 100 MILLIGRAM(S): at 06:16

## 2017-03-18 NOTE — ADVANCED PRACTICE NURSE CONSULT - ASSESSMENT
Cycle 1, day 1/1,Height and weight verified. Lab results as per dr kaci garces aware of same. Vital signs stable prior to chemotherapy, and  within accepectable parameters, see sunrise. Pt educated on the importance of saving urine, verbalizes good understanding. Pt education done re chemo regimen, drug effects and potential side effects, written materials provided, pt states understanding. Reports that he has  tolerated chemotherapy with some nausea post d/c last cycle Pt withR powerport  line, site free from signs and symptoms of infection, good blood return obtained. Pre- medicated with emend 150 mg iv tylenol 650 mg po benadryl 50 mg po decadron 8 mg iv via rapidly infusing ns teodoro adriamycin 25 mg/m2=52 mg ivp administered positive blood return maintained throughout vinblastine 6 mg/m2=12 mg ivp via rapidly infusing ns line positive blood return maintained throughout dacarbazine 375 mg/s1=872 mg infused over 90 min positive blood return maintained

## 2017-03-19 LAB
CULTURE RESULTS: SIGNIFICANT CHANGE UP
CULTURE RESULTS: SIGNIFICANT CHANGE UP
HCT VFR BLD CALC: 37 % — LOW (ref 39–50)
HGB BLD-MCNC: 12.1 G/DL — LOW (ref 13–17)
MCHC RBC-ENTMCNC: 24.9 PG — LOW (ref 27–34)
MCHC RBC-ENTMCNC: 32.7 GM/DL — SIGNIFICANT CHANGE UP (ref 32–36)
MCV RBC AUTO: 76.3 FL — LOW (ref 80–100)
PLATELET # BLD AUTO: 460 K/UL — HIGH (ref 150–400)
RBC # BLD: 4.85 M/UL — SIGNIFICANT CHANGE UP (ref 4.2–5.8)
RBC # FLD: 16.4 % — HIGH (ref 10.3–14.5)
SPECIMEN SOURCE: SIGNIFICANT CHANGE UP
SPECIMEN SOURCE: SIGNIFICANT CHANGE UP
WBC # BLD: 5 K/UL — SIGNIFICANT CHANGE UP (ref 3.8–10.5)
WBC # FLD AUTO: 5 K/UL — SIGNIFICANT CHANGE UP (ref 3.8–10.5)

## 2017-03-19 PROCEDURE — 99232 SBSQ HOSP IP/OBS MODERATE 35: CPT

## 2017-03-19 RX ADMIN — Medication 300 MILLIGRAM(S): at 12:00

## 2017-03-19 RX ADMIN — Medication 20 MILLIGRAM(S): at 18:53

## 2017-03-19 RX ADMIN — ENOXAPARIN SODIUM 40 MILLIGRAM(S): 100 INJECTION SUBCUTANEOUS at 12:00

## 2017-03-19 RX ADMIN — Medication 8 MILLIGRAM(S): at 06:16

## 2017-03-19 RX ADMIN — AMPICILLIN SODIUM AND SULBACTAM SODIUM 200 GRAM(S): 250; 125 INJECTION, POWDER, FOR SUSPENSION INTRAMUSCULAR; INTRAVENOUS at 06:17

## 2017-03-19 RX ADMIN — Medication 20 MILLIGRAM(S): at 06:16

## 2017-03-19 RX ADMIN — Medication 25 MILLIGRAM(S): at 06:16

## 2017-03-19 RX ADMIN — ONDANSETRON 8 MILLIGRAM(S): 8 TABLET, FILM COATED ORAL at 19:13

## 2017-03-19 RX ADMIN — AMPICILLIN SODIUM AND SULBACTAM SODIUM 200 GRAM(S): 250; 125 INJECTION, POWDER, FOR SUSPENSION INTRAMUSCULAR; INTRAVENOUS at 23:24

## 2017-03-19 RX ADMIN — AMPICILLIN SODIUM AND SULBACTAM SODIUM 200 GRAM(S): 250; 125 INJECTION, POWDER, FOR SUSPENSION INTRAMUSCULAR; INTRAVENOUS at 12:00

## 2017-03-19 RX ADMIN — Medication 25 MILLIGRAM(S): at 18:53

## 2017-03-19 RX ADMIN — AMPICILLIN SODIUM AND SULBACTAM SODIUM 200 GRAM(S): 250; 125 INJECTION, POWDER, FOR SUSPENSION INTRAMUSCULAR; INTRAVENOUS at 18:53

## 2017-03-20 LAB
HCT VFR BLD CALC: 36.5 % — LOW (ref 39–50)
HGB BLD-MCNC: 11.8 G/DL — LOW (ref 13–17)
MCHC RBC-ENTMCNC: 24.8 PG — LOW (ref 27–34)
MCHC RBC-ENTMCNC: 32.3 GM/DL — SIGNIFICANT CHANGE UP (ref 32–36)
MCV RBC AUTO: 76.8 FL — LOW (ref 80–100)
PLATELET # BLD AUTO: 397 K/UL — SIGNIFICANT CHANGE UP (ref 150–400)
RBC # BLD: 4.75 M/UL — SIGNIFICANT CHANGE UP (ref 4.2–5.8)
RBC # FLD: 16.7 % — HIGH (ref 10.3–14.5)
WBC # BLD: 8.27 K/UL — SIGNIFICANT CHANGE UP (ref 3.8–10.5)
WBC # FLD AUTO: 8.27 K/UL — SIGNIFICANT CHANGE UP (ref 3.8–10.5)

## 2017-03-20 PROCEDURE — 99232 SBSQ HOSP IP/OBS MODERATE 35: CPT

## 2017-03-20 PROCEDURE — 99233 SBSQ HOSP IP/OBS HIGH 50: CPT

## 2017-03-20 PROCEDURE — 99231 SBSQ HOSP IP/OBS SF/LOW 25: CPT

## 2017-03-20 PROCEDURE — 71010: CPT | Mod: 26

## 2017-03-20 RX ADMIN — Medication 8 MILLIGRAM(S): at 12:02

## 2017-03-20 RX ADMIN — AMPICILLIN SODIUM AND SULBACTAM SODIUM 200 GRAM(S): 250; 125 INJECTION, POWDER, FOR SUSPENSION INTRAMUSCULAR; INTRAVENOUS at 17:00

## 2017-03-20 RX ADMIN — Medication 20 MILLIGRAM(S): at 05:25

## 2017-03-20 RX ADMIN — AMPICILLIN SODIUM AND SULBACTAM SODIUM 200 GRAM(S): 250; 125 INJECTION, POWDER, FOR SUSPENSION INTRAMUSCULAR; INTRAVENOUS at 23:04

## 2017-03-20 RX ADMIN — Medication 25 MILLIGRAM(S): at 23:04

## 2017-03-20 RX ADMIN — Medication 300 MILLIGRAM(S): at 12:02

## 2017-03-20 RX ADMIN — Medication 20 MILLIGRAM(S): at 18:27

## 2017-03-20 RX ADMIN — ENOXAPARIN SODIUM 40 MILLIGRAM(S): 100 INJECTION SUBCUTANEOUS at 12:01

## 2017-03-20 RX ADMIN — AMPICILLIN SODIUM AND SULBACTAM SODIUM 200 GRAM(S): 250; 125 INJECTION, POWDER, FOR SUSPENSION INTRAMUSCULAR; INTRAVENOUS at 05:25

## 2017-03-20 RX ADMIN — Medication 25 MILLIGRAM(S): at 12:01

## 2017-03-20 RX ADMIN — AMPICILLIN SODIUM AND SULBACTAM SODIUM 200 GRAM(S): 250; 125 INJECTION, POWDER, FOR SUSPENSION INTRAMUSCULAR; INTRAVENOUS at 11:00

## 2017-03-21 PROCEDURE — 71010: CPT | Mod: 26,77

## 2017-03-21 PROCEDURE — 99232 SBSQ HOSP IP/OBS MODERATE 35: CPT | Mod: GC

## 2017-03-21 PROCEDURE — 99232 SBSQ HOSP IP/OBS MODERATE 35: CPT

## 2017-03-21 PROCEDURE — 99233 SBSQ HOSP IP/OBS HIGH 50: CPT

## 2017-03-21 PROCEDURE — 71010: CPT | Mod: 26

## 2017-03-21 RX ADMIN — AMPICILLIN SODIUM AND SULBACTAM SODIUM 200 GRAM(S): 250; 125 INJECTION, POWDER, FOR SUSPENSION INTRAMUSCULAR; INTRAVENOUS at 09:41

## 2017-03-21 RX ADMIN — Medication 300 MILLIGRAM(S): at 17:14

## 2017-03-21 RX ADMIN — Medication 1 APPLICATION(S): at 23:25

## 2017-03-21 RX ADMIN — AMPICILLIN SODIUM AND SULBACTAM SODIUM 200 GRAM(S): 250; 125 INJECTION, POWDER, FOR SUSPENSION INTRAMUSCULAR; INTRAVENOUS at 23:25

## 2017-03-21 RX ADMIN — AMPICILLIN SODIUM AND SULBACTAM SODIUM 200 GRAM(S): 250; 125 INJECTION, POWDER, FOR SUSPENSION INTRAMUSCULAR; INTRAVENOUS at 05:02

## 2017-03-21 RX ADMIN — Medication 25 MILLIGRAM(S): at 05:02

## 2017-03-21 RX ADMIN — AMPICILLIN SODIUM AND SULBACTAM SODIUM 200 GRAM(S): 250; 125 INJECTION, POWDER, FOR SUSPENSION INTRAMUSCULAR; INTRAVENOUS at 17:00

## 2017-03-21 RX ADMIN — Medication 20 MILLIGRAM(S): at 05:02

## 2017-03-21 RX ADMIN — ENOXAPARIN SODIUM 40 MILLIGRAM(S): 100 INJECTION SUBCUTANEOUS at 17:14

## 2017-03-21 RX ADMIN — Medication 20 MILLIGRAM(S): at 17:14

## 2017-03-21 RX ADMIN — Medication 25 MILLIGRAM(S): at 17:14

## 2017-03-21 NOTE — PROVIDER CONTACT NOTE (OTHER) - ASSESSMENT
vital signs stable. No signs of respiratory distress. 100% on room air and 18 respirations  right diminished breath sounds and right chest tube site intact with no subcutaneous emphysema present.

## 2017-03-21 NOTE — PROVIDER CONTACT NOTE (OTHER) - ASSESSMENT
vital signs stable. No signs of respiratory distress. 100% on room air and 20 respirations  right diminished breath sounds and right chest tube site intact with no subcutaneous emphysema present.

## 2017-03-21 NOTE — PROVIDER CONTACT NOTE (OTHER) - ASSESSMENT
Patient has routine CBC and BMP ordered; patient is requesting for it to be drawn at 9 am instead of 6 am

## 2017-03-22 LAB
ANION GAP SERPL CALC-SCNC: 14 MMOL/L — SIGNIFICANT CHANGE UP (ref 5–17)
BASOPHILS # BLD AUTO: 0.01 K/UL — SIGNIFICANT CHANGE UP (ref 0–0.2)
BASOPHILS NFR BLD AUTO: 0.1 % — SIGNIFICANT CHANGE UP (ref 0–2)
BUN SERPL-MCNC: 21 MG/DL — SIGNIFICANT CHANGE UP (ref 7–23)
CALCIUM SERPL-MCNC: 8.5 MG/DL — SIGNIFICANT CHANGE UP (ref 8.4–10.5)
CHLORIDE SERPL-SCNC: 97 MMOL/L — SIGNIFICANT CHANGE UP (ref 96–108)
CO2 SERPL-SCNC: 25 MMOL/L — SIGNIFICANT CHANGE UP (ref 22–31)
CREAT SERPL-MCNC: 0.65 MG/DL — SIGNIFICANT CHANGE UP (ref 0.5–1.3)
CULTURE RESULTS: SIGNIFICANT CHANGE UP
EOSINOPHIL # BLD AUTO: 0.01 K/UL — SIGNIFICANT CHANGE UP (ref 0–0.5)
EOSINOPHIL NFR BLD AUTO: 0.1 % — SIGNIFICANT CHANGE UP (ref 0–6)
GLUCOSE SERPL-MCNC: 112 MG/DL — HIGH (ref 70–99)
HCT VFR BLD CALC: 45.4 % — SIGNIFICANT CHANGE UP (ref 39–50)
HGB BLD-MCNC: 14.8 G/DL — SIGNIFICANT CHANGE UP (ref 13–17)
IMM GRANULOCYTES NFR BLD AUTO: 0.2 % — SIGNIFICANT CHANGE UP (ref 0–1.5)
INR BLD: 1.1 RATIO — SIGNIFICANT CHANGE UP (ref 0.88–1.16)
LYMPHOCYTES # BLD AUTO: 0.82 K/UL — LOW (ref 1–3.3)
LYMPHOCYTES # BLD AUTO: 6.7 % — LOW (ref 13–44)
MCHC RBC-ENTMCNC: 25.3 PG — LOW (ref 27–34)
MCHC RBC-ENTMCNC: 32.6 GM/DL — SIGNIFICANT CHANGE UP (ref 32–36)
MCV RBC AUTO: 77.6 FL — LOW (ref 80–100)
MONOCYTES # BLD AUTO: 0.6 K/UL — SIGNIFICANT CHANGE UP (ref 0–0.9)
MONOCYTES NFR BLD AUTO: 4.9 % — SIGNIFICANT CHANGE UP (ref 2–14)
NEUTROPHILS # BLD AUTO: 10.79 K/UL — HIGH (ref 1.8–7.4)
NEUTROPHILS NFR BLD AUTO: 88 % — HIGH (ref 43–77)
PLATELET # BLD AUTO: 475 K/UL — HIGH (ref 150–400)
POTASSIUM SERPL-MCNC: 3.9 MMOL/L — SIGNIFICANT CHANGE UP (ref 3.5–5.3)
POTASSIUM SERPL-SCNC: 3.9 MMOL/L — SIGNIFICANT CHANGE UP (ref 3.5–5.3)
PROTHROM AB SERPL-ACNC: 12.5 SEC — SIGNIFICANT CHANGE UP (ref 10–13.1)
RBC # BLD: 5.85 M/UL — HIGH (ref 4.2–5.8)
RBC # FLD: 17.2 % — HIGH (ref 10.3–14.5)
SODIUM SERPL-SCNC: 136 MMOL/L — SIGNIFICANT CHANGE UP (ref 135–145)
SPECIMEN SOURCE: SIGNIFICANT CHANGE UP
WBC # BLD: 12.26 K/UL — HIGH (ref 3.8–10.5)
WBC # FLD AUTO: 12.26 K/UL — HIGH (ref 3.8–10.5)

## 2017-03-22 PROCEDURE — 99233 SBSQ HOSP IP/OBS HIGH 50: CPT

## 2017-03-22 PROCEDURE — 99232 SBSQ HOSP IP/OBS MODERATE 35: CPT

## 2017-03-22 PROCEDURE — 71010: CPT | Mod: 26

## 2017-03-22 RX ORDER — HYDROMORPHONE HYDROCHLORIDE 2 MG/ML
1 INJECTION INTRAMUSCULAR; INTRAVENOUS; SUBCUTANEOUS ONCE
Qty: 0 | Refills: 0 | Status: DISCONTINUED | OUTPATIENT
Start: 2017-03-22 | End: 2017-03-22

## 2017-03-22 RX ADMIN — HYDROMORPHONE HYDROCHLORIDE 1 MILLIGRAM(S): 2 INJECTION INTRAMUSCULAR; INTRAVENOUS; SUBCUTANEOUS at 03:30

## 2017-03-22 RX ADMIN — Medication 20 MILLIGRAM(S): at 06:13

## 2017-03-22 RX ADMIN — ENOXAPARIN SODIUM 40 MILLIGRAM(S): 100 INJECTION SUBCUTANEOUS at 13:48

## 2017-03-22 RX ADMIN — AMPICILLIN SODIUM AND SULBACTAM SODIUM 200 GRAM(S): 250; 125 INJECTION, POWDER, FOR SUSPENSION INTRAMUSCULAR; INTRAVENOUS at 22:08

## 2017-03-22 RX ADMIN — HYDROMORPHONE HYDROCHLORIDE 1 MILLIGRAM(S): 2 INJECTION INTRAMUSCULAR; INTRAVENOUS; SUBCUTANEOUS at 04:27

## 2017-03-22 RX ADMIN — HYDROMORPHONE HYDROCHLORIDE 1 MILLIGRAM(S): 2 INJECTION INTRAMUSCULAR; INTRAVENOUS; SUBCUTANEOUS at 07:37

## 2017-03-22 RX ADMIN — Medication 25 MILLIGRAM(S): at 06:13

## 2017-03-22 RX ADMIN — HYDROMORPHONE HYDROCHLORIDE 1 MILLIGRAM(S): 2 INJECTION INTRAMUSCULAR; INTRAVENOUS; SUBCUTANEOUS at 08:06

## 2017-03-22 RX ADMIN — Medication 650 MILLIGRAM(S): at 02:21

## 2017-03-22 RX ADMIN — Medication 100 MILLIGRAM(S): at 06:15

## 2017-03-22 RX ADMIN — AMPICILLIN SODIUM AND SULBACTAM SODIUM 200 GRAM(S): 250; 125 INJECTION, POWDER, FOR SUSPENSION INTRAMUSCULAR; INTRAVENOUS at 16:03

## 2017-03-22 RX ADMIN — Medication 300 MILLIGRAM(S): at 11:54

## 2017-03-22 RX ADMIN — Medication 650 MILLIGRAM(S): at 03:21

## 2017-03-22 RX ADMIN — Medication 20 MILLIGRAM(S): at 17:16

## 2017-03-22 RX ADMIN — AMPICILLIN SODIUM AND SULBACTAM SODIUM 200 GRAM(S): 250; 125 INJECTION, POWDER, FOR SUSPENSION INTRAMUSCULAR; INTRAVENOUS at 06:13

## 2017-03-22 RX ADMIN — Medication 25 MILLIGRAM(S): at 17:17

## 2017-03-22 RX ADMIN — AMPICILLIN SODIUM AND SULBACTAM SODIUM 200 GRAM(S): 250; 125 INJECTION, POWDER, FOR SUSPENSION INTRAMUSCULAR; INTRAVENOUS at 11:53

## 2017-03-22 RX ADMIN — Medication 1 APPLICATION(S): at 06:13

## 2017-03-22 NOTE — PROVIDER CONTACT NOTE (OTHER) - RECOMMENDATIONS
NP notified. CT surgery made aware.
PA made aware; awaiting orders.
STAT CXR
MD and PA made aware; awaiting orders.
NP notified.
Notify NP
PA notified
PA notified. Recommended to PA to see pt and assess site/tube
Will continue to monitor.

## 2017-03-22 NOTE — PROVIDER CONTACT NOTE (OTHER) - ASSESSMENT
vital signs stable. No signs of respiratory distress. 96% on room air and 16 respirations  right diminished breath sounds and right chest tube site intact with no subcutaneous emphysema present; pink tinged fluid noted; A&Ox4, neurologically intact; c/o 10/10 pain @incision site.

## 2017-03-22 NOTE — PROVIDER CONTACT NOTE (OTHER) - ASSESSMENT
vital signs stable. No signs of respiratory distress. 94% on room air and 16 respirations  right diminished breath sounds and right chest tube site intact with no subcutaneous emphysema present; sero-sang tinged fluid noted; A&Ox4, neurologically intact; c/o 10/10 pain @incision site.

## 2017-03-22 NOTE — PROVIDER CONTACT NOTE (OTHER) - ASSESSMENT
vital signs stable. No signs of respiratory distress. 96% on room air and 16 respirations  right diminished breath sounds and right chest tube site intact with no subcutaneous emphysema present; sero-sang tinged fluid noted; A&Ox4, neurologically intact; c/o 10/10 pain @incision site.

## 2017-03-23 ENCOUNTER — RESULT REVIEW (OUTPATIENT)
Age: 37
End: 2017-03-23

## 2017-03-23 LAB
CULTURE RESULTS: SIGNIFICANT CHANGE UP
SPECIMEN SOURCE: SIGNIFICANT CHANGE UP

## 2017-03-23 PROCEDURE — 99232 SBSQ HOSP IP/OBS MODERATE 35: CPT

## 2017-03-23 PROCEDURE — 99233 SBSQ HOSP IP/OBS HIGH 50: CPT

## 2017-03-23 RX ADMIN — Medication 20 MILLIGRAM(S): at 18:13

## 2017-03-23 RX ADMIN — ENOXAPARIN SODIUM 40 MILLIGRAM(S): 100 INJECTION SUBCUTANEOUS at 15:07

## 2017-03-23 RX ADMIN — Medication 25 MILLIGRAM(S): at 05:21

## 2017-03-23 RX ADMIN — Medication 20 MILLIGRAM(S): at 05:21

## 2017-03-23 RX ADMIN — AMPICILLIN SODIUM AND SULBACTAM SODIUM 200 GRAM(S): 250; 125 INJECTION, POWDER, FOR SUSPENSION INTRAMUSCULAR; INTRAVENOUS at 05:21

## 2017-03-23 RX ADMIN — Medication 300 MILLIGRAM(S): at 11:00

## 2017-03-23 RX ADMIN — AMPICILLIN SODIUM AND SULBACTAM SODIUM 200 GRAM(S): 250; 125 INJECTION, POWDER, FOR SUSPENSION INTRAMUSCULAR; INTRAVENOUS at 09:14

## 2017-03-23 RX ADMIN — AMPICILLIN SODIUM AND SULBACTAM SODIUM 200 GRAM(S): 250; 125 INJECTION, POWDER, FOR SUSPENSION INTRAMUSCULAR; INTRAVENOUS at 21:18

## 2017-03-23 RX ADMIN — Medication 25 MILLIGRAM(S): at 18:13

## 2017-03-23 RX ADMIN — AMPICILLIN SODIUM AND SULBACTAM SODIUM 200 GRAM(S): 250; 125 INJECTION, POWDER, FOR SUSPENSION INTRAMUSCULAR; INTRAVENOUS at 15:07

## 2017-03-24 ENCOUNTER — APPOINTMENT (OUTPATIENT)
Dept: THORACIC SURGERY | Facility: HOSPITAL | Age: 37
End: 2017-03-24

## 2017-03-24 LAB
ANION GAP SERPL CALC-SCNC: 11 MMOL/L — SIGNIFICANT CHANGE UP (ref 5–17)
ANION GAP SERPL CALC-SCNC: 9 MMOL/L — SIGNIFICANT CHANGE UP (ref 5–17)
BUN SERPL-MCNC: 14 MG/DL — SIGNIFICANT CHANGE UP (ref 7–23)
BUN SERPL-MCNC: 16 MG/DL — SIGNIFICANT CHANGE UP (ref 7–23)
CALCIUM SERPL-MCNC: 8.1 MG/DL — LOW (ref 8.4–10.5)
CALCIUM SERPL-MCNC: 8.4 MG/DL — SIGNIFICANT CHANGE UP (ref 8.4–10.5)
CHLORIDE SERPL-SCNC: 101 MMOL/L — SIGNIFICANT CHANGE UP (ref 96–108)
CHLORIDE SERPL-SCNC: 101 MMOL/L — SIGNIFICANT CHANGE UP (ref 96–108)
CO2 SERPL-SCNC: 26 MMOL/L — SIGNIFICANT CHANGE UP (ref 22–31)
CO2 SERPL-SCNC: 28 MMOL/L — SIGNIFICANT CHANGE UP (ref 22–31)
CREAT SERPL-MCNC: 0.52 MG/DL — SIGNIFICANT CHANGE UP (ref 0.5–1.3)
CREAT SERPL-MCNC: 0.55 MG/DL — SIGNIFICANT CHANGE UP (ref 0.5–1.3)
GAS PNL BLDA: SIGNIFICANT CHANGE UP
GLUCOSE SERPL-MCNC: 133 MG/DL — HIGH (ref 70–99)
GLUCOSE SERPL-MCNC: 87 MG/DL — SIGNIFICANT CHANGE UP (ref 70–99)
GRAM STN FLD: SIGNIFICANT CHANGE UP
GRAM STN FLD: SIGNIFICANT CHANGE UP
HCT VFR BLD CALC: 34.4 % — LOW (ref 39–50)
HCT VFR BLD CALC: 37.8 % — LOW (ref 39–50)
HGB BLD-MCNC: 10.8 G/DL — LOW (ref 13–17)
HGB BLD-MCNC: 12.3 G/DL — LOW (ref 13–17)
MCHC RBC-ENTMCNC: 24.4 PG — LOW (ref 27–34)
MCHC RBC-ENTMCNC: 26 PG — LOW (ref 27–34)
MCHC RBC-ENTMCNC: 31.4 GM/DL — LOW (ref 32–36)
MCHC RBC-ENTMCNC: 32.6 GM/DL — SIGNIFICANT CHANGE UP (ref 32–36)
MCV RBC AUTO: 77.8 FL — LOW (ref 80–100)
MCV RBC AUTO: 79.8 FL — LOW (ref 80–100)
NIGHT BLUE STAIN TISS: SIGNIFICANT CHANGE UP
NIGHT BLUE STAIN TISS: SIGNIFICANT CHANGE UP
PLATELET # BLD AUTO: 441 K/UL — HIGH (ref 150–400)
PLATELET # BLD AUTO: 460 K/UL — HIGH (ref 150–400)
POTASSIUM SERPL-MCNC: 3.7 MMOL/L — SIGNIFICANT CHANGE UP (ref 3.5–5.3)
POTASSIUM SERPL-MCNC: 3.9 MMOL/L — SIGNIFICANT CHANGE UP (ref 3.5–5.3)
POTASSIUM SERPL-SCNC: 3.7 MMOL/L — SIGNIFICANT CHANGE UP (ref 3.5–5.3)
POTASSIUM SERPL-SCNC: 3.9 MMOL/L — SIGNIFICANT CHANGE UP (ref 3.5–5.3)
RBC # BLD: 4.42 M/UL — SIGNIFICANT CHANGE UP (ref 4.2–5.8)
RBC # BLD: 4.74 M/UL — SIGNIFICANT CHANGE UP (ref 4.2–5.8)
RBC # FLD: 16.1 % — HIGH (ref 10.3–14.5)
RBC # FLD: 17 % — HIGH (ref 10.3–14.5)
SODIUM SERPL-SCNC: 138 MMOL/L — SIGNIFICANT CHANGE UP (ref 135–145)
SODIUM SERPL-SCNC: 138 MMOL/L — SIGNIFICANT CHANGE UP (ref 135–145)
SPECIMEN SOURCE: SIGNIFICANT CHANGE UP
WBC # BLD: 13.4 K/UL — HIGH (ref 3.8–10.5)
WBC # BLD: 7.6 K/UL — SIGNIFICANT CHANGE UP (ref 3.8–10.5)
WBC # FLD AUTO: 13.4 K/UL — HIGH (ref 3.8–10.5)
WBC # FLD AUTO: 7.6 K/UL — SIGNIFICANT CHANGE UP (ref 3.8–10.5)

## 2017-03-24 PROCEDURE — 88312 SPECIAL STAINS GROUP 1: CPT | Mod: 26

## 2017-03-24 PROCEDURE — 71010: CPT | Mod: 26

## 2017-03-24 PROCEDURE — 88305 TISSUE EXAM BY PATHOLOGIST: CPT | Mod: 26

## 2017-03-24 PROCEDURE — 99232 SBSQ HOSP IP/OBS MODERATE 35: CPT

## 2017-03-24 PROCEDURE — 88331 PATH CONSLTJ SURG 1 BLK 1SPC: CPT | Mod: 26

## 2017-03-24 RX ORDER — HYDROMORPHONE HYDROCHLORIDE 2 MG/ML
30 INJECTION INTRAMUSCULAR; INTRAVENOUS; SUBCUTANEOUS
Qty: 0 | Refills: 0 | Status: DISCONTINUED | OUTPATIENT
Start: 2017-03-24 | End: 2017-03-31

## 2017-03-24 RX ORDER — HYDROMORPHONE HYDROCHLORIDE 2 MG/ML
1 INJECTION INTRAMUSCULAR; INTRAVENOUS; SUBCUTANEOUS
Qty: 0 | Refills: 0 | Status: DISCONTINUED | OUTPATIENT
Start: 2017-03-24 | End: 2017-03-24

## 2017-03-24 RX ORDER — FUROSEMIDE 40 MG
20 TABLET ORAL EVERY 12 HOURS
Qty: 0 | Refills: 0 | Status: DISCONTINUED | OUTPATIENT
Start: 2017-03-24 | End: 2017-04-04

## 2017-03-24 RX ORDER — AMPICILLIN SODIUM AND SULBACTAM SODIUM 250; 125 MG/ML; MG/ML
INJECTION, POWDER, FOR SUSPENSION INTRAMUSCULAR; INTRAVENOUS
Qty: 0 | Refills: 0 | Status: DISCONTINUED | OUTPATIENT
Start: 2017-03-24 | End: 2017-04-04

## 2017-03-24 RX ORDER — AMPICILLIN SODIUM AND SULBACTAM SODIUM 250; 125 MG/ML; MG/ML
3 INJECTION, POWDER, FOR SUSPENSION INTRAMUSCULAR; INTRAVENOUS EVERY 6 HOURS
Qty: 0 | Refills: 0 | Status: DISCONTINUED | OUTPATIENT
Start: 2017-03-25 | End: 2017-04-04

## 2017-03-24 RX ORDER — ACETAMINOPHEN 500 MG
1000 TABLET ORAL ONCE
Qty: 0 | Refills: 0 | Status: COMPLETED | OUTPATIENT
Start: 2017-03-24 | End: 2017-03-25

## 2017-03-24 RX ORDER — HYDROMORPHONE HYDROCHLORIDE 2 MG/ML
0.5 INJECTION INTRAMUSCULAR; INTRAVENOUS; SUBCUTANEOUS
Qty: 0 | Refills: 0 | Status: DISCONTINUED | OUTPATIENT
Start: 2017-03-24 | End: 2017-03-31

## 2017-03-24 RX ORDER — ENOXAPARIN SODIUM 100 MG/ML
40 INJECTION SUBCUTANEOUS DAILY
Qty: 0 | Refills: 0 | Status: DISCONTINUED | OUTPATIENT
Start: 2017-03-25 | End: 2017-04-04

## 2017-03-24 RX ORDER — ACETAMINOPHEN 500 MG
1000 TABLET ORAL ONCE
Qty: 0 | Refills: 0 | Status: COMPLETED | OUTPATIENT
Start: 2017-03-25 | End: 2017-03-25

## 2017-03-24 RX ORDER — ONDANSETRON 8 MG/1
4 TABLET, FILM COATED ORAL ONCE
Qty: 0 | Refills: 0 | Status: DISCONTINUED | OUTPATIENT
Start: 2017-03-24 | End: 2017-03-24

## 2017-03-24 RX ORDER — SODIUM CHLORIDE 9 MG/ML
500 INJECTION, SOLUTION INTRAVENOUS
Qty: 0 | Refills: 0 | Status: DISCONTINUED | OUTPATIENT
Start: 2017-03-24 | End: 2017-03-25

## 2017-03-24 RX ORDER — AMPICILLIN SODIUM AND SULBACTAM SODIUM 250; 125 MG/ML; MG/ML
3 INJECTION, POWDER, FOR SUSPENSION INTRAMUSCULAR; INTRAVENOUS ONCE
Qty: 0 | Refills: 0 | Status: COMPLETED | OUTPATIENT
Start: 2017-03-24 | End: 2017-03-24

## 2017-03-24 RX ORDER — METOPROLOL TARTRATE 50 MG
25 TABLET ORAL
Qty: 0 | Refills: 0 | Status: DISCONTINUED | OUTPATIENT
Start: 2017-03-24 | End: 2017-04-04

## 2017-03-24 RX ADMIN — Medication 25 MILLIGRAM(S): at 18:48

## 2017-03-24 RX ADMIN — Medication 25 MILLIGRAM(S): at 07:00

## 2017-03-24 RX ADMIN — HYDROMORPHONE HYDROCHLORIDE 1 MILLIGRAM(S): 2 INJECTION INTRAMUSCULAR; INTRAVENOUS; SUBCUTANEOUS at 15:06

## 2017-03-24 RX ADMIN — HYDROMORPHONE HYDROCHLORIDE 30 MILLILITER(S): 2 INJECTION INTRAMUSCULAR; INTRAVENOUS; SUBCUTANEOUS at 15:34

## 2017-03-24 RX ADMIN — HYDROMORPHONE HYDROCHLORIDE 1 MILLIGRAM(S): 2 INJECTION INTRAMUSCULAR; INTRAVENOUS; SUBCUTANEOUS at 15:20

## 2017-03-24 RX ADMIN — AMPICILLIN SODIUM AND SULBACTAM SODIUM 200 GRAM(S): 250; 125 INJECTION, POWDER, FOR SUSPENSION INTRAMUSCULAR; INTRAVENOUS at 04:44

## 2017-03-24 RX ADMIN — AMPICILLIN SODIUM AND SULBACTAM SODIUM 200 GRAM(S): 250; 125 INJECTION, POWDER, FOR SUSPENSION INTRAMUSCULAR; INTRAVENOUS at 23:49

## 2017-03-24 RX ADMIN — SODIUM CHLORIDE 50 MILLILITER(S): 9 INJECTION, SOLUTION INTRAVENOUS at 15:38

## 2017-03-24 RX ADMIN — Medication 20 MILLIGRAM(S): at 18:42

## 2017-03-24 NOTE — PROVIDER CONTACT NOTE (OTHER) - DATE AND TIME:
10-Mar-2017 23:00
11-Mar-2017 07:50
12-Mar-2017 06:50
21-Mar-2017 06:15
21-Mar-2017 17:55
21-Mar-2017 19:50
22-Mar-2017 03:00
22-Mar-2017 07:00
22-Mar-2017 22:00
11-Mar-2017 21:50
10-Mar-2017
23-Mar-2017 23:10
24-Mar-2017 06:58

## 2017-03-24 NOTE — PROVIDER CONTACT NOTE (OTHER) - BACKGROUND
Pt admitted for non-hodgkin's lymphoma. Pt has right posterior chest chest tube. Pt admitted for non-hodgkin's lymphoma. Pt has right posterior chest tube.

## 2017-03-24 NOTE — PROVIDER CONTACT NOTE (OTHER) - ASSESSMENT
Pt is A&Ox4. Pt denies chest pain, dizzienss, and SOB. Pt is A&Ox4. Pt denies chest pain, dizziness, and SOB. Right posterior chest chest tube connected to water seal as per provider to RN order states. Pt is A&Ox4. Pt denies chest pain, dizziness, and SOB. Right posterior chest tube connected to water seal as per provider to RN order states.

## 2017-03-24 NOTE — PROVIDER CONTACT NOTE (OTHER) - SITUATION
Chest tube fluid color change to sero sang; Output from 7p to 3a 150ml total to gravity; pt c/o severe pt @incisional site.
Patient is requesting 6 am labs to be drawn at 9 am
Patient refusing to let pleurex catheter drain.
Pt c/o of room spinning/dizziness. Pt diaphoretic.
Pt complained of feeling right lung pressure s/p 300ml stat output after chest tube was unclamped for 1 hr.
Pt with 6ml output via Chest tube on Right back/flank
Pt with output of 3-5mL from chest tube to low wall suction in last 3 hours
Pts sided chest tube is draining serosanguineous drainage w/ clots. Pt refusing chest tube to low wall suction. Provider to RN states okay to keep chest tube to water seal if pt refused LWS.
pt c/o severe pain on R posterior chest wall due to cath flow drainage @1700 pain scale 10/10. Total drainage 1180 ml serous fluid from 7am.
pt refusing LWS w/ R chest tube; Total drainage till 7p: 1180 ml; pink tinged fluid noted; 90ml drained by gravity from 7p-10pm; pt c/o persistent pain 10/10 at incisional site.
pt to receive TPA and pulmozyme Intrapleural Q12hrs; requires specialist to perform.
Pt refusing lasix 20mg IVP as ordered. Pt educated on importance of medication adherence, pt taught back. Pt still refusing. Pts chest tube drained 20ml throughout shift.
Chest tube clamped at beginning of shift; no order to specify.

## 2017-03-24 NOTE — PROVIDER CONTACT NOTE (OTHER) - NAME OF MD/NP/PA/DO NOTIFIED:
MICHAEL Locke
Fernando Barcenas MD Thoracic; Jhonny Lai
Jhonny Lai
KIERA Saavedra
KIERA Saavedra
MICHAEL Locke
NP Moisés Esparza
Patti Ratliff MD Thoracic/ Jhonny Lai
Rachelle from pulmonology.
night NP Moisés Esparza and day NP Urszula
FEDERICO Blas [id number]; cardiacthoric team: Pamela Joseph & Gabriela Montgomery
Fernando Barcenas MD Thoracic; Selam CTU NP
Mary Lou Bustamante

## 2017-03-24 NOTE — PROVIDER CONTACT NOTE (OTHER) - REASON
Patient refusing to let pleurex catheter drain.
Pt refusing Lasix 20mg IVP as ordered, pts chest tube drained 20ml throughout shift.
Serosanguineous drainage with clots, pt refusing chest tube to low wall suction
Patient is requesting 6 am labs to be drawn at 9 am
Pt complained of feeling right chest tube pressure s/p 300ml stat output after chest tube was unclamped for 1 hr.
pt to receive TPA and pulmozyme Intrapleural Q12hrs; requires specialist to perform.
Chest tube fluid color change to sero sang; Output from 7p to 3a 150ml total to gravity; pt c/o severe pt @incisional site.
Pt c/o of room spinning/dizziness. Pt diaphoretic.
Pt with 6ml output via Chest tube on Right back/flank
Pt with output of 3-5mL from chest tube to low wall suction in last 3 hours
pt c/o severe pain on R posterior chest wall due to cath flow drainage @1700 pain scale 10/10.
pt refusing LWS w/ R chest tube; Total drainage till 7p: 1180 ml; pink tinged fluid noted; 90ml drained by gravity from 7p-10pm.
Chest tube clamped

## 2017-03-24 NOTE — PROVIDER CONTACT NOTE (OTHER) - ACTION/TREATMENT ORDERED:
As per Jhonny Lai will order 1 tab percocet for pain; f/u STAT CXR for SOB; as per result of CXR may continue LWS; continue to monitor pt.
As per pulmonology may leave drain clamped as per patient's wishes. If patient becomes symptomatic then drain chest tube 1L PRN and clamp in between each liter for 1 hour until repeat chest xray done
KIERA Esparza made aware. CT surgery made aware; as per CT surgery, keep CT clamped. Awaiting orders.
NP Betogh aware. No further interventions at this time, will continue to monitor.
NP Otugh aware. NP coming to assess pt. Will continue to monitor.
Pamela Joseph saw patient at the bedside. and low wall suction on hold until chest XR results. Night RN- David made aware,
; As per Jhonny Lai will order 1 mg Diluadid IVP for severe pain; as per Fernando Barcenas MD: will assess pt and chest tube; will order STAT portable chest xray, maintain gravity; continue to monitor
As per Fernando Barcenas MD Thoracic; Selam CTU NP will administer by CTU Day NP; continue to monitor pt.
As per Patti Ratliff MD Thoracic provider to RN if pt refuses LWS, can drain by gravity will order CXR in AM; continue to monitor; As per Jhonny Lai will order 2 percocet for pain @2300.
Mary Lou Merida I made aware of patient's request; labs will be drawn at 9 am
Will continue to monitor pt
Will see pt and assess tubing/site. Will continue to monitor pt.
f/u CXR to be completed during day as ordered. Will notify day medicine team regarding plan. Will continue to monitor.

## 2017-03-24 NOTE — BRIEF OPERATIVE NOTE - OPERATION/FINDINGS
Patient underwent double lumen intubation, confirmed by bronchoscopy. A griffin was placed. He was then put in left lateral decubitus position. His previously placed pigtail was removed from the right chest wall. He was prepped and draped in sterile fashion. We made 2 incisions in the right chest wall and performed extensive decortication and evacuation of fibrinous exudate. We sent some pleural peel for pathology. The lung was rexpanded under direct visualization. We placed 2 28F chest tubes, the lateral was straight and the medial angled. They were both sutured to the skin and connected to suction. The patient tolerated the procedure well and was extubated and brought to the PACU in stable condition.

## 2017-03-25 LAB
ANION GAP SERPL CALC-SCNC: 14 MMOL/L — SIGNIFICANT CHANGE UP (ref 5–17)
BUN SERPL-MCNC: 12 MG/DL — SIGNIFICANT CHANGE UP (ref 7–23)
CALCIUM SERPL-MCNC: 8.7 MG/DL — SIGNIFICANT CHANGE UP (ref 8.4–10.5)
CHLORIDE SERPL-SCNC: 98 MMOL/L — SIGNIFICANT CHANGE UP (ref 96–108)
CO2 SERPL-SCNC: 26 MMOL/L — SIGNIFICANT CHANGE UP (ref 22–31)
CREAT SERPL-MCNC: 0.53 MG/DL — SIGNIFICANT CHANGE UP (ref 0.5–1.3)
GLUCOSE SERPL-MCNC: 105 MG/DL — HIGH (ref 70–99)
HCT VFR BLD CALC: 36.7 % — LOW (ref 39–50)
HGB BLD-MCNC: 11.6 G/DL — LOW (ref 13–17)
MCHC RBC-ENTMCNC: 24.8 PG — LOW (ref 27–34)
MCHC RBC-ENTMCNC: 31.6 GM/DL — LOW (ref 32–36)
MCV RBC AUTO: 78.6 FL — LOW (ref 80–100)
PLATELET # BLD AUTO: 529 K/UL — HIGH (ref 150–400)
POTASSIUM SERPL-MCNC: 4.4 MMOL/L — SIGNIFICANT CHANGE UP (ref 3.5–5.3)
POTASSIUM SERPL-SCNC: 4.4 MMOL/L — SIGNIFICANT CHANGE UP (ref 3.5–5.3)
RBC # BLD: 4.67 M/UL — SIGNIFICANT CHANGE UP (ref 4.2–5.8)
RBC # FLD: 17.2 % — HIGH (ref 10.3–14.5)
SODIUM SERPL-SCNC: 138 MMOL/L — SIGNIFICANT CHANGE UP (ref 135–145)
WBC # BLD: 10 K/UL — SIGNIFICANT CHANGE UP (ref 3.8–10.5)
WBC # FLD AUTO: 10 K/UL — SIGNIFICANT CHANGE UP (ref 3.8–10.5)

## 2017-03-25 PROCEDURE — 99233 SBSQ HOSP IP/OBS HIGH 50: CPT

## 2017-03-25 PROCEDURE — 71010: CPT | Mod: 26

## 2017-03-25 RX ORDER — SODIUM CHLORIDE 9 MG/ML
1000 INJECTION INTRAMUSCULAR; INTRAVENOUS; SUBCUTANEOUS
Qty: 0 | Refills: 0 | Status: DISCONTINUED | OUTPATIENT
Start: 2017-03-25 | End: 2017-03-31

## 2017-03-25 RX ADMIN — Medication 1000 MILLIGRAM(S): at 07:03

## 2017-03-25 RX ADMIN — AMPICILLIN SODIUM AND SULBACTAM SODIUM 200 GRAM(S): 250; 125 INJECTION, POWDER, FOR SUSPENSION INTRAMUSCULAR; INTRAVENOUS at 06:29

## 2017-03-25 RX ADMIN — Medication 20 MILLIGRAM(S): at 06:01

## 2017-03-25 RX ADMIN — Medication 400 MILLIGRAM(S): at 06:03

## 2017-03-25 RX ADMIN — Medication 25 MILLIGRAM(S): at 06:01

## 2017-03-25 RX ADMIN — AMPICILLIN SODIUM AND SULBACTAM SODIUM 200 GRAM(S): 250; 125 INJECTION, POWDER, FOR SUSPENSION INTRAMUSCULAR; INTRAVENOUS at 13:14

## 2017-03-25 RX ADMIN — AMPICILLIN SODIUM AND SULBACTAM SODIUM 200 GRAM(S): 250; 125 INJECTION, POWDER, FOR SUSPENSION INTRAMUSCULAR; INTRAVENOUS at 18:26

## 2017-03-25 RX ADMIN — Medication 25 MILLIGRAM(S): at 18:26

## 2017-03-25 RX ADMIN — Medication 400 MILLIGRAM(S): at 00:32

## 2017-03-25 RX ADMIN — HYDROMORPHONE HYDROCHLORIDE 30 MILLILITER(S): 2 INJECTION INTRAMUSCULAR; INTRAVENOUS; SUBCUTANEOUS at 15:00

## 2017-03-25 RX ADMIN — Medication 20 MILLIGRAM(S): at 18:26

## 2017-03-25 RX ADMIN — ENOXAPARIN SODIUM 40 MILLIGRAM(S): 100 INJECTION SUBCUTANEOUS at 18:26

## 2017-03-25 RX ADMIN — Medication 1000 MILLIGRAM(S): at 01:00

## 2017-03-25 RX ADMIN — SODIUM CHLORIDE 25 MILLILITER(S): 9 INJECTION INTRAMUSCULAR; INTRAVENOUS; SUBCUTANEOUS at 19:21

## 2017-03-26 LAB
ANION GAP SERPL CALC-SCNC: 15 MMOL/L — SIGNIFICANT CHANGE UP (ref 5–17)
BUN SERPL-MCNC: 13 MG/DL — SIGNIFICANT CHANGE UP (ref 7–23)
CALCIUM SERPL-MCNC: 8.9 MG/DL — SIGNIFICANT CHANGE UP (ref 8.4–10.5)
CHLORIDE SERPL-SCNC: 100 MMOL/L — SIGNIFICANT CHANGE UP (ref 96–108)
CO2 SERPL-SCNC: 27 MMOL/L — SIGNIFICANT CHANGE UP (ref 22–31)
CREAT SERPL-MCNC: 0.56 MG/DL — SIGNIFICANT CHANGE UP (ref 0.5–1.3)
GLUCOSE SERPL-MCNC: 99 MG/DL — SIGNIFICANT CHANGE UP (ref 70–99)
HCT VFR BLD CALC: 35.2 % — LOW (ref 39–50)
HGB BLD-MCNC: 11.2 G/DL — LOW (ref 13–17)
MCHC RBC-ENTMCNC: 25.1 PG — LOW (ref 27–34)
MCHC RBC-ENTMCNC: 31.8 GM/DL — LOW (ref 32–36)
MCV RBC AUTO: 78.9 FL — LOW (ref 80–100)
PLATELET # BLD AUTO: 493 K/UL — HIGH (ref 150–400)
POTASSIUM SERPL-MCNC: 4 MMOL/L — SIGNIFICANT CHANGE UP (ref 3.5–5.3)
POTASSIUM SERPL-SCNC: 4 MMOL/L — SIGNIFICANT CHANGE UP (ref 3.5–5.3)
RBC # BLD: 4.46 M/UL — SIGNIFICANT CHANGE UP (ref 4.2–5.8)
RBC # FLD: 17.5 % — HIGH (ref 10.3–14.5)
SODIUM SERPL-SCNC: 142 MMOL/L — SIGNIFICANT CHANGE UP (ref 135–145)
WBC # BLD: 9.33 K/UL — SIGNIFICANT CHANGE UP (ref 3.8–10.5)
WBC # FLD AUTO: 9.33 K/UL — SIGNIFICANT CHANGE UP (ref 3.8–10.5)

## 2017-03-26 PROCEDURE — 99233 SBSQ HOSP IP/OBS HIGH 50: CPT

## 2017-03-26 PROCEDURE — 99232 SBSQ HOSP IP/OBS MODERATE 35: CPT

## 2017-03-26 RX ORDER — DOCUSATE SODIUM 100 MG
100 CAPSULE ORAL
Qty: 0 | Refills: 0 | Status: DISCONTINUED | OUTPATIENT
Start: 2017-03-26 | End: 2017-04-04

## 2017-03-26 RX ORDER — SENNA PLUS 8.6 MG/1
2 TABLET ORAL AT BEDTIME
Qty: 0 | Refills: 0 | Status: DISCONTINUED | OUTPATIENT
Start: 2017-03-26 | End: 2017-04-04

## 2017-03-26 RX ADMIN — HYDROMORPHONE HYDROCHLORIDE 30 MILLILITER(S): 2 INJECTION INTRAMUSCULAR; INTRAVENOUS; SUBCUTANEOUS at 07:25

## 2017-03-26 RX ADMIN — AMPICILLIN SODIUM AND SULBACTAM SODIUM 200 GRAM(S): 250; 125 INJECTION, POWDER, FOR SUSPENSION INTRAMUSCULAR; INTRAVENOUS at 05:17

## 2017-03-26 RX ADMIN — AMPICILLIN SODIUM AND SULBACTAM SODIUM 200 GRAM(S): 250; 125 INJECTION, POWDER, FOR SUSPENSION INTRAMUSCULAR; INTRAVENOUS at 18:27

## 2017-03-26 RX ADMIN — ENOXAPARIN SODIUM 40 MILLIGRAM(S): 100 INJECTION SUBCUTANEOUS at 12:54

## 2017-03-26 RX ADMIN — AMPICILLIN SODIUM AND SULBACTAM SODIUM 200 GRAM(S): 250; 125 INJECTION, POWDER, FOR SUSPENSION INTRAMUSCULAR; INTRAVENOUS at 00:31

## 2017-03-26 RX ADMIN — AMPICILLIN SODIUM AND SULBACTAM SODIUM 200 GRAM(S): 250; 125 INJECTION, POWDER, FOR SUSPENSION INTRAMUSCULAR; INTRAVENOUS at 12:54

## 2017-03-26 RX ADMIN — Medication 20 MILLIGRAM(S): at 05:17

## 2017-03-26 RX ADMIN — Medication 100 MILLIGRAM(S): at 18:39

## 2017-03-26 RX ADMIN — Medication 25 MILLIGRAM(S): at 18:27

## 2017-03-26 RX ADMIN — SODIUM CHLORIDE 25 MILLILITER(S): 9 INJECTION INTRAMUSCULAR; INTRAVENOUS; SUBCUTANEOUS at 08:02

## 2017-03-26 RX ADMIN — Medication 20 MILLIGRAM(S): at 18:27

## 2017-03-26 RX ADMIN — Medication 25 MILLIGRAM(S): at 05:17

## 2017-03-27 LAB
ANION GAP SERPL CALC-SCNC: 15 MMOL/L — SIGNIFICANT CHANGE UP (ref 5–17)
BUN SERPL-MCNC: 19 MG/DL — SIGNIFICANT CHANGE UP (ref 7–23)
CALCIUM SERPL-MCNC: 8.9 MG/DL — SIGNIFICANT CHANGE UP (ref 8.4–10.5)
CHLORIDE SERPL-SCNC: 95 MMOL/L — LOW (ref 96–108)
CO2 SERPL-SCNC: 29 MMOL/L — SIGNIFICANT CHANGE UP (ref 22–31)
CREAT SERPL-MCNC: 0.59 MG/DL — SIGNIFICANT CHANGE UP (ref 0.5–1.3)
GLUCOSE SERPL-MCNC: 100 MG/DL — HIGH (ref 70–99)
HCT VFR BLD CALC: 34.8 % — LOW (ref 39–50)
HGB BLD-MCNC: 10.9 G/DL — LOW (ref 13–17)
MCHC RBC-ENTMCNC: 24.7 PG — LOW (ref 27–34)
MCHC RBC-ENTMCNC: 31.3 GM/DL — LOW (ref 32–36)
MCV RBC AUTO: 78.9 FL — LOW (ref 80–100)
PLATELET # BLD AUTO: 558 K/UL — HIGH (ref 150–400)
POTASSIUM SERPL-MCNC: 3.6 MMOL/L — SIGNIFICANT CHANGE UP (ref 3.5–5.3)
POTASSIUM SERPL-SCNC: 3.6 MMOL/L — SIGNIFICANT CHANGE UP (ref 3.5–5.3)
RBC # BLD: 4.41 M/UL — SIGNIFICANT CHANGE UP (ref 4.2–5.8)
RBC # FLD: 17.5 % — HIGH (ref 10.3–14.5)
SODIUM SERPL-SCNC: 139 MMOL/L — SIGNIFICANT CHANGE UP (ref 135–145)
WBC # BLD: 6.87 K/UL — SIGNIFICANT CHANGE UP (ref 3.8–10.5)
WBC # FLD AUTO: 6.87 K/UL — SIGNIFICANT CHANGE UP (ref 3.8–10.5)

## 2017-03-27 PROCEDURE — 99232 SBSQ HOSP IP/OBS MODERATE 35: CPT

## 2017-03-27 PROCEDURE — 99233 SBSQ HOSP IP/OBS HIGH 50: CPT

## 2017-03-27 PROCEDURE — 71010: CPT | Mod: 26

## 2017-03-27 RX ORDER — IPRATROPIUM/ALBUTEROL SULFATE 18-103MCG
3 AEROSOL WITH ADAPTER (GRAM) INHALATION EVERY 6 HOURS
Qty: 0 | Refills: 0 | Status: DISCONTINUED | OUTPATIENT
Start: 2017-03-27 | End: 2017-04-04

## 2017-03-27 RX ADMIN — HYDROMORPHONE HYDROCHLORIDE 0.5 MILLIGRAM(S): 2 INJECTION INTRAMUSCULAR; INTRAVENOUS; SUBCUTANEOUS at 18:13

## 2017-03-27 RX ADMIN — AMPICILLIN SODIUM AND SULBACTAM SODIUM 200 GRAM(S): 250; 125 INJECTION, POWDER, FOR SUSPENSION INTRAMUSCULAR; INTRAVENOUS at 17:25

## 2017-03-27 RX ADMIN — Medication 100 MILLIGRAM(S): at 05:55

## 2017-03-27 RX ADMIN — SENNA PLUS 2 TABLET(S): 8.6 TABLET ORAL at 21:27

## 2017-03-27 RX ADMIN — Medication 3 MILLILITER(S): at 17:25

## 2017-03-27 RX ADMIN — AMPICILLIN SODIUM AND SULBACTAM SODIUM 200 GRAM(S): 250; 125 INJECTION, POWDER, FOR SUSPENSION INTRAMUSCULAR; INTRAVENOUS at 12:07

## 2017-03-27 RX ADMIN — ENOXAPARIN SODIUM 40 MILLIGRAM(S): 100 INJECTION SUBCUTANEOUS at 17:25

## 2017-03-27 RX ADMIN — SODIUM CHLORIDE 25 MILLILITER(S): 9 INJECTION INTRAMUSCULAR; INTRAVENOUS; SUBCUTANEOUS at 08:32

## 2017-03-27 RX ADMIN — AMPICILLIN SODIUM AND SULBACTAM SODIUM 200 GRAM(S): 250; 125 INJECTION, POWDER, FOR SUSPENSION INTRAMUSCULAR; INTRAVENOUS at 05:55

## 2017-03-27 RX ADMIN — Medication 100 MILLIGRAM(S): at 17:25

## 2017-03-27 RX ADMIN — Medication 25 MILLIGRAM(S): at 05:55

## 2017-03-27 RX ADMIN — Medication 20 MILLIGRAM(S): at 05:55

## 2017-03-27 RX ADMIN — AMPICILLIN SODIUM AND SULBACTAM SODIUM 200 GRAM(S): 250; 125 INJECTION, POWDER, FOR SUSPENSION INTRAMUSCULAR; INTRAVENOUS at 00:22

## 2017-03-27 RX ADMIN — Medication 20 MILLIGRAM(S): at 17:25

## 2017-03-27 RX ADMIN — Medication 25 MILLIGRAM(S): at 17:25

## 2017-03-27 NOTE — DIETITIAN INITIAL EVALUATION ADULT. - ORAL INTAKE PTA
fair/Pt reports since October/November his appetite has been decreased. Pt reports having egg whites with fruit or protein shake made with fruit for breakfast; sometimes skips lunch or something light; rice or pasta with grilled chicken for dinner. Pt reports taking Centrum PTA.

## 2017-03-27 NOTE — DIETITIAN INITIAL EVALUATION ADULT. - NS FNS REASON FOR WEIGHT CHANG
catabolic illness, decreased working out/muscle loss, fluid retention/loss- recurrent pleural effusions s/p right thoracentesis, s/p VATS, 2 chest tubes right side, plan to check left pleural effusion/decreased po intake

## 2017-03-27 NOTE — DIETITIAN INITIAL EVALUATION ADULT. - NUTRITION INTERVENTIONS
nutrient dense snacks/food preferences as requested by patient (specify)/Plain Greek yogurt, fresh fruit salad, chocolate pudding.

## 2017-03-27 NOTE — DIETITIAN INITIAL EVALUATION ADULT. - OTHER INFO
Nutrition assessment for length of stay. Pt reports during admission his appetite and po intake decreased but states his appetite is now improving. Noted varied po intake per flow sheets. Pt declines nutritional supplements. RD obtained food preferences. No nausea or emesis. Last bowel movement was Thursday or Friday (3-4 days ago), RN aware and pt ordered for senna and colace. No chewing or swallowing difficulties at this time. No known food allergies.

## 2017-03-27 NOTE — PROVIDER CONTACT NOTE (CRITICAL VALUE NOTIFICATION) - TEST AND RESULT REPORTED:
Tissue culture drawn on 3/24/17: preliminary growth in fluid media only; Gram positive cocci in pairs and chains

## 2017-03-27 NOTE — DIETITIAN INITIAL EVALUATION ADULT. - NUTRITION INTERVENTION
Medical Food Supplements/Meals and Snack/Collaboration and Referral of Nutrition Care/Nutrition Education

## 2017-03-27 NOTE — PROVIDER CONTACT NOTE (CRITICAL VALUE NOTIFICATION) - SITUATION
Tissue culture drawn on 3/24/17: preliminary growth in fluid media only; Gram positive cocci in pairs and chains
Pts body fluid culture drawn 3/10 pleural fluid preliminary growth fluid media only gram positive cocci in pairs and chains.

## 2017-03-27 NOTE — DIETITIAN INITIAL EVALUATION ADULT. - ENERGY NEEDS
Ht: 71“, Wt: 190.9 lbs- 3/21, BMI: 26.6 kg/m2, IBW: 172 lbs (+/-10%), %IBW: 111%  Pertinent Information: Pt with recent Hodgkin's lymphoma dx, chemo 3/3 at [facility]. Thoracentesis 2/2017 drained over 1 liter. Pt with recurrent pleural effusion s/p right thoracentesis, s/p VATS, 2 chest tubes right side, plan for CT Chest to check Left sided pleural effusion   no edema, no pressure ulcers

## 2017-03-27 NOTE — PROVIDER CONTACT NOTE (CRITICAL VALUE NOTIFICATION) - ACTION/TREATMENT ORDERED:
MICHAEL Locke aware. Will notify primary nurse, will continue to monitor.
Continue Unasyn 3g as ordered. Will continue to monitor pt.

## 2017-03-27 NOTE — DIETITIAN INITIAL EVALUATION ADULT. - NS AS NUTRI INTERV MEALS SNACK
Continue regular diet. Encourage po intake with nutrient dense foods. Provide food preferences as able. Monitor weight, lab values, po intake and GI tolerance. RD to remain available for further nutrition interventions as indicated.

## 2017-03-27 NOTE — DIETITIAN INITIAL EVALUATION ADULT. - NS FNS WEIGHT CHANGE REASON
Pt reports he weighed around 225-230 pounds previously, pt states the last time he weighed 225 pound was around October or November 2016. Noted per RD note on 1/28/217, pt weighed 208 pound and felt he was retaining fluid, pt's last measured wt on previous admission was 2/16/17- 216 pounds. Pt's dosing wt was 200.4 pounds this admission, last measured wt was on 3/21: 190.9 pounds. Discussed with RN obtaining current wt./unintentional

## 2017-03-28 ENCOUNTER — TRANSCRIPTION ENCOUNTER (OUTPATIENT)
Age: 37
End: 2017-03-28

## 2017-03-28 ENCOUNTER — RESULT REVIEW (OUTPATIENT)
Age: 37
End: 2017-03-28

## 2017-03-28 LAB
-  CEFAZOLIN: SIGNIFICANT CHANGE UP
-  CLINDAMYCIN: SIGNIFICANT CHANGE UP
-  PENICILLIN: SIGNIFICANT CHANGE UP
ANION GAP SERPL CALC-SCNC: 12 MMOL/L — SIGNIFICANT CHANGE UP (ref 5–17)
BUN SERPL-MCNC: 17 MG/DL — SIGNIFICANT CHANGE UP (ref 7–23)
CALCIUM SERPL-MCNC: 8.2 MG/DL — LOW (ref 8.4–10.5)
CHLORIDE SERPL-SCNC: 101 MMOL/L — SIGNIFICANT CHANGE UP (ref 96–108)
CO2 SERPL-SCNC: 31 MMOL/L — SIGNIFICANT CHANGE UP (ref 22–31)
CREAT SERPL-MCNC: 0.51 MG/DL — SIGNIFICANT CHANGE UP (ref 0.5–1.3)
GLUCOSE SERPL-MCNC: 144 MG/DL — HIGH (ref 70–99)
HCT VFR BLD CALC: 33.7 % — LOW (ref 39–50)
HGB BLD-MCNC: 10.6 G/DL — LOW (ref 13–17)
MCHC RBC-ENTMCNC: 25 PG — LOW (ref 27–34)
MCHC RBC-ENTMCNC: 31.5 GM/DL — LOW (ref 32–36)
MCV RBC AUTO: 79.5 FL — LOW (ref 80–100)
METHOD TYPE: SIGNIFICANT CHANGE UP
METHOD TYPE: SIGNIFICANT CHANGE UP
PLATELET # BLD AUTO: 508 K/UL — HIGH (ref 150–400)
POTASSIUM SERPL-MCNC: 3.6 MMOL/L — SIGNIFICANT CHANGE UP (ref 3.5–5.3)
POTASSIUM SERPL-SCNC: 3.6 MMOL/L — SIGNIFICANT CHANGE UP (ref 3.5–5.3)
RBC # BLD: 4.24 M/UL — SIGNIFICANT CHANGE UP (ref 4.2–5.8)
RBC # FLD: 17.4 % — HIGH (ref 10.3–14.5)
SODIUM SERPL-SCNC: 144 MMOL/L — SIGNIFICANT CHANGE UP (ref 135–145)
WBC # BLD: 6.91 K/UL — SIGNIFICANT CHANGE UP (ref 3.8–10.5)
WBC # FLD AUTO: 6.91 K/UL — SIGNIFICANT CHANGE UP (ref 3.8–10.5)

## 2017-03-28 PROCEDURE — 88108 CYTOPATH CONCENTRATE TECH: CPT | Mod: 26

## 2017-03-28 PROCEDURE — 99233 SBSQ HOSP IP/OBS HIGH 50: CPT

## 2017-03-28 PROCEDURE — 71010: CPT | Mod: 26,77

## 2017-03-28 PROCEDURE — 71010: CPT | Mod: 26

## 2017-03-28 PROCEDURE — 71250 CT THORAX DX C-: CPT | Mod: 26

## 2017-03-28 PROCEDURE — 99232 SBSQ HOSP IP/OBS MODERATE 35: CPT

## 2017-03-28 RX ADMIN — AMPICILLIN SODIUM AND SULBACTAM SODIUM 200 GRAM(S): 250; 125 INJECTION, POWDER, FOR SUSPENSION INTRAMUSCULAR; INTRAVENOUS at 05:53

## 2017-03-28 RX ADMIN — Medication 100 MILLIGRAM(S): at 05:53

## 2017-03-28 RX ADMIN — AMPICILLIN SODIUM AND SULBACTAM SODIUM 200 GRAM(S): 250; 125 INJECTION, POWDER, FOR SUSPENSION INTRAMUSCULAR; INTRAVENOUS at 23:51

## 2017-03-28 RX ADMIN — Medication 3 MILLILITER(S): at 00:12

## 2017-03-28 RX ADMIN — Medication 25 MILLIGRAM(S): at 17:14

## 2017-03-28 RX ADMIN — AMPICILLIN SODIUM AND SULBACTAM SODIUM 200 GRAM(S): 250; 125 INJECTION, POWDER, FOR SUSPENSION INTRAMUSCULAR; INTRAVENOUS at 00:23

## 2017-03-28 RX ADMIN — Medication 20 MILLIGRAM(S): at 05:53

## 2017-03-28 RX ADMIN — AMPICILLIN SODIUM AND SULBACTAM SODIUM 200 GRAM(S): 250; 125 INJECTION, POWDER, FOR SUSPENSION INTRAMUSCULAR; INTRAVENOUS at 11:48

## 2017-03-28 RX ADMIN — Medication 3 MILLILITER(S): at 17:19

## 2017-03-28 RX ADMIN — Medication 3 MILLILITER(S): at 11:47

## 2017-03-28 RX ADMIN — Medication 3 MILLILITER(S): at 23:50

## 2017-03-28 RX ADMIN — Medication 20 MILLIGRAM(S): at 17:14

## 2017-03-28 RX ADMIN — Medication 25 MILLIGRAM(S): at 05:52

## 2017-03-28 RX ADMIN — AMPICILLIN SODIUM AND SULBACTAM SODIUM 200 GRAM(S): 250; 125 INJECTION, POWDER, FOR SUSPENSION INTRAMUSCULAR; INTRAVENOUS at 17:19

## 2017-03-28 RX ADMIN — SODIUM CHLORIDE 25 MILLILITER(S): 9 INJECTION INTRAMUSCULAR; INTRAVENOUS; SUBCUTANEOUS at 11:47

## 2017-03-28 RX ADMIN — Medication 3 MILLILITER(S): at 05:53

## 2017-03-28 RX ADMIN — SENNA PLUS 2 TABLET(S): 8.6 TABLET ORAL at 21:39

## 2017-03-28 RX ADMIN — ENOXAPARIN SODIUM 40 MILLIGRAM(S): 100 INJECTION SUBCUTANEOUS at 13:47

## 2017-03-28 RX ADMIN — HYDROMORPHONE HYDROCHLORIDE 30 MILLILITER(S): 2 INJECTION INTRAMUSCULAR; INTRAVENOUS; SUBCUTANEOUS at 19:05

## 2017-03-28 RX ADMIN — HYDROMORPHONE HYDROCHLORIDE 30 MILLILITER(S): 2 INJECTION INTRAMUSCULAR; INTRAVENOUS; SUBCUTANEOUS at 07:39

## 2017-03-28 RX ADMIN — HYDROMORPHONE HYDROCHLORIDE 30 MILLILITER(S): 2 INJECTION INTRAMUSCULAR; INTRAVENOUS; SUBCUTANEOUS at 02:35

## 2017-03-28 RX ADMIN — Medication 100 MILLIGRAM(S): at 17:14

## 2017-03-28 NOTE — DISCHARGE NOTE ADULT - CARE PROVIDERS DIRECT ADDRESSES
,[email or web address],DirectAddress_Unknown

## 2017-03-28 NOTE — DISCHARGE NOTE ADULT - HOSPITAL COURSE
per attending 36M w/hx of recently diagnosed hodgkin's lymphoma, recent admission here one month ago for shortness of breath 2/2 pleural effusions b/l who now presents with recurrent shortness of breath x 2 days.     Dx: SOB L pleural effusion with chest tube in setting of Non-hodgkins lymphoma        3/10 Strep pleural fluid on zoysn        Acute on chronic diastolic ( Ef 55) CHF on IV lasix   pulm , CTS evaluated pt  	s/p  placement of a 10 [place] pig tail into R pleural space                  CXR - Unchanged large right pleural effusion with extensive atelectasis volume loss. Mild 	improvement/mild decrease in left pleural effusion.                 pleural fluid cx (+) GPC, s/p vanco x 1 dose, s/p house ID consult, zosyn started  	echo: EF 40%, Moderate global left ventricular systolic dysfunction.  Regional wall motion variation is noted.  2. Global Longitudinal Strain 3. Normal right ventricular size and function.  4. Estimated pulmonary artery systolic pressure equals 31  mm Hg, assuming right atrial pressure equals 8  mm Hg,  consistent with normal pulmonary pressures.5. Small pericardial effusion.The effusion is focally moderate adjacent to the RA. No tamponade. 6. Bilateral pleural effusions  3/15	Card: increased lasix 20mg iv Q12, repeat Echo   	  3/16	NM MUGA d/t EF drops from 55% to 40%: Normal gated blood pool study. Normal LV EF 55 %, improved from gated blood pool study on 2/2/2017, Previously noted mild global hypokinesia is no longer demonstrated.  3/17        CXR: No change in the bilateral pleural effusions.    3/20       CXR: Bilateral pleural effusions, slightly decreased on the right. plan for vats.  3/21 :      CTS : May require 2nd tube for drainage Vs R VATS pleurodesis .  ID : C/w Abx through 3/27. C/w Unasyn  Stable for D/c on augmentin.    : Patient will be received  TPA via chest tube . Total of 6 doses q12h .    color change in Chest Tube drainage. Serosanguinous to blood tinged. Pt recieved TPA via Chest Tube. Total of 6 doses q12h.     3/22 CXR: Right pigtail catheter in place. Small to moderate right and small left pleural effusions  3/23 Better pain control   3/27: hx hodgkin's lymphoma, recurrent pleural effusion s/p rt thracentesis, s/p VATS, 2 chest tubes rt side    03/29- s/p left thoracentesis - 1400 ml pleural fluid removed.     03/31: s/p Chemo

## 2017-03-28 NOTE — DISCHARGE NOTE ADULT - NS AS ACTIVITY OBS
Showering allowed/Walking-Outdoors allowed/Stairs allowed/No Heavy lifting/straining/Walking-Indoors allowed

## 2017-03-28 NOTE — DISCHARGE NOTE ADULT - PROVIDER TOKENS
TOKEN:'[id number]' TOKEN:'[id number]:'[id number]:'[id number]',TOKEN:'[id number]'

## 2017-03-28 NOTE — DISCHARGE NOTE ADULT - ADDITIONAL INSTRUCTIONS
follow up with Dr DAVID Murphy     CTs for incsion check and suture removal  ,  Obtain chest xray  day of or one day prior to seeing Dr Murphy follow up with Dr DAVID Murphy CTs for incsion check and suture removal ,  Obtain chest xray  day of or one day prior to seeing Dr Murphy  Make appointments to follow up with your out patient physicians.  Bring all discharge paperwork including discharge medication list to your follow up appointments. follow up with Dr DAVID Murphy CTs for incision check and suture removal ,  Obtain chest xray  day of or one day prior to seeing Dr Murphy  Make appointments to follow up with your out patient physicians.  Bring all discharge paperwork including discharge medication list to your follow up appointments.

## 2017-03-28 NOTE — DISCHARGE NOTE ADULT - CARE PROVIDER_API CALL
Danny Murphy (MD), Thoracic Surgery  [address] [place] [address]  Phone: [phone]  Fax: [phone] Danny Murphy), Thoracic Surgery  [address] [place] [address]  Phone: [phone]  Fax: [phone]    oMe Leggett), Internal Medicine  [address] [place] [address]  Phone: [phone]  Fax: [phone]    Hung Garcia), Critical Care Medicine; Internal Medicine; Pulmonary Disease; Sleep Medicine  [address] [place] [address]  Phone: [phone]  Fax: [phone]    Alex Galvin), Internal Medicine  [address] [place] [address]  Phone: [phone]  Fax: [phone]

## 2017-03-28 NOTE — DISCHARGE NOTE ADULT - CARE PLAN
Principal Discharge DX:	Shortness of breath  Goal:	Remain without shortness of breath  Instructions for follow-up, activity and diet:	Continue your current medications  Secondary Diagnosis:	Pleural effusion  Instructions for follow-up, activity and diet:	Continue current medications  Follow up with Dr. Murphy in 1-2 weeks  Secondary Diagnosis:	Empyema lung  Instructions for follow-up, activity and diet:	Continue antibiotics until finished  Call your doctor if you develop fever or chills  Secondary Diagnosis:	Hodgkin lymphoma, unspecified Hodgkin lymphoma type, unspecified body region  Instructions for follow-up, activity and diet:	Follow up with Dr. Bell in 1 week or as scheduled for chemotherapy and continue treatment.  Secondary Diagnosis:	Tachycardia  Instructions for follow-up, activity and diet:	Continue Lopressor.  Follow up with your doctor/cardiology in 1-2 weeks

## 2017-03-28 NOTE — DISCHARGE NOTE ADULT - MEDICATION SUMMARY - MEDICATIONS TO STOP TAKING
I will STOP taking the medications listed below when I get home from the hospital:    allopurinol 300 mg oral tablet  -- 1 tab(s) by mouth once a day    ibuprofen 200 mg oral tablet  -- 1 tab(s) by mouth , As Needed

## 2017-03-28 NOTE — DISCHARGE NOTE ADULT - PLAN OF CARE
Remain without shortness of breath Continue your current medications Continue current medications  Follow up with Dr. Murphy in 1-2 weeks Continue antibiotics until finished  Call your doctor if you develop fever or chills Follow up with Dr. Bell in 1 week or as scheduled for chemotherapy and continue treatment. Continue Lopressor.  Follow up with your doctor/cardiology in 1-2 weeks

## 2017-03-28 NOTE — DISCHARGE NOTE ADULT - MEDICATION SUMMARY - MEDICATIONS TO TAKE
I will START or STAY ON the medications listed below when I get home from the hospital:    oxyCODONE 10 mg oral tablet  -- 1 tab(s) by mouth every 4 hours, As needed, Severe Pain (7 - 10) MDD:6 tabs  -- Indication: For Pain    Zofran 4 mg oral tablet  -- 1 tab(s) by mouth , As Needed  -- Indication: For Nausea    metoprolol tartrate 25 mg oral tablet  -- 1 tab(s) by mouth 2 times a day  -- Indication: For Tachycardia    Advair Diskus 250 mcg-50 mcg inhalation powder  -- 1 puff(s) inhaled 2 times a day, As Needed  -- Check with your doctor before becoming pregnant.  For inhalation only.  Obtain medical advice before taking any non-prescription drugs as some may affect the action of this medication.  Rinse mouth thoroughly after use.    -- Indication: For Shortness of breath    furosemide 20 mg oral tablet  -- 1 tab(s) by mouth 2 times a day  -- Avoid prolonged or excessive exposure to direct and/or artificial sunlight while taking this medication.  It is very important that you take or use this exactly as directed.  Do not skip doses or discontinue unless directed by your doctor.  It may be advisable to drink a full glass orange juice or eat a banana daily while taking this medication.    -- Indication: For Shortness of breath    guaiFENesin 100 mg/5 mL oral liquid  -- 5 milliliter(s) by mouth every 6 hours, As needed, Cough  -- Indication: For cough    senna oral tablet  -- 2 tab(s) by mouth once a day (at bedtime)  -- Indication: For constipation    bisacodyl 5 mg oral delayed release tablet  -- 1 tab(s) by mouth once a day (at bedtime)  -- Indication: For constipation    docusate sodium 100 mg oral capsule  -- 1 cap(s) by mouth 2 times a day  -- Indication: For constipation    polyethylene glycol 3350 oral powder for reconstitution  -- 17 gram(s) by mouth once a day, As needed, Constipation  -- Indication: For constipation    benzocaine-menthol 15 mg-3.6 mg mucous membrane lozenge  -- 1 lozenge mucous membrane 3 times a day  -- Indication: For Sore throat    amoxicillin-clavulanate 875 mg-125 mg oral tablet  -- 1 tab(s) by mouth every 12 hours  -- Finish all this medication unless otherwise directed by prescriber.  Take with food or milk.    -- Indication: For Empyema lung

## 2017-03-28 NOTE — DISCHARGE NOTE ADULT - PATIENT PORTAL LINK FT
“You can access the FollowHealth Patient Portal, offered by [facility], by registering with the following website: [email or web address]”

## 2017-03-29 LAB
ALBUMIN FLD-MCNC: 1.8 G/DL — SIGNIFICANT CHANGE UP
ANION GAP SERPL CALC-SCNC: 12 MMOL/L — SIGNIFICANT CHANGE UP (ref 5–17)
B PERT IGG+IGM PNL SER: ABNORMAL
BUN SERPL-MCNC: 19 MG/DL — SIGNIFICANT CHANGE UP (ref 7–23)
CALCIUM SERPL-MCNC: 8.2 MG/DL — LOW (ref 8.4–10.5)
CHLORIDE SERPL-SCNC: 98 MMOL/L — SIGNIFICANT CHANGE UP (ref 96–108)
CO2 SERPL-SCNC: 29 MMOL/L — SIGNIFICANT CHANGE UP (ref 22–31)
COLOR FLD: YELLOW — SIGNIFICANT CHANGE UP
CREAT SERPL-MCNC: 0.47 MG/DL — LOW (ref 0.5–1.3)
CULTURE RESULTS: SIGNIFICANT CHANGE UP
CULTURE RESULTS: SIGNIFICANT CHANGE UP
FLUID INTAKE SUBSTANCE CLASS: SIGNIFICANT CHANGE UP
FLUID SEGMENTED GRANULOCYTES: 68 % — SIGNIFICANT CHANGE UP
GLUCOSE FLD-MCNC: 108 MG/DL — SIGNIFICANT CHANGE UP
GLUCOSE SERPL-MCNC: 92 MG/DL — SIGNIFICANT CHANGE UP (ref 70–99)
GRAM STN FLD: SIGNIFICANT CHANGE UP
HCT VFR BLD CALC: 32.3 % — LOW (ref 39–50)
HGB BLD-MCNC: 10 G/DL — LOW (ref 13–17)
LDH SERPL L TO P-CCNC: 103 U/L — SIGNIFICANT CHANGE UP
LYMPHOCYTES # FLD: 12 % — SIGNIFICANT CHANGE UP
MCHC RBC-ENTMCNC: 24.5 PG — LOW (ref 27–34)
MCHC RBC-ENTMCNC: 31 GM/DL — LOW (ref 32–36)
MCV RBC AUTO: 79.2 FL — LOW (ref 80–100)
MESOTHL CELL # FLD: 10 % — SIGNIFICANT CHANGE UP
MONOS+MACROS # FLD: 9 % — SIGNIFICANT CHANGE UP
NIGHT BLUE STAIN TISS: SIGNIFICANT CHANGE UP
ORGANISM # SPEC MICROSCOPIC CNT: SIGNIFICANT CHANGE UP
OTHER CELLS FLD MANUAL: 1 % — SIGNIFICANT CHANGE UP
PH FLD: 7.58 — SIGNIFICANT CHANGE UP
PLATELET # BLD AUTO: 491 K/UL — HIGH (ref 150–400)
POTASSIUM SERPL-MCNC: 3.6 MMOL/L — SIGNIFICANT CHANGE UP (ref 3.5–5.3)
POTASSIUM SERPL-SCNC: 3.6 MMOL/L — SIGNIFICANT CHANGE UP (ref 3.5–5.3)
PROT FLD-MCNC: 3.4 G/DL — SIGNIFICANT CHANGE UP
RBC # BLD: 4.08 M/UL — LOW (ref 4.2–5.8)
RBC # FLD: 17 % — HIGH (ref 10.3–14.5)
RCV VOL RI: 4850 /UL — HIGH (ref 0–5)
SODIUM SERPL-SCNC: 139 MMOL/L — SIGNIFICANT CHANGE UP (ref 135–145)
SPECIMEN SOURCE FLD: SIGNIFICANT CHANGE UP
SPECIMEN SOURCE FLD: SIGNIFICANT CHANGE UP
SPECIMEN SOURCE: SIGNIFICANT CHANGE UP
TOTAL NUCLEATED CELL COUNT, BODY FLUID: 590 /UL — HIGH (ref 0–5)
TUBE TYPE: SIGNIFICANT CHANGE UP
WBC # BLD: 6.23 K/UL — SIGNIFICANT CHANGE UP (ref 3.8–10.5)
WBC # FLD AUTO: 6.23 K/UL — SIGNIFICANT CHANGE UP (ref 3.8–10.5)

## 2017-03-29 PROCEDURE — 88112 CYTOPATH CELL ENHANCE TECH: CPT | Mod: 26

## 2017-03-29 PROCEDURE — 88305 TISSUE EXAM BY PATHOLOGIST: CPT | Mod: 26

## 2017-03-29 PROCEDURE — 99233 SBSQ HOSP IP/OBS HIGH 50: CPT

## 2017-03-29 PROCEDURE — 99232 SBSQ HOSP IP/OBS MODERATE 35: CPT

## 2017-03-29 PROCEDURE — 71010: CPT | Mod: 26,76

## 2017-03-29 PROCEDURE — 32555 ASPIRATE PLEURA W/ IMAGING: CPT | Mod: LT

## 2017-03-29 RX ADMIN — Medication 20 MILLIGRAM(S): at 17:17

## 2017-03-29 RX ADMIN — Medication 20 MILLIGRAM(S): at 05:51

## 2017-03-29 RX ADMIN — Medication 100 MILLIGRAM(S): at 17:17

## 2017-03-29 RX ADMIN — HYDROMORPHONE HYDROCHLORIDE 30 MILLILITER(S): 2 INJECTION INTRAMUSCULAR; INTRAVENOUS; SUBCUTANEOUS at 19:08

## 2017-03-29 RX ADMIN — Medication 3 MILLILITER(S): at 05:51

## 2017-03-29 RX ADMIN — SENNA PLUS 2 TABLET(S): 8.6 TABLET ORAL at 21:42

## 2017-03-29 RX ADMIN — AMPICILLIN SODIUM AND SULBACTAM SODIUM 200 GRAM(S): 250; 125 INJECTION, POWDER, FOR SUSPENSION INTRAMUSCULAR; INTRAVENOUS at 23:11

## 2017-03-29 RX ADMIN — Medication 3 MILLILITER(S): at 11:25

## 2017-03-29 RX ADMIN — ENOXAPARIN SODIUM 40 MILLIGRAM(S): 100 INJECTION SUBCUTANEOUS at 14:55

## 2017-03-29 RX ADMIN — HYDROMORPHONE HYDROCHLORIDE 30 MILLILITER(S): 2 INJECTION INTRAMUSCULAR; INTRAVENOUS; SUBCUTANEOUS at 07:12

## 2017-03-29 RX ADMIN — Medication 25 MILLIGRAM(S): at 05:51

## 2017-03-29 RX ADMIN — Medication 3 MILLILITER(S): at 17:17

## 2017-03-29 RX ADMIN — HYDROMORPHONE HYDROCHLORIDE 30 MILLILITER(S): 2 INJECTION INTRAMUSCULAR; INTRAVENOUS; SUBCUTANEOUS at 22:43

## 2017-03-29 RX ADMIN — AMPICILLIN SODIUM AND SULBACTAM SODIUM 200 GRAM(S): 250; 125 INJECTION, POWDER, FOR SUSPENSION INTRAMUSCULAR; INTRAVENOUS at 11:30

## 2017-03-29 RX ADMIN — Medication 100 MILLIGRAM(S): at 05:50

## 2017-03-29 RX ADMIN — AMPICILLIN SODIUM AND SULBACTAM SODIUM 200 GRAM(S): 250; 125 INJECTION, POWDER, FOR SUSPENSION INTRAMUSCULAR; INTRAVENOUS at 05:51

## 2017-03-29 RX ADMIN — Medication 3 MILLILITER(S): at 23:11

## 2017-03-29 RX ADMIN — SODIUM CHLORIDE 25 MILLILITER(S): 9 INJECTION INTRAMUSCULAR; INTRAVENOUS; SUBCUTANEOUS at 11:25

## 2017-03-29 RX ADMIN — AMPICILLIN SODIUM AND SULBACTAM SODIUM 200 GRAM(S): 250; 125 INJECTION, POWDER, FOR SUSPENSION INTRAMUSCULAR; INTRAVENOUS at 17:17

## 2017-03-29 RX ADMIN — Medication 25 MILLIGRAM(S): at 17:17

## 2017-03-29 NOTE — PHYSICAL THERAPY INITIAL EVALUATION ADULT - ADDITIONAL COMMENTS
pertinent history continued...  Patient denies any fevers or chills, denies other chest pains.  Denies noticing palpitations.  No leg swelling though he has had this in the past prior to chemotherapy.  No recent nausea/vomiting though he has PRN zofran at home for use after chemo.

## 2017-03-29 NOTE — PHYSICAL THERAPY INITIAL EVALUATION ADULT - LIVES WITH, PROFILE
Pt lives in an apartment complex with spouse. Pt reports 0 stairs to negotiate to enter, 0 stairs within.

## 2017-03-29 NOTE — PHYSICAL THERAPY INITIAL EVALUATION ADULT - PERTINENT HX OF CURRENT PROBLEM, REHAB EVAL
Pt is a 37 y/o male w/hx of recently diagnosed hodgkin's lymphoma, recent admission here one month ago for shortness of breath 2/2 pleural effusions b/l who now presents with recurrent shortness of breath x 2 days.  Patient reports that over the last 2 days he has noticed significant shortness of breath both at rest and with exertion.  This is associated with a right lower chest and back "pressure-like" sensation that is painful when he coughs, 4-5/10 in intensity.

## 2017-03-30 ENCOUNTER — TRANSCRIPTION ENCOUNTER (OUTPATIENT)
Age: 37
End: 2017-03-30

## 2017-03-30 LAB
ANION GAP SERPL CALC-SCNC: 14 MMOL/L — SIGNIFICANT CHANGE UP (ref 5–17)
BUN SERPL-MCNC: 16 MG/DL — SIGNIFICANT CHANGE UP (ref 7–23)
CALCIUM SERPL-MCNC: 7.8 MG/DL — LOW (ref 8.4–10.5)
CHLORIDE SERPL-SCNC: 99 MMOL/L — SIGNIFICANT CHANGE UP (ref 96–108)
CO2 SERPL-SCNC: 27 MMOL/L — SIGNIFICANT CHANGE UP (ref 22–31)
COMMENT - FLUIDS: SIGNIFICANT CHANGE UP
CREAT SERPL-MCNC: 0.49 MG/DL — LOW (ref 0.5–1.3)
GLUCOSE SERPL-MCNC: 89 MG/DL — SIGNIFICANT CHANGE UP (ref 70–99)
HCT VFR BLD CALC: 34.6 % — LOW (ref 39–50)
HGB BLD-MCNC: 10.8 G/DL — LOW (ref 13–17)
MCHC RBC-ENTMCNC: 24.4 PG — LOW (ref 27–34)
MCHC RBC-ENTMCNC: 31.2 GM/DL — LOW (ref 32–36)
MCV RBC AUTO: 78.3 FL — LOW (ref 80–100)
PLATELET # BLD AUTO: 425 K/UL — HIGH (ref 150–400)
POTASSIUM SERPL-MCNC: 3.7 MMOL/L — SIGNIFICANT CHANGE UP (ref 3.5–5.3)
POTASSIUM SERPL-SCNC: 3.7 MMOL/L — SIGNIFICANT CHANGE UP (ref 3.5–5.3)
RBC # BLD: 4.42 M/UL — SIGNIFICANT CHANGE UP (ref 4.2–5.8)
RBC # FLD: 16.9 % — HIGH (ref 10.3–14.5)
SODIUM SERPL-SCNC: 140 MMOL/L — SIGNIFICANT CHANGE UP (ref 135–145)
SURGICAL PATHOLOGY STUDY: SIGNIFICANT CHANGE UP
WBC # BLD: 6.19 K/UL — SIGNIFICANT CHANGE UP (ref 3.8–10.5)
WBC # FLD AUTO: 6.19 K/UL — SIGNIFICANT CHANGE UP (ref 3.8–10.5)

## 2017-03-30 PROCEDURE — 99233 SBSQ HOSP IP/OBS HIGH 50: CPT | Mod: GC

## 2017-03-30 PROCEDURE — 99232 SBSQ HOSP IP/OBS MODERATE 35: CPT

## 2017-03-30 RX ADMIN — Medication 100 MILLIGRAM(S): at 17:03

## 2017-03-30 RX ADMIN — HYDROMORPHONE HYDROCHLORIDE 30 MILLILITER(S): 2 INJECTION INTRAMUSCULAR; INTRAVENOUS; SUBCUTANEOUS at 19:44

## 2017-03-30 RX ADMIN — Medication 3 MILLILITER(S): at 17:03

## 2017-03-30 RX ADMIN — Medication 25 MILLIGRAM(S): at 06:19

## 2017-03-30 RX ADMIN — Medication 3 MILLILITER(S): at 23:13

## 2017-03-30 RX ADMIN — AMPICILLIN SODIUM AND SULBACTAM SODIUM 200 GRAM(S): 250; 125 INJECTION, POWDER, FOR SUSPENSION INTRAMUSCULAR; INTRAVENOUS at 11:23

## 2017-03-30 RX ADMIN — Medication 25 MILLIGRAM(S): at 17:03

## 2017-03-30 RX ADMIN — Medication 100 MILLIGRAM(S): at 06:19

## 2017-03-30 RX ADMIN — Medication 3 MILLILITER(S): at 06:19

## 2017-03-30 RX ADMIN — AMPICILLIN SODIUM AND SULBACTAM SODIUM 200 GRAM(S): 250; 125 INJECTION, POWDER, FOR SUSPENSION INTRAMUSCULAR; INTRAVENOUS at 17:03

## 2017-03-30 RX ADMIN — ENOXAPARIN SODIUM 40 MILLIGRAM(S): 100 INJECTION SUBCUTANEOUS at 11:23

## 2017-03-30 RX ADMIN — Medication 20 MILLIGRAM(S): at 17:03

## 2017-03-30 RX ADMIN — AMPICILLIN SODIUM AND SULBACTAM SODIUM 200 GRAM(S): 250; 125 INJECTION, POWDER, FOR SUSPENSION INTRAMUSCULAR; INTRAVENOUS at 23:13

## 2017-03-30 RX ADMIN — SENNA PLUS 2 TABLET(S): 8.6 TABLET ORAL at 21:08

## 2017-03-30 RX ADMIN — AMPICILLIN SODIUM AND SULBACTAM SODIUM 200 GRAM(S): 250; 125 INJECTION, POWDER, FOR SUSPENSION INTRAMUSCULAR; INTRAVENOUS at 06:19

## 2017-03-30 RX ADMIN — Medication 20 MILLIGRAM(S): at 06:19

## 2017-03-30 RX ADMIN — SODIUM CHLORIDE 25 MILLILITER(S): 9 INJECTION INTRAMUSCULAR; INTRAVENOUS; SUBCUTANEOUS at 18:44

## 2017-03-30 RX ADMIN — Medication 3 MILLILITER(S): at 11:23

## 2017-03-31 ENCOUNTER — APPOINTMENT (OUTPATIENT)
Dept: INFUSION THERAPY | Facility: HOSPITAL | Age: 37
End: 2017-03-31

## 2017-03-31 LAB
ANION GAP SERPL CALC-SCNC: 10 MMOL/L — SIGNIFICANT CHANGE UP (ref 5–17)
BUN SERPL-MCNC: 16 MG/DL — SIGNIFICANT CHANGE UP (ref 7–23)
CALCIUM SERPL-MCNC: 7.9 MG/DL — LOW (ref 8.4–10.5)
CHLORIDE SERPL-SCNC: 102 MMOL/L — SIGNIFICANT CHANGE UP (ref 96–108)
CO2 SERPL-SCNC: 29 MMOL/L — SIGNIFICANT CHANGE UP (ref 22–31)
CREAT SERPL-MCNC: 0.57 MG/DL — SIGNIFICANT CHANGE UP (ref 0.5–1.3)
GLUCOSE SERPL-MCNC: 101 MG/DL — HIGH (ref 70–99)
HCT VFR BLD CALC: 56.5 % — HIGH (ref 39–50)
HGB BLD-MCNC: 18.7 G/DL — HIGH (ref 13–17)
MCHC RBC-ENTMCNC: 25.8 PG — LOW (ref 27–34)
MCHC RBC-ENTMCNC: 33.1 GM/DL — SIGNIFICANT CHANGE UP (ref 32–36)
MCV RBC AUTO: 77.8 FL — LOW (ref 80–100)
PLATELET # BLD AUTO: 141 K/UL — LOW (ref 150–400)
POTASSIUM SERPL-MCNC: 4 MMOL/L — SIGNIFICANT CHANGE UP (ref 3.5–5.3)
POTASSIUM SERPL-SCNC: 4 MMOL/L — SIGNIFICANT CHANGE UP (ref 3.5–5.3)
RBC # BLD: 7.26 M/UL — HIGH (ref 4.2–5.8)
RBC # FLD: 17.9 % — HIGH (ref 10.3–14.5)
SODIUM SERPL-SCNC: 141 MMOL/L — SIGNIFICANT CHANGE UP (ref 135–145)
WBC # BLD: 2.27 K/UL — LOW (ref 3.8–10.5)
WBC # FLD AUTO: 2.27 K/UL — LOW (ref 3.8–10.5)

## 2017-03-31 PROCEDURE — 99233 SBSQ HOSP IP/OBS HIGH 50: CPT | Mod: GC

## 2017-03-31 PROCEDURE — 99232 SBSQ HOSP IP/OBS MODERATE 35: CPT

## 2017-03-31 PROCEDURE — 99233 SBSQ HOSP IP/OBS HIGH 50: CPT

## 2017-03-31 RX ORDER — BENZOCAINE AND MENTHOL 5; 1 G/100ML; G/100ML
1 LIQUID ORAL THREE TIMES A DAY
Qty: 0 | Refills: 0 | Status: DISCONTINUED | OUTPATIENT
Start: 2017-03-31 | End: 2017-04-04

## 2017-03-31 RX ORDER — OXYCODONE HYDROCHLORIDE 5 MG/1
10 TABLET ORAL EVERY 4 HOURS
Qty: 0 | Refills: 0 | Status: DISCONTINUED | OUTPATIENT
Start: 2017-03-31 | End: 2017-04-04

## 2017-03-31 RX ORDER — DOXORUBICIN HYDROCHLORIDE 2 MG/ML
52 INJECTION, SOLUTION INTRAVENOUS ONCE
Qty: 0 | Refills: 0 | Status: DISCONTINUED | OUTPATIENT
Start: 2017-03-31 | End: 2017-04-04

## 2017-03-31 RX ORDER — FOSAPREPITANT DIMEGLUMINE 150 MG/5ML
150 INJECTION, POWDER, LYOPHILIZED, FOR SOLUTION INTRAVENOUS ONCE
Qty: 0 | Refills: 0 | Status: COMPLETED | OUTPATIENT
Start: 2017-03-31 | End: 2017-03-31

## 2017-03-31 RX ORDER — ONDANSETRON 8 MG/1
16 TABLET, FILM COATED ORAL DAILY
Qty: 0 | Refills: 0 | Status: COMPLETED | OUTPATIENT
Start: 2017-03-31 | End: 2017-03-31

## 2017-03-31 RX ORDER — POLYETHYLENE GLYCOL 3350 17 G/17G
17 POWDER, FOR SOLUTION ORAL DAILY
Qty: 0 | Refills: 0 | Status: DISCONTINUED | OUTPATIENT
Start: 2017-03-31 | End: 2017-04-04

## 2017-03-31 RX ORDER — DACARBAZINE 10 MG/ML
780 INJECTION, POWDER, FOR SOLUTION INTRAVENOUS ONCE
Qty: 0 | Refills: 0 | Status: DISCONTINUED | OUTPATIENT
Start: 2017-03-31 | End: 2017-04-04

## 2017-03-31 RX ORDER — ONDANSETRON 8 MG/1
4 TABLET, FILM COATED ORAL ONCE
Qty: 0 | Refills: 0 | Status: COMPLETED | OUTPATIENT
Start: 2017-03-31 | End: 2017-04-01

## 2017-03-31 RX ORDER — VINBLASTINE SULFATE 10 MG
12.5 VIAL (EA) INTRAVENOUS ONCE
Qty: 0 | Refills: 0 | Status: DISCONTINUED | OUTPATIENT
Start: 2017-03-31 | End: 2017-04-04

## 2017-03-31 RX ORDER — OXYCODONE HYDROCHLORIDE 5 MG/1
5 TABLET ORAL
Qty: 0 | Refills: 0 | Status: DISCONTINUED | OUTPATIENT
Start: 2017-03-31 | End: 2017-04-04

## 2017-03-31 RX ADMIN — HYDROMORPHONE HYDROCHLORIDE 30 MILLILITER(S): 2 INJECTION INTRAMUSCULAR; INTRAVENOUS; SUBCUTANEOUS at 07:03

## 2017-03-31 RX ADMIN — Medication 3 MILLILITER(S): at 06:34

## 2017-03-31 RX ADMIN — FOSAPREPITANT DIMEGLUMINE 300 MILLIGRAM(S): 150 INJECTION, POWDER, LYOPHILIZED, FOR SOLUTION INTRAVENOUS at 17:39

## 2017-03-31 RX ADMIN — Medication 3 MILLILITER(S): at 11:58

## 2017-03-31 RX ADMIN — AMPICILLIN SODIUM AND SULBACTAM SODIUM 200 GRAM(S): 250; 125 INJECTION, POWDER, FOR SUSPENSION INTRAMUSCULAR; INTRAVENOUS at 17:25

## 2017-03-31 RX ADMIN — BENZOCAINE AND MENTHOL 1 LOZENGE: 5; 1 LIQUID ORAL at 17:25

## 2017-03-31 RX ADMIN — ONDANSETRON 116 MILLIGRAM(S): 8 TABLET, FILM COATED ORAL at 17:39

## 2017-03-31 RX ADMIN — AMPICILLIN SODIUM AND SULBACTAM SODIUM 200 GRAM(S): 250; 125 INJECTION, POWDER, FOR SUSPENSION INTRAMUSCULAR; INTRAVENOUS at 11:58

## 2017-03-31 RX ADMIN — Medication 3 MILLILITER(S): at 17:25

## 2017-03-31 RX ADMIN — OXYCODONE HYDROCHLORIDE 10 MILLIGRAM(S): 5 TABLET ORAL at 18:30

## 2017-03-31 RX ADMIN — Medication 25 MILLIGRAM(S): at 17:26

## 2017-03-31 RX ADMIN — OXYCODONE HYDROCHLORIDE 10 MILLIGRAM(S): 5 TABLET ORAL at 17:31

## 2017-03-31 RX ADMIN — Medication 25 MILLIGRAM(S): at 06:35

## 2017-03-31 RX ADMIN — Medication 20 MILLIGRAM(S): at 06:33

## 2017-03-31 RX ADMIN — OXYCODONE HYDROCHLORIDE 10 MILLIGRAM(S): 5 TABLET ORAL at 11:59

## 2017-03-31 RX ADMIN — AMPICILLIN SODIUM AND SULBACTAM SODIUM 200 GRAM(S): 250; 125 INJECTION, POWDER, FOR SUSPENSION INTRAMUSCULAR; INTRAVENOUS at 06:34

## 2017-03-31 RX ADMIN — SENNA PLUS 2 TABLET(S): 8.6 TABLET ORAL at 21:40

## 2017-03-31 RX ADMIN — Medication 20 MILLIGRAM(S): at 17:26

## 2017-03-31 RX ADMIN — Medication 100 MILLIGRAM(S): at 17:26

## 2017-03-31 RX ADMIN — Medication 100 MILLIGRAM(S): at 06:34

## 2017-03-31 RX ADMIN — OXYCODONE HYDROCHLORIDE 10 MILLIGRAM(S): 5 TABLET ORAL at 13:00

## 2017-03-31 RX ADMIN — ENOXAPARIN SODIUM 40 MILLIGRAM(S): 100 INJECTION SUBCUTANEOUS at 11:58

## 2017-03-31 NOTE — ADVANCED PRACTICE NURSE CONSULT - ASSESSMENT
Cycle 4, day 1/1,Height and weight verified. Lab results as per Md dez aware of same. Vital signs stable prior to chemotherapy, and  within accepectable parameters, see sunrise. Pt educated on the importance of saving urine, verbalizes good understanding. Pt education done re chemo regimen, drug effects and potential side effects, written , pt states understanding. Reports that he has been tolerating chemotherapy well without side effects. Pt withR PICC line  line, site free from signs and symptoms of infection, good blood return obtained. Pre- medicated with zofran 16 mg ivss emend 150 ivss via rapidly infusing ns line doxorubicin 25 mg/m2 =52 ivp positive blood return maintained throughout vinblastine 6 mg/m2=12.5 mg ivp via rapidly infusing ns ine positive blood return maintained throughout dacarbazine 375 mg/a5=386 mg infused over 1 hr w/o incident

## 2017-04-01 LAB
ANION GAP SERPL CALC-SCNC: 12 MMOL/L — SIGNIFICANT CHANGE UP (ref 5–17)
BUN SERPL-MCNC: 12 MG/DL — SIGNIFICANT CHANGE UP (ref 7–23)
CALCIUM SERPL-MCNC: 8.2 MG/DL — LOW (ref 8.4–10.5)
CHLORIDE SERPL-SCNC: 101 MMOL/L — SIGNIFICANT CHANGE UP (ref 96–108)
CO2 SERPL-SCNC: 26 MMOL/L — SIGNIFICANT CHANGE UP (ref 22–31)
CREAT SERPL-MCNC: 0.55 MG/DL — SIGNIFICANT CHANGE UP (ref 0.5–1.3)
GLUCOSE SERPL-MCNC: 74 MG/DL — SIGNIFICANT CHANGE UP (ref 70–99)
HCT VFR BLD CALC: 33.4 % — LOW (ref 39–50)
HGB BLD-MCNC: 10.5 G/DL — LOW (ref 13–17)
MCHC RBC-ENTMCNC: 24.9 PG — LOW (ref 27–34)
MCHC RBC-ENTMCNC: 31.4 GM/DL — LOW (ref 32–36)
MCV RBC AUTO: 79.3 FL — LOW (ref 80–100)
PLATELET # BLD AUTO: 388 K/UL — SIGNIFICANT CHANGE UP (ref 150–400)
POTASSIUM SERPL-MCNC: 4.2 MMOL/L — SIGNIFICANT CHANGE UP (ref 3.5–5.3)
POTASSIUM SERPL-SCNC: 4.2 MMOL/L — SIGNIFICANT CHANGE UP (ref 3.5–5.3)
RBC # BLD: 4.21 M/UL — SIGNIFICANT CHANGE UP (ref 4.2–5.8)
RBC # FLD: 16.8 % — HIGH (ref 10.3–14.5)
SODIUM SERPL-SCNC: 139 MMOL/L — SIGNIFICANT CHANGE UP (ref 135–145)
WBC # BLD: 4.51 K/UL — SIGNIFICANT CHANGE UP (ref 3.8–10.5)
WBC # FLD AUTO: 4.51 K/UL — SIGNIFICANT CHANGE UP (ref 3.8–10.5)

## 2017-04-01 PROCEDURE — 99233 SBSQ HOSP IP/OBS HIGH 50: CPT

## 2017-04-01 RX ORDER — ONDANSETRON 8 MG/1
4 TABLET, FILM COATED ORAL ONCE
Qty: 0 | Refills: 0 | Status: COMPLETED | OUTPATIENT
Start: 2017-04-01 | End: 2017-04-01

## 2017-04-01 RX ADMIN — AMPICILLIN SODIUM AND SULBACTAM SODIUM 200 GRAM(S): 250; 125 INJECTION, POWDER, FOR SUSPENSION INTRAMUSCULAR; INTRAVENOUS at 23:12

## 2017-04-01 RX ADMIN — OXYCODONE HYDROCHLORIDE 10 MILLIGRAM(S): 5 TABLET ORAL at 10:34

## 2017-04-01 RX ADMIN — AMPICILLIN SODIUM AND SULBACTAM SODIUM 200 GRAM(S): 250; 125 INJECTION, POWDER, FOR SUSPENSION INTRAMUSCULAR; INTRAVENOUS at 11:46

## 2017-04-01 RX ADMIN — AMPICILLIN SODIUM AND SULBACTAM SODIUM 200 GRAM(S): 250; 125 INJECTION, POWDER, FOR SUSPENSION INTRAMUSCULAR; INTRAVENOUS at 17:36

## 2017-04-01 RX ADMIN — SENNA PLUS 2 TABLET(S): 8.6 TABLET ORAL at 23:12

## 2017-04-01 RX ADMIN — Medication 25 MILLIGRAM(S): at 17:36

## 2017-04-01 RX ADMIN — ENOXAPARIN SODIUM 40 MILLIGRAM(S): 100 INJECTION SUBCUTANEOUS at 13:07

## 2017-04-01 RX ADMIN — Medication 3 MILLILITER(S): at 17:36

## 2017-04-01 RX ADMIN — Medication 3 MILLILITER(S): at 00:02

## 2017-04-01 RX ADMIN — Medication 100 MILLIGRAM(S): at 06:30

## 2017-04-01 RX ADMIN — Medication 3 MILLILITER(S): at 11:46

## 2017-04-01 RX ADMIN — Medication 3 MILLILITER(S): at 06:30

## 2017-04-01 RX ADMIN — AMPICILLIN SODIUM AND SULBACTAM SODIUM 200 GRAM(S): 250; 125 INJECTION, POWDER, FOR SUSPENSION INTRAMUSCULAR; INTRAVENOUS at 06:39

## 2017-04-01 RX ADMIN — ONDANSETRON 4 MILLIGRAM(S): 8 TABLET, FILM COATED ORAL at 23:12

## 2017-04-01 RX ADMIN — OXYCODONE HYDROCHLORIDE 10 MILLIGRAM(S): 5 TABLET ORAL at 18:12

## 2017-04-01 RX ADMIN — Medication 20 MILLIGRAM(S): at 17:36

## 2017-04-01 RX ADMIN — ONDANSETRON 4 MILLIGRAM(S): 8 TABLET, FILM COATED ORAL at 06:30

## 2017-04-01 RX ADMIN — POLYETHYLENE GLYCOL 3350 17 GRAM(S): 17 POWDER, FOR SOLUTION ORAL at 10:34

## 2017-04-01 RX ADMIN — OXYCODONE HYDROCHLORIDE 10 MILLIGRAM(S): 5 TABLET ORAL at 11:45

## 2017-04-01 RX ADMIN — Medication 20 MILLIGRAM(S): at 06:30

## 2017-04-01 RX ADMIN — Medication 3 MILLILITER(S): at 23:12

## 2017-04-01 RX ADMIN — OXYCODONE HYDROCHLORIDE 10 MILLIGRAM(S): 5 TABLET ORAL at 04:16

## 2017-04-01 RX ADMIN — AMPICILLIN SODIUM AND SULBACTAM SODIUM 200 GRAM(S): 250; 125 INJECTION, POWDER, FOR SUSPENSION INTRAMUSCULAR; INTRAVENOUS at 00:02

## 2017-04-01 RX ADMIN — Medication 25 MILLIGRAM(S): at 06:30

## 2017-04-01 RX ADMIN — OXYCODONE HYDROCHLORIDE 10 MILLIGRAM(S): 5 TABLET ORAL at 05:00

## 2017-04-01 RX ADMIN — OXYCODONE HYDROCHLORIDE 10 MILLIGRAM(S): 5 TABLET ORAL at 17:36

## 2017-04-02 LAB
ANION GAP SERPL CALC-SCNC: 15 MMOL/L — SIGNIFICANT CHANGE UP (ref 5–17)
BUN SERPL-MCNC: 12 MG/DL — SIGNIFICANT CHANGE UP (ref 7–23)
CALCIUM SERPL-MCNC: 8.4 MG/DL — SIGNIFICANT CHANGE UP (ref 8.4–10.5)
CHLORIDE SERPL-SCNC: 101 MMOL/L — SIGNIFICANT CHANGE UP (ref 96–108)
CO2 SERPL-SCNC: 23 MMOL/L — SIGNIFICANT CHANGE UP (ref 22–31)
CREAT SERPL-MCNC: 0.59 MG/DL — SIGNIFICANT CHANGE UP (ref 0.5–1.3)
GLUCOSE SERPL-MCNC: 79 MG/DL — SIGNIFICANT CHANGE UP (ref 70–99)
HCT VFR BLD CALC: 32.3 % — LOW (ref 39–50)
HGB BLD-MCNC: 10.1 G/DL — LOW (ref 13–17)
MCHC RBC-ENTMCNC: 24.7 PG — LOW (ref 27–34)
MCHC RBC-ENTMCNC: 31.3 GM/DL — LOW (ref 32–36)
MCV RBC AUTO: 79 FL — LOW (ref 80–100)
PLATELET # BLD AUTO: 353 K/UL — SIGNIFICANT CHANGE UP (ref 150–400)
POTASSIUM SERPL-MCNC: 4 MMOL/L — SIGNIFICANT CHANGE UP (ref 3.5–5.3)
POTASSIUM SERPL-SCNC: 4 MMOL/L — SIGNIFICANT CHANGE UP (ref 3.5–5.3)
RBC # BLD: 4.09 M/UL — LOW (ref 4.2–5.8)
RBC # FLD: 16.7 % — HIGH (ref 10.3–14.5)
SODIUM SERPL-SCNC: 139 MMOL/L — SIGNIFICANT CHANGE UP (ref 135–145)
URATE SERPL-MCNC: 3.7 MG/DL — SIGNIFICANT CHANGE UP (ref 3.4–8.8)
WBC # BLD: 3.05 K/UL — LOW (ref 3.8–10.5)
WBC # FLD AUTO: 3.05 K/UL — LOW (ref 3.8–10.5)

## 2017-04-02 PROCEDURE — 99233 SBSQ HOSP IP/OBS HIGH 50: CPT

## 2017-04-02 PROCEDURE — 99232 SBSQ HOSP IP/OBS MODERATE 35: CPT

## 2017-04-02 RX ADMIN — ENOXAPARIN SODIUM 40 MILLIGRAM(S): 100 INJECTION SUBCUTANEOUS at 13:27

## 2017-04-02 RX ADMIN — Medication 3 MILLILITER(S): at 06:33

## 2017-04-02 RX ADMIN — Medication 20 MILLIGRAM(S): at 17:27

## 2017-04-02 RX ADMIN — OXYCODONE HYDROCHLORIDE 10 MILLIGRAM(S): 5 TABLET ORAL at 07:14

## 2017-04-02 RX ADMIN — AMPICILLIN SODIUM AND SULBACTAM SODIUM 200 GRAM(S): 250; 125 INJECTION, POWDER, FOR SUSPENSION INTRAMUSCULAR; INTRAVENOUS at 11:43

## 2017-04-02 RX ADMIN — AMPICILLIN SODIUM AND SULBACTAM SODIUM 200 GRAM(S): 250; 125 INJECTION, POWDER, FOR SUSPENSION INTRAMUSCULAR; INTRAVENOUS at 06:34

## 2017-04-02 RX ADMIN — Medication 25 MILLIGRAM(S): at 17:27

## 2017-04-02 RX ADMIN — SENNA PLUS 2 TABLET(S): 8.6 TABLET ORAL at 22:41

## 2017-04-02 RX ADMIN — OXYCODONE HYDROCHLORIDE 10 MILLIGRAM(S): 5 TABLET ORAL at 06:36

## 2017-04-02 RX ADMIN — Medication 20 MILLIGRAM(S): at 06:34

## 2017-04-02 RX ADMIN — Medication 25 MILLIGRAM(S): at 06:33

## 2017-04-02 RX ADMIN — Medication 3 MILLILITER(S): at 17:27

## 2017-04-02 RX ADMIN — AMPICILLIN SODIUM AND SULBACTAM SODIUM 200 GRAM(S): 250; 125 INJECTION, POWDER, FOR SUSPENSION INTRAMUSCULAR; INTRAVENOUS at 22:42

## 2017-04-02 RX ADMIN — Medication 3 MILLILITER(S): at 11:44

## 2017-04-02 RX ADMIN — AMPICILLIN SODIUM AND SULBACTAM SODIUM 200 GRAM(S): 250; 125 INJECTION, POWDER, FOR SUSPENSION INTRAMUSCULAR; INTRAVENOUS at 17:27

## 2017-04-03 LAB
ALBUMIN SERPL ELPH-MCNC: 2.1 G/DL — LOW (ref 3.3–5)
ALP SERPL-CCNC: 88 U/L — SIGNIFICANT CHANGE UP (ref 40–120)
ALT FLD-CCNC: 14 U/L — SIGNIFICANT CHANGE UP (ref 10–45)
ANION GAP SERPL CALC-SCNC: 13 MMOL/L — SIGNIFICANT CHANGE UP (ref 5–17)
AST SERPL-CCNC: 11 U/L — SIGNIFICANT CHANGE UP (ref 10–40)
BILIRUB SERPL-MCNC: 0.2 MG/DL — SIGNIFICANT CHANGE UP (ref 0.2–1.2)
BUN SERPL-MCNC: 10 MG/DL — SIGNIFICANT CHANGE UP (ref 7–23)
CALCIUM SERPL-MCNC: 8.4 MG/DL — SIGNIFICANT CHANGE UP (ref 8.4–10.5)
CHLORIDE SERPL-SCNC: 105 MMOL/L — SIGNIFICANT CHANGE UP (ref 96–108)
CO2 SERPL-SCNC: 25 MMOL/L — SIGNIFICANT CHANGE UP (ref 22–31)
CREAT SERPL-MCNC: 0.49 MG/DL — LOW (ref 0.5–1.3)
CULTURE RESULTS: SIGNIFICANT CHANGE UP
GLUCOSE SERPL-MCNC: 84 MG/DL — SIGNIFICANT CHANGE UP (ref 70–99)
HCT VFR BLD CALC: 31.1 % — LOW (ref 39–50)
HGB BLD-MCNC: 9.7 G/DL — LOW (ref 13–17)
LDH SERPL L TO P-CCNC: 128 U/L — SIGNIFICANT CHANGE UP (ref 50–242)
MAGNESIUM SERPL-MCNC: 2 MG/DL — SIGNIFICANT CHANGE UP (ref 1.6–2.6)
MCHC RBC-ENTMCNC: 24.3 PG — LOW (ref 27–34)
MCHC RBC-ENTMCNC: 31.2 GM/DL — LOW (ref 32–36)
MCV RBC AUTO: 77.8 FL — LOW (ref 80–100)
NON-GYNECOLOGICAL CYTOLOGY STUDY: SIGNIFICANT CHANGE UP
PHOSPHATE SERPL-MCNC: 3.8 MG/DL — SIGNIFICANT CHANGE UP (ref 2.5–4.5)
PLATELET # BLD AUTO: 366 K/UL — SIGNIFICANT CHANGE UP (ref 150–400)
POTASSIUM SERPL-MCNC: 3.7 MMOL/L — SIGNIFICANT CHANGE UP (ref 3.5–5.3)
POTASSIUM SERPL-SCNC: 3.7 MMOL/L — SIGNIFICANT CHANGE UP (ref 3.5–5.3)
PROT SERPL-MCNC: 5 G/DL — LOW (ref 6–8.3)
RBC # BLD: 4 M/UL — LOW (ref 4.2–5.8)
RBC # FLD: 16.7 % — HIGH (ref 10.3–14.5)
SODIUM SERPL-SCNC: 143 MMOL/L — SIGNIFICANT CHANGE UP (ref 135–145)
SPECIMEN SOURCE: SIGNIFICANT CHANGE UP
TM INTERPRETATION: SIGNIFICANT CHANGE UP
WBC # BLD: 2.56 K/UL — LOW (ref 3.8–10.5)
WBC # FLD AUTO: 2.56 K/UL — LOW (ref 3.8–10.5)

## 2017-04-03 PROCEDURE — 99232 SBSQ HOSP IP/OBS MODERATE 35: CPT

## 2017-04-03 PROCEDURE — 99233 SBSQ HOSP IP/OBS HIGH 50: CPT

## 2017-04-03 RX ORDER — ONDANSETRON 8 MG/1
4 TABLET, FILM COATED ORAL EVERY 8 HOURS
Qty: 0 | Refills: 0 | Status: DISCONTINUED | OUTPATIENT
Start: 2017-04-03 | End: 2017-04-04

## 2017-04-03 RX ORDER — ONDANSETRON 8 MG/1
4 TABLET, FILM COATED ORAL ONCE
Qty: 0 | Refills: 0 | Status: COMPLETED | OUTPATIENT
Start: 2017-04-03 | End: 2017-04-03

## 2017-04-03 RX ADMIN — Medication 20 MILLIGRAM(S): at 17:19

## 2017-04-03 RX ADMIN — AMPICILLIN SODIUM AND SULBACTAM SODIUM 200 GRAM(S): 250; 125 INJECTION, POWDER, FOR SUSPENSION INTRAMUSCULAR; INTRAVENOUS at 13:16

## 2017-04-03 RX ADMIN — Medication 100 MILLIGRAM(S): at 17:19

## 2017-04-03 RX ADMIN — ONDANSETRON 4 MILLIGRAM(S): 8 TABLET, FILM COATED ORAL at 00:21

## 2017-04-03 RX ADMIN — AMPICILLIN SODIUM AND SULBACTAM SODIUM 200 GRAM(S): 250; 125 INJECTION, POWDER, FOR SUSPENSION INTRAMUSCULAR; INTRAVENOUS at 06:09

## 2017-04-03 RX ADMIN — Medication 3 MILLILITER(S): at 13:16

## 2017-04-03 RX ADMIN — POLYETHYLENE GLYCOL 3350 17 GRAM(S): 17 POWDER, FOR SOLUTION ORAL at 06:13

## 2017-04-03 RX ADMIN — Medication 3 MILLILITER(S): at 06:03

## 2017-04-03 RX ADMIN — ENOXAPARIN SODIUM 40 MILLIGRAM(S): 100 INJECTION SUBCUTANEOUS at 13:16

## 2017-04-03 RX ADMIN — Medication 100 MILLIGRAM(S): at 06:15

## 2017-04-03 RX ADMIN — Medication 20 MILLIGRAM(S): at 06:04

## 2017-04-03 RX ADMIN — Medication 3 MILLILITER(S): at 17:19

## 2017-04-03 RX ADMIN — SENNA PLUS 2 TABLET(S): 8.6 TABLET ORAL at 21:25

## 2017-04-03 RX ADMIN — Medication 25 MILLIGRAM(S): at 17:19

## 2017-04-03 RX ADMIN — Medication 5 MILLIGRAM(S): at 21:25

## 2017-04-03 RX ADMIN — Medication 25 MILLIGRAM(S): at 06:15

## 2017-04-03 RX ADMIN — AMPICILLIN SODIUM AND SULBACTAM SODIUM 200 GRAM(S): 250; 125 INJECTION, POWDER, FOR SUSPENSION INTRAMUSCULAR; INTRAVENOUS at 17:19

## 2017-04-04 VITALS
SYSTOLIC BLOOD PRESSURE: 121 MMHG | RESPIRATION RATE: 18 BRPM | TEMPERATURE: 98 F | OXYGEN SATURATION: 95 % | HEART RATE: 88 BPM | DIASTOLIC BLOOD PRESSURE: 74 MMHG

## 2017-04-04 LAB
ANION GAP SERPL CALC-SCNC: 11 MMOL/L — SIGNIFICANT CHANGE UP (ref 5–17)
BASOPHILS # BLD AUTO: 0.03 K/UL — SIGNIFICANT CHANGE UP (ref 0–0.2)
BASOPHILS NFR BLD AUTO: 1.1 % — SIGNIFICANT CHANGE UP (ref 0–2)
BUN SERPL-MCNC: 10 MG/DL — SIGNIFICANT CHANGE UP (ref 7–23)
CALCIUM SERPL-MCNC: 8.1 MG/DL — LOW (ref 8.4–10.5)
CHLORIDE SERPL-SCNC: 103 MMOL/L — SIGNIFICANT CHANGE UP (ref 96–108)
CO2 SERPL-SCNC: 27 MMOL/L — SIGNIFICANT CHANGE UP (ref 22–31)
CREAT SERPL-MCNC: 0.61 MG/DL — SIGNIFICANT CHANGE UP (ref 0.5–1.3)
EOSINOPHIL # BLD AUTO: 0.21 K/UL — SIGNIFICANT CHANGE UP (ref 0–0.5)
EOSINOPHIL NFR BLD AUTO: 7.5 % — HIGH (ref 0–6)
GLUCOSE SERPL-MCNC: 78 MG/DL — SIGNIFICANT CHANGE UP (ref 70–99)
HCT VFR BLD CALC: 31 % — LOW (ref 39–50)
HGB BLD-MCNC: 9.8 G/DL — LOW (ref 13–17)
IMM GRANULOCYTES NFR BLD AUTO: 0.7 % — SIGNIFICANT CHANGE UP (ref 0–1.5)
LYMPHOCYTES # BLD AUTO: 0.6 K/UL — LOW (ref 1–3.3)
LYMPHOCYTES # BLD AUTO: 21.4 % — SIGNIFICANT CHANGE UP (ref 13–44)
MCHC RBC-ENTMCNC: 24.9 PG — LOW (ref 27–34)
MCHC RBC-ENTMCNC: 31.6 GM/DL — LOW (ref 32–36)
MCV RBC AUTO: 78.7 FL — LOW (ref 80–100)
MONOCYTES # BLD AUTO: 0.13 K/UL — SIGNIFICANT CHANGE UP (ref 0–0.9)
MONOCYTES NFR BLD AUTO: 4.6 % — SIGNIFICANT CHANGE UP (ref 2–14)
NEUTROPHILS # BLD AUTO: 1.82 K/UL — SIGNIFICANT CHANGE UP (ref 1.8–7.4)
NEUTROPHILS NFR BLD AUTO: 64.7 % — SIGNIFICANT CHANGE UP (ref 43–77)
PLATELET # BLD AUTO: 358 K/UL — SIGNIFICANT CHANGE UP (ref 150–400)
POTASSIUM SERPL-MCNC: 3.9 MMOL/L — SIGNIFICANT CHANGE UP (ref 3.5–5.3)
POTASSIUM SERPL-SCNC: 3.9 MMOL/L — SIGNIFICANT CHANGE UP (ref 3.5–5.3)
RBC # BLD: 3.94 M/UL — LOW (ref 4.2–5.8)
RBC # FLD: 16.5 % — HIGH (ref 10.3–14.5)
SODIUM SERPL-SCNC: 141 MMOL/L — SIGNIFICANT CHANGE UP (ref 135–145)
WBC # BLD: 2.81 K/UL — LOW (ref 3.8–10.5)
WBC # FLD AUTO: 2.81 K/UL — LOW (ref 3.8–10.5)

## 2017-04-04 PROCEDURE — 87015 SPECIMEN INFECT AGNT CONCNTJ: CPT

## 2017-04-04 PROCEDURE — 84132 ASSAY OF SERUM POTASSIUM: CPT

## 2017-04-04 PROCEDURE — 97116 GAIT TRAINING THERAPY: CPT

## 2017-04-04 PROCEDURE — 83605 ASSAY OF LACTIC ACID: CPT

## 2017-04-04 PROCEDURE — 82435 ASSAY OF BLOOD CHLORIDE: CPT

## 2017-04-04 PROCEDURE — 85730 THROMBOPLASTIN TIME PARTIAL: CPT

## 2017-04-04 PROCEDURE — 84295 ASSAY OF SERUM SODIUM: CPT

## 2017-04-04 PROCEDURE — 97530 THERAPEUTIC ACTIVITIES: CPT

## 2017-04-04 PROCEDURE — 36600 WITHDRAWAL OF ARTERIAL BLOOD: CPT

## 2017-04-04 PROCEDURE — 87102 FUNGUS ISOLATION CULTURE: CPT

## 2017-04-04 PROCEDURE — 86850 RBC ANTIBODY SCREEN: CPT

## 2017-04-04 PROCEDURE — 93306 TTE W/DOPPLER COMPLETE: CPT

## 2017-04-04 PROCEDURE — 82945 GLUCOSE OTHER FLUID: CPT

## 2017-04-04 PROCEDURE — 88305 TISSUE EXAM BY PATHOLOGIST: CPT

## 2017-04-04 PROCEDURE — 80053 COMPREHEN METABOLIC PANEL: CPT

## 2017-04-04 PROCEDURE — 97162 PT EVAL MOD COMPLEX 30 MIN: CPT

## 2017-04-04 PROCEDURE — 86901 BLOOD TYPING SEROLOGIC RH(D): CPT

## 2017-04-04 PROCEDURE — 71250 CT THORAX DX C-: CPT

## 2017-04-04 PROCEDURE — 89051 BODY FLUID CELL COUNT: CPT

## 2017-04-04 PROCEDURE — 88185 FLOWCYTOMETRY/TC ADD-ON: CPT

## 2017-04-04 PROCEDURE — 80048 BASIC METABOLIC PNL TOTAL CA: CPT

## 2017-04-04 PROCEDURE — 32557 INSERT CATH PLEURA W/ IMAGE: CPT

## 2017-04-04 PROCEDURE — 85027 COMPLETE CBC AUTOMATED: CPT

## 2017-04-04 PROCEDURE — 88112 CYTOPATH CELL ENHANCE TECH: CPT

## 2017-04-04 PROCEDURE — 99285 EMERGENCY DEPT VISIT HI MDM: CPT | Mod: 25

## 2017-04-04 PROCEDURE — 94640 AIRWAY INHALATION TREATMENT: CPT

## 2017-04-04 PROCEDURE — 87206 SMEAR FLUORESCENT/ACID STAI: CPT

## 2017-04-04 PROCEDURE — C1889: CPT

## 2017-04-04 PROCEDURE — 87116 MYCOBACTERIA CULTURE: CPT

## 2017-04-04 PROCEDURE — 84157 ASSAY OF PROTEIN OTHER: CPT

## 2017-04-04 PROCEDURE — 82803 BLOOD GASES ANY COMBINATION: CPT

## 2017-04-04 PROCEDURE — 82947 ASSAY GLUCOSE BLOOD QUANT: CPT

## 2017-04-04 PROCEDURE — 87040 BLOOD CULTURE FOR BACTERIA: CPT

## 2017-04-04 PROCEDURE — 83735 ASSAY OF MAGNESIUM: CPT

## 2017-04-04 PROCEDURE — 84550 ASSAY OF BLOOD/URIC ACID: CPT

## 2017-04-04 PROCEDURE — 71045 X-RAY EXAM CHEST 1 VIEW: CPT

## 2017-04-04 PROCEDURE — 99232 SBSQ HOSP IP/OBS MODERATE 35: CPT

## 2017-04-04 PROCEDURE — 83615 LACTATE (LD) (LDH) ENZYME: CPT

## 2017-04-04 PROCEDURE — 82330 ASSAY OF CALCIUM: CPT

## 2017-04-04 PROCEDURE — 87184 SC STD DISK METHOD PER PLATE: CPT

## 2017-04-04 PROCEDURE — 49423 EXCHANGE DRAINAGE CATHETER: CPT

## 2017-04-04 PROCEDURE — 71046 X-RAY EXAM CHEST 2 VIEWS: CPT

## 2017-04-04 PROCEDURE — 86900 BLOOD TYPING SEROLOGIC ABO: CPT

## 2017-04-04 PROCEDURE — A9560: CPT

## 2017-04-04 PROCEDURE — 82042 OTHER SOURCE ALBUMIN QUAN EA: CPT

## 2017-04-04 PROCEDURE — 88331 PATH CONSLTJ SURG 1 BLK 1SPC: CPT

## 2017-04-04 PROCEDURE — 88184 FLOWCYTOMETRY/ TC 1 MARKER: CPT

## 2017-04-04 PROCEDURE — 78472 GATED HEART PLANAR SINGLE: CPT

## 2017-04-04 PROCEDURE — 87205 SMEAR GRAM STAIN: CPT

## 2017-04-04 PROCEDURE — 85610 PROTHROMBIN TIME: CPT

## 2017-04-04 PROCEDURE — C1769: CPT

## 2017-04-04 PROCEDURE — 84100 ASSAY OF PHOSPHORUS: CPT

## 2017-04-04 PROCEDURE — C1729: CPT

## 2017-04-04 PROCEDURE — 32555 ASPIRATE PLEURA W/ IMAGING: CPT

## 2017-04-04 PROCEDURE — 87075 CULTR BACTERIA EXCEPT BLOOD: CPT

## 2017-04-04 PROCEDURE — 88312 SPECIAL STAINS GROUP 1: CPT

## 2017-04-04 PROCEDURE — 75984 XRAY CONTROL CATHETER CHANGE: CPT

## 2017-04-04 PROCEDURE — 83986 ASSAY PH BODY FLUID NOS: CPT

## 2017-04-04 PROCEDURE — 87070 CULTURE OTHR SPECIMN AEROBIC: CPT

## 2017-04-04 PROCEDURE — 85014 HEMATOCRIT: CPT

## 2017-04-04 RX ORDER — FUROSEMIDE 40 MG
1 TABLET ORAL
Qty: 60 | Refills: 0 | OUTPATIENT
Start: 2017-04-04 | End: 2017-05-04

## 2017-04-04 RX ORDER — FLUTICASONE PROPIONATE AND SALMETEROL 50; 250 UG/1; UG/1
1 POWDER ORAL; RESPIRATORY (INHALATION)
Qty: 1 | Refills: 0 | OUTPATIENT
Start: 2017-04-04 | End: 2017-05-04

## 2017-04-04 RX ORDER — SENNA PLUS 8.6 MG/1
2 TABLET ORAL
Qty: 0 | Refills: 0 | COMMUNITY
Start: 2017-04-04

## 2017-04-04 RX ORDER — DOCUSATE SODIUM 100 MG
1 CAPSULE ORAL
Qty: 0 | Refills: 0 | COMMUNITY
Start: 2017-04-04

## 2017-04-04 RX ORDER — BENZOCAINE AND MENTHOL 5; 1 G/100ML; G/100ML
1 LIQUID ORAL
Qty: 90 | Refills: 0 | OUTPATIENT
Start: 2017-04-04 | End: 2017-05-04

## 2017-04-04 RX ORDER — CALCIUM GLUCONATE 100 MG/ML
2 VIAL (ML) INTRAVENOUS ONCE
Qty: 0 | Refills: 0 | Status: COMPLETED | OUTPATIENT
Start: 2017-04-04 | End: 2017-04-04

## 2017-04-04 RX ORDER — OXYCODONE HYDROCHLORIDE 5 MG/1
1 TABLET ORAL
Qty: 30 | Refills: 0 | OUTPATIENT
Start: 2017-04-04 | End: 2017-04-09

## 2017-04-04 RX ORDER — POLYETHYLENE GLYCOL 3350 17 G/17G
17 POWDER, FOR SOLUTION ORAL
Qty: 0 | Refills: 0 | COMMUNITY
Start: 2017-04-04

## 2017-04-04 RX ORDER — METOPROLOL TARTRATE 50 MG
1 TABLET ORAL
Qty: 60 | Refills: 0 | OUTPATIENT
Start: 2017-04-04 | End: 2017-05-04

## 2017-04-04 RX ORDER — ALLOPURINOL 300 MG
1 TABLET ORAL
Qty: 30 | Refills: 0 | OUTPATIENT

## 2017-04-04 RX ADMIN — ONDANSETRON 4 MILLIGRAM(S): 8 TABLET, FILM COATED ORAL at 01:00

## 2017-04-04 RX ADMIN — Medication 25 MILLIGRAM(S): at 17:09

## 2017-04-04 RX ADMIN — Medication 20 MILLIGRAM(S): at 06:39

## 2017-04-04 RX ADMIN — Medication 3 MILLILITER(S): at 17:09

## 2017-04-04 RX ADMIN — Medication 20 MILLIGRAM(S): at 17:09

## 2017-04-04 RX ADMIN — AMPICILLIN SODIUM AND SULBACTAM SODIUM 200 GRAM(S): 250; 125 INJECTION, POWDER, FOR SUSPENSION INTRAMUSCULAR; INTRAVENOUS at 17:09

## 2017-04-04 RX ADMIN — AMPICILLIN SODIUM AND SULBACTAM SODIUM 200 GRAM(S): 250; 125 INJECTION, POWDER, FOR SUSPENSION INTRAMUSCULAR; INTRAVENOUS at 12:47

## 2017-04-04 RX ADMIN — Medication 200 GRAM(S): at 13:20

## 2017-04-04 RX ADMIN — AMPICILLIN SODIUM AND SULBACTAM SODIUM 200 GRAM(S): 250; 125 INJECTION, POWDER, FOR SUSPENSION INTRAMUSCULAR; INTRAVENOUS at 01:02

## 2017-04-04 RX ADMIN — Medication 25 MILLIGRAM(S): at 06:48

## 2017-04-04 RX ADMIN — ONDANSETRON 4 MILLIGRAM(S): 8 TABLET, FILM COATED ORAL at 12:47

## 2017-04-04 RX ADMIN — AMPICILLIN SODIUM AND SULBACTAM SODIUM 200 GRAM(S): 250; 125 INJECTION, POWDER, FOR SUSPENSION INTRAMUSCULAR; INTRAVENOUS at 06:37

## 2017-04-13 ENCOUNTER — RESULT REVIEW (OUTPATIENT)
Age: 37
End: 2017-04-13

## 2017-04-14 ENCOUNTER — APPOINTMENT (OUTPATIENT)
Dept: INFUSION THERAPY | Facility: HOSPITAL | Age: 37
End: 2017-04-14

## 2017-04-14 ENCOUNTER — LABORATORY RESULT (OUTPATIENT)
Age: 37
End: 2017-04-14

## 2017-04-14 LAB
BASOPHILS # BLD AUTO: 0 K/UL — SIGNIFICANT CHANGE UP (ref 0–0.2)
BASOPHILS NFR BLD AUTO: 0.2 % — SIGNIFICANT CHANGE UP (ref 0–2)
EOSINOPHIL # BLD AUTO: 0.3 K/UL — SIGNIFICANT CHANGE UP (ref 0–0.5)
EOSINOPHIL NFR BLD AUTO: 6.8 % — HIGH (ref 0–6)
HCT VFR BLD CALC: 37.2 % — LOW (ref 39–50)
HGB BLD-MCNC: 12.1 G/DL — LOW (ref 13–17)
LYMPHOCYTES # BLD AUTO: 0.9 K/UL — LOW (ref 1–3.3)
LYMPHOCYTES # BLD AUTO: 18.7 % — SIGNIFICANT CHANGE UP (ref 13–44)
MCHC RBC-ENTMCNC: 25.6 PG — LOW (ref 27–34)
MCHC RBC-ENTMCNC: 32.6 G/DL — SIGNIFICANT CHANGE UP (ref 32–36)
MCV RBC AUTO: 78.5 FL — LOW (ref 80–100)
MONOCYTES # BLD AUTO: 0.6 K/UL — SIGNIFICANT CHANGE UP (ref 0–0.9)
MONOCYTES NFR BLD AUTO: 13.6 % — SIGNIFICANT CHANGE UP (ref 2–14)
NEUTROPHILS # BLD AUTO: 2.8 K/UL — SIGNIFICANT CHANGE UP (ref 1.8–7.4)
NEUTROPHILS NFR BLD AUTO: 60.8 % — SIGNIFICANT CHANGE UP (ref 43–77)
PLATELET # BLD AUTO: 439 K/UL — HIGH (ref 150–400)
RBC # BLD: 4.74 M/UL — SIGNIFICANT CHANGE UP (ref 4.2–5.8)
RBC # FLD: 17.4 % — HIGH (ref 10.3–14.5)
WBC # BLD: 4.6 K/UL — SIGNIFICANT CHANGE UP (ref 3.8–10.5)
WBC # FLD AUTO: 4.6 K/UL — SIGNIFICANT CHANGE UP (ref 3.8–10.5)

## 2017-04-17 DIAGNOSIS — Z51.11 ENCOUNTER FOR ANTINEOPLASTIC CHEMOTHERAPY: ICD-10-CM

## 2017-04-17 DIAGNOSIS — R11.2 NAUSEA WITH VOMITING, UNSPECIFIED: ICD-10-CM

## 2017-04-19 ENCOUNTER — OUTPATIENT (OUTPATIENT)
Dept: OUTPATIENT SERVICES | Facility: HOSPITAL | Age: 37
LOS: 1 days | End: 2017-04-19
Payer: COMMERCIAL

## 2017-04-19 ENCOUNTER — APPOINTMENT (OUTPATIENT)
Dept: RADIOLOGY | Facility: IMAGING CENTER | Age: 37
End: 2017-04-19

## 2017-04-19 DIAGNOSIS — Z00.8 ENCOUNTER FOR OTHER GENERAL EXAMINATION: ICD-10-CM

## 2017-04-19 PROCEDURE — 71046 X-RAY EXAM CHEST 2 VIEWS: CPT

## 2017-04-20 ENCOUNTER — APPOINTMENT (OUTPATIENT)
Dept: THORACIC SURGERY | Facility: CLINIC | Age: 37
End: 2017-04-20

## 2017-04-20 VITALS
SYSTOLIC BLOOD PRESSURE: 130 MMHG | BODY MASS INDEX: 26.04 KG/M2 | HEIGHT: 71 IN | DIASTOLIC BLOOD PRESSURE: 78 MMHG | OXYGEN SATURATION: 98 % | WEIGHT: 186 LBS | HEART RATE: 97 BPM | RESPIRATION RATE: 16 BRPM

## 2017-04-20 RX ORDER — OSELTAMIVIR PHOSPHATE 75 MG/1
75 CAPSULE ORAL
Qty: 7 | Refills: 0 | Status: COMPLETED | COMMUNITY
Start: 2017-02-24 | End: 2017-04-20

## 2017-04-20 RX ORDER — LEVOFLOXACIN 500 MG/1
500 TABLET, FILM COATED ORAL
Qty: 4 | Refills: 0 | Status: COMPLETED | COMMUNITY
Start: 2016-11-19

## 2017-04-20 RX ORDER — AZITHROMYCIN 250 MG/1
250 TABLET, FILM COATED ORAL
Qty: 6 | Refills: 0 | Status: COMPLETED | COMMUNITY
Start: 2017-02-24 | End: 2017-04-20

## 2017-04-25 ENCOUNTER — OUTPATIENT (OUTPATIENT)
Dept: OUTPATIENT SERVICES | Facility: HOSPITAL | Age: 37
LOS: 1 days | Discharge: HOME | End: 2017-04-25

## 2017-04-25 DIAGNOSIS — C81.90 HODGKIN LYMPHOMA, UNSPECIFIED, UNSPECIFIED SITE: ICD-10-CM

## 2017-04-27 ENCOUNTER — RESULT REVIEW (OUTPATIENT)
Age: 37
End: 2017-04-27

## 2017-04-28 ENCOUNTER — LABORATORY RESULT (OUTPATIENT)
Age: 37
End: 2017-04-28

## 2017-04-28 ENCOUNTER — APPOINTMENT (OUTPATIENT)
Dept: INFUSION THERAPY | Facility: HOSPITAL | Age: 37
End: 2017-04-28

## 2017-04-28 LAB
BASOPHILS # BLD AUTO: 0 K/UL — SIGNIFICANT CHANGE UP (ref 0–0.2)
BASOPHILS NFR BLD AUTO: 0.6 % — SIGNIFICANT CHANGE UP (ref 0–2)
EOSINOPHIL # BLD AUTO: 0.3 K/UL — SIGNIFICANT CHANGE UP (ref 0–0.5)
EOSINOPHIL NFR BLD AUTO: 8.2 % — HIGH (ref 0–6)
HCT VFR BLD CALC: 36.7 % — LOW (ref 39–50)
HGB BLD-MCNC: 12.3 G/DL — LOW (ref 13–17)
LYMPHOCYTES # BLD AUTO: 0.8 K/UL — LOW (ref 1–3.3)
LYMPHOCYTES # BLD AUTO: 22 % — SIGNIFICANT CHANGE UP (ref 13–44)
MCHC RBC-ENTMCNC: 26.8 PG — LOW (ref 27–34)
MCHC RBC-ENTMCNC: 33.6 G/DL — SIGNIFICANT CHANGE UP (ref 32–36)
MCV RBC AUTO: 79.6 FL — LOW (ref 80–100)
MONOCYTES # BLD AUTO: 0.4 K/UL — SIGNIFICANT CHANGE UP (ref 0–0.9)
MONOCYTES NFR BLD AUTO: 10.2 % — SIGNIFICANT CHANGE UP (ref 2–14)
NEUTROPHILS # BLD AUTO: 2.2 K/UL — SIGNIFICANT CHANGE UP (ref 1.8–7.4)
NEUTROPHILS NFR BLD AUTO: 59 % — SIGNIFICANT CHANGE UP (ref 43–77)
PLATELET # BLD AUTO: 260 K/UL — SIGNIFICANT CHANGE UP (ref 150–400)
RBC # BLD: 4.6 M/UL — SIGNIFICANT CHANGE UP (ref 4.2–5.8)
RBC # FLD: 18.8 % — HIGH (ref 10.3–14.5)
WBC # BLD: 3.6 K/UL — LOW (ref 3.8–10.5)
WBC # FLD AUTO: 3.6 K/UL — LOW (ref 3.8–10.5)

## 2017-04-29 LAB
CULTURE RESULTS: SIGNIFICANT CHANGE UP
SPECIMEN SOURCE: SIGNIFICANT CHANGE UP

## 2017-05-01 DIAGNOSIS — R11.2 NAUSEA WITH VOMITING, UNSPECIFIED: ICD-10-CM

## 2017-05-01 DIAGNOSIS — Z51.11 ENCOUNTER FOR ANTINEOPLASTIC CHEMOTHERAPY: ICD-10-CM

## 2017-05-08 ENCOUNTER — APPOINTMENT (OUTPATIENT)
Dept: THORACIC SURGERY | Facility: CLINIC | Age: 37
End: 2017-05-08

## 2017-05-08 ENCOUNTER — APPOINTMENT (OUTPATIENT)
Dept: NUCLEAR MEDICINE | Facility: IMAGING CENTER | Age: 37
End: 2017-05-08

## 2017-05-10 ENCOUNTER — OTHER (OUTPATIENT)
Age: 37
End: 2017-05-10

## 2017-05-12 ENCOUNTER — APPOINTMENT (OUTPATIENT)
Dept: INFUSION THERAPY | Facility: HOSPITAL | Age: 37
End: 2017-05-12

## 2017-05-12 ENCOUNTER — RESULT REVIEW (OUTPATIENT)
Age: 37
End: 2017-05-12

## 2017-05-12 ENCOUNTER — LABORATORY RESULT (OUTPATIENT)
Age: 37
End: 2017-05-12

## 2017-05-12 LAB
BASOPHILS # BLD AUTO: 0 K/UL — SIGNIFICANT CHANGE UP (ref 0–0.2)
BASOPHILS NFR BLD AUTO: 1 % — SIGNIFICANT CHANGE UP (ref 0–2)
EOSINOPHIL # BLD AUTO: 0.2 K/UL — SIGNIFICANT CHANGE UP (ref 0–0.5)
EOSINOPHIL NFR BLD AUTO: 5.7 % — SIGNIFICANT CHANGE UP (ref 0–6)
HCT VFR BLD CALC: 38.4 % — LOW (ref 39–50)
HGB BLD-MCNC: 12.8 G/DL — LOW (ref 13–17)
LYMPHOCYTES # BLD AUTO: 0.8 K/UL — LOW (ref 1–3.3)
LYMPHOCYTES # BLD AUTO: 20.4 % — SIGNIFICANT CHANGE UP (ref 13–44)
MCHC RBC-ENTMCNC: 27 PG — SIGNIFICANT CHANGE UP (ref 27–34)
MCHC RBC-ENTMCNC: 33.2 G/DL — SIGNIFICANT CHANGE UP (ref 32–36)
MCV RBC AUTO: 81.2 FL — SIGNIFICANT CHANGE UP (ref 80–100)
MONOCYTES # BLD AUTO: 0.6 K/UL — SIGNIFICANT CHANGE UP (ref 0–0.9)
MONOCYTES NFR BLD AUTO: 15.8 % — HIGH (ref 2–14)
NEUTROPHILS # BLD AUTO: 2.2 K/UL — SIGNIFICANT CHANGE UP (ref 1.8–7.4)
NEUTROPHILS NFR BLD AUTO: 57.1 % — SIGNIFICANT CHANGE UP (ref 43–77)
PLATELET # BLD AUTO: 256 K/UL — SIGNIFICANT CHANGE UP (ref 150–400)
RBC # BLD: 4.73 M/UL — SIGNIFICANT CHANGE UP (ref 4.2–5.8)
RBC # FLD: 18.6 % — HIGH (ref 10.3–14.5)
WBC # BLD: 3.8 K/UL — SIGNIFICANT CHANGE UP (ref 3.8–10.5)
WBC # FLD AUTO: 3.8 K/UL — SIGNIFICANT CHANGE UP (ref 3.8–10.5)

## 2017-05-13 LAB
CULTURE RESULTS: SIGNIFICANT CHANGE UP
SPECIMEN SOURCE: SIGNIFICANT CHANGE UP

## 2017-05-15 ENCOUNTER — APPOINTMENT (OUTPATIENT)
Dept: HEMATOLOGY ONCOLOGY | Facility: CLINIC | Age: 37
End: 2017-05-15

## 2017-05-26 ENCOUNTER — LABORATORY RESULT (OUTPATIENT)
Age: 37
End: 2017-05-26

## 2017-05-26 ENCOUNTER — RESULT REVIEW (OUTPATIENT)
Age: 37
End: 2017-05-26

## 2017-05-26 ENCOUNTER — APPOINTMENT (OUTPATIENT)
Dept: INFUSION THERAPY | Facility: HOSPITAL | Age: 37
End: 2017-05-26

## 2017-05-26 ENCOUNTER — OUTPATIENT (OUTPATIENT)
Dept: OUTPATIENT SERVICES | Facility: HOSPITAL | Age: 37
LOS: 1 days | Discharge: HOME | End: 2017-05-26

## 2017-05-26 DIAGNOSIS — C81.90 HODGKIN LYMPHOMA, UNSPECIFIED, UNSPECIFIED SITE: ICD-10-CM

## 2017-05-26 LAB
BASOPHILS # BLD AUTO: 0.1 K/UL — SIGNIFICANT CHANGE UP (ref 0–0.2)
BASOPHILS NFR BLD AUTO: 1.5 % — SIGNIFICANT CHANGE UP (ref 0–2)
EOSINOPHIL # BLD AUTO: 0.3 K/UL — SIGNIFICANT CHANGE UP (ref 0–0.5)
EOSINOPHIL NFR BLD AUTO: 5.3 % — SIGNIFICANT CHANGE UP (ref 0–6)
HCT VFR BLD CALC: 42.2 % — SIGNIFICANT CHANGE UP (ref 39–50)
HGB BLD-MCNC: 14.3 G/DL — SIGNIFICANT CHANGE UP (ref 13–17)
LYMPHOCYTES # BLD AUTO: 1 K/UL — SIGNIFICANT CHANGE UP (ref 1–3.3)
LYMPHOCYTES # BLD AUTO: 20.9 % — SIGNIFICANT CHANGE UP (ref 13–44)
MCHC RBC-ENTMCNC: 27.5 PG — SIGNIFICANT CHANGE UP (ref 27–34)
MCHC RBC-ENTMCNC: 33.8 G/DL — SIGNIFICANT CHANGE UP (ref 32–36)
MCV RBC AUTO: 81.3 FL — SIGNIFICANT CHANGE UP (ref 80–100)
MONOCYTES # BLD AUTO: 0.6 K/UL — SIGNIFICANT CHANGE UP (ref 0–0.9)
MONOCYTES NFR BLD AUTO: 11.6 % — SIGNIFICANT CHANGE UP (ref 2–14)
NEUTROPHILS # BLD AUTO: 2.9 K/UL — SIGNIFICANT CHANGE UP (ref 1.8–7.4)
NEUTROPHILS NFR BLD AUTO: 60.7 % — SIGNIFICANT CHANGE UP (ref 43–77)
PLATELET # BLD AUTO: 281 K/UL — SIGNIFICANT CHANGE UP (ref 150–400)
RBC # BLD: 5.19 M/UL — SIGNIFICANT CHANGE UP (ref 4.2–5.8)
RBC # FLD: 17.9 % — HIGH (ref 10.3–14.5)
WBC # BLD: 4.8 K/UL — SIGNIFICANT CHANGE UP (ref 3.8–10.5)
WBC # FLD AUTO: 4.8 K/UL — SIGNIFICANT CHANGE UP (ref 3.8–10.5)

## 2017-05-30 DIAGNOSIS — R11.2 NAUSEA WITH VOMITING, UNSPECIFIED: ICD-10-CM

## 2017-05-30 DIAGNOSIS — Z51.11 ENCOUNTER FOR ANTINEOPLASTIC CHEMOTHERAPY: ICD-10-CM

## 2017-06-09 ENCOUNTER — RESULT REVIEW (OUTPATIENT)
Age: 37
End: 2017-06-09

## 2017-06-09 ENCOUNTER — LABORATORY RESULT (OUTPATIENT)
Age: 37
End: 2017-06-09

## 2017-06-09 ENCOUNTER — APPOINTMENT (OUTPATIENT)
Dept: INFUSION THERAPY | Facility: HOSPITAL | Age: 37
End: 2017-06-09

## 2017-06-09 LAB
BASOPHILS # BLD AUTO: 0.1 K/UL — SIGNIFICANT CHANGE UP (ref 0–0.2)
BASOPHILS NFR BLD AUTO: 1.5 % — SIGNIFICANT CHANGE UP (ref 0–2)
EOSINOPHIL # BLD AUTO: 0.4 K/UL — SIGNIFICANT CHANGE UP (ref 0–0.5)
EOSINOPHIL NFR BLD AUTO: 10.6 % — HIGH (ref 0–6)
HCT VFR BLD CALC: 38.9 % — LOW (ref 39–50)
HGB BLD-MCNC: 13.5 G/DL — SIGNIFICANT CHANGE UP (ref 13–17)
LYMPHOCYTES # BLD AUTO: 0.7 K/UL — LOW (ref 1–3.3)
LYMPHOCYTES # BLD AUTO: 18.8 % — SIGNIFICANT CHANGE UP (ref 13–44)
MCHC RBC-ENTMCNC: 28.2 PG — SIGNIFICANT CHANGE UP (ref 27–34)
MCHC RBC-ENTMCNC: 34.5 G/DL — SIGNIFICANT CHANGE UP (ref 32–36)
MCV RBC AUTO: 81.5 FL — SIGNIFICANT CHANGE UP (ref 80–100)
MONOCYTES # BLD AUTO: 0.5 K/UL — SIGNIFICANT CHANGE UP (ref 0–0.9)
MONOCYTES NFR BLD AUTO: 13.4 % — SIGNIFICANT CHANGE UP (ref 2–14)
NEUTROPHILS # BLD AUTO: 2.1 K/UL — SIGNIFICANT CHANGE UP (ref 1.8–7.4)
NEUTROPHILS NFR BLD AUTO: 55.7 % — SIGNIFICANT CHANGE UP (ref 43–77)
PLATELET # BLD AUTO: 238 K/UL — SIGNIFICANT CHANGE UP (ref 150–400)
RBC # BLD: 4.78 M/UL — SIGNIFICANT CHANGE UP (ref 4.2–5.8)
RBC # FLD: 17.7 % — HIGH (ref 10.3–14.5)
WBC # BLD: 3.8 K/UL — SIGNIFICANT CHANGE UP (ref 3.8–10.5)
WBC # FLD AUTO: 3.8 K/UL — SIGNIFICANT CHANGE UP (ref 3.8–10.5)

## 2017-06-16 ENCOUNTER — APPOINTMENT (OUTPATIENT)
Dept: NUCLEAR MEDICINE | Facility: IMAGING CENTER | Age: 37
End: 2017-06-16

## 2017-06-16 ENCOUNTER — OUTPATIENT (OUTPATIENT)
Dept: OUTPATIENT SERVICES | Facility: HOSPITAL | Age: 37
LOS: 1 days | End: 2017-06-16
Payer: COMMERCIAL

## 2017-06-16 ENCOUNTER — APPOINTMENT (OUTPATIENT)
Dept: RADIOLOGY | Facility: IMAGING CENTER | Age: 37
End: 2017-06-16

## 2017-06-16 DIAGNOSIS — C81.90 HODGKIN LYMPHOMA, UNSPECIFIED, UNSPECIFIED SITE: ICD-10-CM

## 2017-06-16 PROCEDURE — 78815 PET IMAGE W/CT SKULL-THIGH: CPT

## 2017-06-16 PROCEDURE — A9552: CPT

## 2017-06-16 PROCEDURE — 71046 X-RAY EXAM CHEST 2 VIEWS: CPT

## 2017-06-21 DIAGNOSIS — C85.90 NON-HODGKIN LYMPHOMA, UNSPECIFIED, UNSPECIFIED SITE: ICD-10-CM

## 2017-06-21 DIAGNOSIS — J90 PLEURAL EFFUSION, NOT ELSEWHERE CLASSIFIED: ICD-10-CM

## 2017-06-23 ENCOUNTER — APPOINTMENT (OUTPATIENT)
Dept: INFUSION THERAPY | Facility: HOSPITAL | Age: 37
End: 2017-06-23

## 2017-06-23 ENCOUNTER — LABORATORY RESULT (OUTPATIENT)
Age: 37
End: 2017-06-23

## 2017-06-23 ENCOUNTER — RESULT REVIEW (OUTPATIENT)
Age: 37
End: 2017-06-23

## 2017-06-23 LAB
BASOPHILS # BLD AUTO: 0 K/UL — SIGNIFICANT CHANGE UP (ref 0–0.2)
BASOPHILS NFR BLD AUTO: 0.8 % — SIGNIFICANT CHANGE UP (ref 0–2)
EOSINOPHIL # BLD AUTO: 0.4 K/UL — SIGNIFICANT CHANGE UP (ref 0–0.5)
EOSINOPHIL NFR BLD AUTO: 9.2 % — HIGH (ref 0–6)
HCT VFR BLD CALC: 40.1 % — SIGNIFICANT CHANGE UP (ref 39–50)
HGB BLD-MCNC: 13.7 G/DL — SIGNIFICANT CHANGE UP (ref 13–17)
LYMPHOCYTES # BLD AUTO: 0.9 K/UL — LOW (ref 1–3.3)
LYMPHOCYTES # BLD AUTO: 21.5 % — SIGNIFICANT CHANGE UP (ref 13–44)
MCHC RBC-ENTMCNC: 28.2 PG — SIGNIFICANT CHANGE UP (ref 27–34)
MCHC RBC-ENTMCNC: 34.2 G/DL — SIGNIFICANT CHANGE UP (ref 32–36)
MCV RBC AUTO: 82.6 FL — SIGNIFICANT CHANGE UP (ref 80–100)
MONOCYTES # BLD AUTO: 0.5 K/UL — SIGNIFICANT CHANGE UP (ref 0–0.9)
MONOCYTES NFR BLD AUTO: 13.6 % — SIGNIFICANT CHANGE UP (ref 2–14)
NEUTROPHILS # BLD AUTO: 2.2 K/UL — SIGNIFICANT CHANGE UP (ref 1.8–7.4)
NEUTROPHILS NFR BLD AUTO: 55 % — SIGNIFICANT CHANGE UP (ref 43–77)
PLATELET # BLD AUTO: 234 K/UL — SIGNIFICANT CHANGE UP (ref 150–400)
RBC # BLD: 4.85 M/UL — SIGNIFICANT CHANGE UP (ref 4.2–5.8)
RBC # FLD: 17.3 % — HIGH (ref 10.3–14.5)
WBC # BLD: 4 K/UL — SIGNIFICANT CHANGE UP (ref 3.8–10.5)
WBC # FLD AUTO: 4 K/UL — SIGNIFICANT CHANGE UP (ref 3.8–10.5)

## 2017-06-30 ENCOUNTER — OUTPATIENT (OUTPATIENT)
Dept: OUTPATIENT SERVICES | Facility: HOSPITAL | Age: 37
LOS: 1 days | Discharge: HOME | End: 2017-06-30

## 2017-06-30 DIAGNOSIS — C81.90 HODGKIN LYMPHOMA, UNSPECIFIED, UNSPECIFIED SITE: ICD-10-CM

## 2017-07-07 ENCOUNTER — LABORATORY RESULT (OUTPATIENT)
Age: 37
End: 2017-07-07

## 2017-07-07 ENCOUNTER — RESULT REVIEW (OUTPATIENT)
Age: 37
End: 2017-07-07

## 2017-07-07 ENCOUNTER — APPOINTMENT (OUTPATIENT)
Dept: INFUSION THERAPY | Facility: HOSPITAL | Age: 37
End: 2017-07-07

## 2017-07-07 LAB
BASOPHILS # BLD AUTO: 0 K/UL — SIGNIFICANT CHANGE UP (ref 0–0.2)
BASOPHILS NFR BLD AUTO: 1.3 % — SIGNIFICANT CHANGE UP (ref 0–2)
EOSINOPHIL # BLD AUTO: 0.4 K/UL — SIGNIFICANT CHANGE UP (ref 0–0.5)
EOSINOPHIL NFR BLD AUTO: 14.4 % — HIGH (ref 0–6)
HCT VFR BLD CALC: 43.1 % — SIGNIFICANT CHANGE UP (ref 39–50)
HGB BLD-MCNC: 14.6 G/DL — SIGNIFICANT CHANGE UP (ref 13–17)
LYMPHOCYTES # BLD AUTO: 0.7 K/UL — LOW (ref 1–3.3)
LYMPHOCYTES # BLD AUTO: 22.1 % — SIGNIFICANT CHANGE UP (ref 13–44)
MCHC RBC-ENTMCNC: 28.2 PG — SIGNIFICANT CHANGE UP (ref 27–34)
MCHC RBC-ENTMCNC: 33.9 G/DL — SIGNIFICANT CHANGE UP (ref 32–36)
MCV RBC AUTO: 83.1 FL — SIGNIFICANT CHANGE UP (ref 80–100)
MONOCYTES # BLD AUTO: 0.3 K/UL — SIGNIFICANT CHANGE UP (ref 0–0.9)
MONOCYTES NFR BLD AUTO: 10.6 % — SIGNIFICANT CHANGE UP (ref 2–14)
NEUTROPHILS # BLD AUTO: 1.6 K/UL — LOW (ref 1.8–7.4)
NEUTROPHILS NFR BLD AUTO: 51.7 % — SIGNIFICANT CHANGE UP (ref 43–77)
PLATELET # BLD AUTO: 225 K/UL — SIGNIFICANT CHANGE UP (ref 150–400)
RBC # BLD: 5.19 M/UL — SIGNIFICANT CHANGE UP (ref 4.2–5.8)
RBC # FLD: 18.7 % — HIGH (ref 10.3–14.5)
WBC # BLD: 3.1 K/UL — LOW (ref 3.8–10.5)
WBC # FLD AUTO: 3.1 K/UL — LOW (ref 3.8–10.5)

## 2017-07-10 DIAGNOSIS — Z51.11 ENCOUNTER FOR ANTINEOPLASTIC CHEMOTHERAPY: ICD-10-CM

## 2017-07-10 DIAGNOSIS — R11.2 NAUSEA WITH VOMITING, UNSPECIFIED: ICD-10-CM

## 2017-07-21 ENCOUNTER — RESULT REVIEW (OUTPATIENT)
Age: 37
End: 2017-07-21

## 2017-07-21 ENCOUNTER — LABORATORY RESULT (OUTPATIENT)
Age: 37
End: 2017-07-21

## 2017-07-21 ENCOUNTER — APPOINTMENT (OUTPATIENT)
Dept: INFUSION THERAPY | Facility: HOSPITAL | Age: 37
End: 2017-07-21

## 2017-07-21 LAB
BASOPHILS # BLD AUTO: 0 K/UL — SIGNIFICANT CHANGE UP (ref 0–0.2)
BASOPHILS NFR BLD AUTO: 0.8 % — SIGNIFICANT CHANGE UP (ref 0–2)
EOSINOPHIL # BLD AUTO: 0.4 K/UL — SIGNIFICANT CHANGE UP (ref 0–0.5)
EOSINOPHIL NFR BLD AUTO: 10.3 % — HIGH (ref 0–6)
HCT VFR BLD CALC: 42.5 % — SIGNIFICANT CHANGE UP (ref 39–50)
HGB BLD-MCNC: 14.2 G/DL — SIGNIFICANT CHANGE UP (ref 13–17)
LYMPHOCYTES # BLD AUTO: 0.8 K/UL — LOW (ref 1–3.3)
LYMPHOCYTES # BLD AUTO: 18.1 % — SIGNIFICANT CHANGE UP (ref 13–44)
MCHC RBC-ENTMCNC: 27.8 PG — SIGNIFICANT CHANGE UP (ref 27–34)
MCHC RBC-ENTMCNC: 33.4 G/DL — SIGNIFICANT CHANGE UP (ref 32–36)
MCV RBC AUTO: 83.2 FL — SIGNIFICANT CHANGE UP (ref 80–100)
MONOCYTES # BLD AUTO: 0.6 K/UL — SIGNIFICANT CHANGE UP (ref 0–0.9)
MONOCYTES NFR BLD AUTO: 12.8 % — SIGNIFICANT CHANGE UP (ref 2–14)
NEUTROPHILS # BLD AUTO: 2.5 K/UL — SIGNIFICANT CHANGE UP (ref 1.8–7.4)
NEUTROPHILS NFR BLD AUTO: 58 % — SIGNIFICANT CHANGE UP (ref 43–77)
PLATELET # BLD AUTO: 223 K/UL — SIGNIFICANT CHANGE UP (ref 150–400)
RBC # BLD: 5.11 M/UL — SIGNIFICANT CHANGE UP (ref 4.2–5.8)
RBC # FLD: 18 % — HIGH (ref 10.3–14.5)
WBC # BLD: 4.3 K/UL — SIGNIFICANT CHANGE UP (ref 3.8–10.5)
WBC # FLD AUTO: 4.3 K/UL — SIGNIFICANT CHANGE UP (ref 3.8–10.5)

## 2017-07-25 ENCOUNTER — APPOINTMENT (OUTPATIENT)
Dept: HEMATOLOGY ONCOLOGY | Facility: CLINIC | Age: 37
End: 2017-07-25
Payer: COMMERCIAL

## 2017-07-25 VITALS
TEMPERATURE: 98.6 F | DIASTOLIC BLOOD PRESSURE: 66 MMHG | SYSTOLIC BLOOD PRESSURE: 110 MMHG | RESPIRATION RATE: 16 BRPM | OXYGEN SATURATION: 100 % | WEIGHT: 220.02 LBS | HEIGHT: 70.98 IN | HEART RATE: 73 BPM | BODY MASS INDEX: 30.8 KG/M2

## 2017-07-25 PROCEDURE — 99214 OFFICE O/P EST MOD 30 MIN: CPT

## 2017-08-24 ENCOUNTER — OUTPATIENT (OUTPATIENT)
Dept: OUTPATIENT SERVICES | Facility: HOSPITAL | Age: 37
LOS: 1 days | Discharge: HOME | End: 2017-08-24

## 2017-08-24 DIAGNOSIS — C81.90 HODGKIN LYMPHOMA, UNSPECIFIED, UNSPECIFIED SITE: ICD-10-CM

## 2017-08-31 ENCOUNTER — RESULT REVIEW (OUTPATIENT)
Age: 37
End: 2017-08-31

## 2017-08-31 ENCOUNTER — APPOINTMENT (OUTPATIENT)
Dept: HEMATOLOGY ONCOLOGY | Facility: CLINIC | Age: 37
End: 2017-08-31
Payer: COMMERCIAL

## 2017-08-31 ENCOUNTER — LABORATORY RESULT (OUTPATIENT)
Age: 37
End: 2017-08-31

## 2017-08-31 ENCOUNTER — APPOINTMENT (OUTPATIENT)
Dept: INFUSION THERAPY | Facility: HOSPITAL | Age: 37
End: 2017-08-31

## 2017-08-31 VITALS
OXYGEN SATURATION: 99 % | WEIGHT: 227.08 LBS | DIASTOLIC BLOOD PRESSURE: 70 MMHG | BODY MASS INDEX: 31.68 KG/M2 | HEART RATE: 70 BPM | TEMPERATURE: 98.1 F | RESPIRATION RATE: 16 BRPM | SYSTOLIC BLOOD PRESSURE: 112 MMHG

## 2017-08-31 LAB
BASOPHILS # BLD AUTO: 0 K/UL — SIGNIFICANT CHANGE UP (ref 0–0.2)
BASOPHILS NFR BLD AUTO: 0.3 % — SIGNIFICANT CHANGE UP (ref 0–2)
EOSINOPHIL # BLD AUTO: 0.5 K/UL — SIGNIFICANT CHANGE UP (ref 0–0.5)
EOSINOPHIL NFR BLD AUTO: 6.9 % — HIGH (ref 0–6)
HCT VFR BLD CALC: 41.6 % — SIGNIFICANT CHANGE UP (ref 39–50)
HGB BLD-MCNC: 14.6 G/DL — SIGNIFICANT CHANGE UP (ref 13–17)
LYMPHOCYTES # BLD AUTO: 0.9 K/UL — LOW (ref 1–3.3)
LYMPHOCYTES # BLD AUTO: 12.5 % — LOW (ref 13–44)
MCHC RBC-ENTMCNC: 30 PG — SIGNIFICANT CHANGE UP (ref 27–34)
MCHC RBC-ENTMCNC: 35 G/DL — SIGNIFICANT CHANGE UP (ref 32–36)
MCV RBC AUTO: 85.7 FL — SIGNIFICANT CHANGE UP (ref 80–100)
MONOCYTES # BLD AUTO: 0.6 K/UL — SIGNIFICANT CHANGE UP (ref 0–0.9)
MONOCYTES NFR BLD AUTO: 8.4 % — SIGNIFICANT CHANGE UP (ref 2–14)
NEUTROPHILS # BLD AUTO: 5.3 K/UL — SIGNIFICANT CHANGE UP (ref 1.8–7.4)
NEUTROPHILS NFR BLD AUTO: 71.9 % — SIGNIFICANT CHANGE UP (ref 43–77)
PLATELET # BLD AUTO: 151 K/UL — SIGNIFICANT CHANGE UP (ref 150–400)
RBC # BLD: 4.85 M/UL — SIGNIFICANT CHANGE UP (ref 4.2–5.8)
RBC # FLD: 14.4 % — SIGNIFICANT CHANGE UP (ref 10.3–14.5)
WBC # BLD: 7.3 K/UL — SIGNIFICANT CHANGE UP (ref 3.8–10.5)
WBC # FLD AUTO: 7.3 K/UL — SIGNIFICANT CHANGE UP (ref 3.8–10.5)

## 2017-08-31 PROCEDURE — 99214 OFFICE O/P EST MOD 30 MIN: CPT

## 2017-09-04 RX ORDER — PROCHLORPERAZINE MALEATE 10 MG/1
10 TABLET ORAL EVERY 6 HOURS
Qty: 60 | Refills: 0 | Status: COMPLETED | COMMUNITY
Start: 2017-05-08 | End: 2017-09-04

## 2017-09-04 RX ORDER — METOCLOPRAMIDE HYDROCHLORIDE 10 MG/1
10 TABLET, ORALLY DISINTEGRATING ORAL
Qty: 90 | Refills: 2 | Status: COMPLETED | COMMUNITY
Start: 2017-05-02 | End: 2017-09-04

## 2017-09-04 RX ORDER — ALLOPURINOL 300 MG/1
300 TABLET ORAL
Qty: 30 | Refills: 0 | Status: COMPLETED | COMMUNITY
Start: 2017-02-08 | End: 2017-09-04

## 2017-09-04 RX ORDER — ONDANSETRON 4 MG/1
4 TABLET ORAL
Qty: 30 | Refills: 6 | Status: COMPLETED | COMMUNITY
Start: 2017-02-14 | End: 2017-09-04

## 2017-09-15 ENCOUNTER — FORM ENCOUNTER (OUTPATIENT)
Age: 37
End: 2017-09-15

## 2017-09-16 ENCOUNTER — OUTPATIENT (OUTPATIENT)
Dept: OUTPATIENT SERVICES | Facility: HOSPITAL | Age: 37
LOS: 1 days | End: 2017-09-16
Payer: COMMERCIAL

## 2017-09-16 ENCOUNTER — APPOINTMENT (OUTPATIENT)
Dept: NUCLEAR MEDICINE | Facility: IMAGING CENTER | Age: 37
End: 2017-09-16
Payer: COMMERCIAL

## 2017-09-16 DIAGNOSIS — C81.90 HODGKIN LYMPHOMA, UNSPECIFIED, UNSPECIFIED SITE: ICD-10-CM

## 2017-09-16 PROCEDURE — 78815 PET IMAGE W/CT SKULL-THIGH: CPT

## 2017-09-16 PROCEDURE — A9552: CPT

## 2017-09-16 PROCEDURE — 78815 PET IMAGE W/CT SKULL-THIGH: CPT | Mod: 26,PS

## 2017-09-29 ENCOUNTER — OUTPATIENT (OUTPATIENT)
Dept: OUTPATIENT SERVICES | Facility: HOSPITAL | Age: 37
LOS: 1 days | Discharge: HOME | End: 2017-09-29

## 2017-09-29 DIAGNOSIS — C81.90 HODGKIN LYMPHOMA, UNSPECIFIED, UNSPECIFIED SITE: ICD-10-CM

## 2017-10-03 ENCOUNTER — RESULT REVIEW (OUTPATIENT)
Age: 37
End: 2017-10-03

## 2017-10-03 ENCOUNTER — APPOINTMENT (OUTPATIENT)
Dept: HEMATOLOGY ONCOLOGY | Facility: CLINIC | Age: 37
End: 2017-10-03
Payer: COMMERCIAL

## 2017-10-03 ENCOUNTER — APPOINTMENT (OUTPATIENT)
Dept: HEMATOLOGY ONCOLOGY | Facility: CLINIC | Age: 37
End: 2017-10-03

## 2017-10-03 VITALS
OXYGEN SATURATION: 100 % | WEIGHT: 225.97 LBS | TEMPERATURE: 98.3 F | HEART RATE: 79 BPM | BODY MASS INDEX: 31.53 KG/M2 | SYSTOLIC BLOOD PRESSURE: 115 MMHG | DIASTOLIC BLOOD PRESSURE: 79 MMHG | RESPIRATION RATE: 16 BRPM

## 2017-10-03 LAB
BASOPHILS # BLD AUTO: 0 K/UL — SIGNIFICANT CHANGE UP (ref 0–0.2)
BASOPHILS NFR BLD AUTO: 0.1 % — SIGNIFICANT CHANGE UP (ref 0–2)
EOSINOPHIL # BLD AUTO: 0.2 K/UL — SIGNIFICANT CHANGE UP (ref 0–0.5)
EOSINOPHIL NFR BLD AUTO: 2.6 % — SIGNIFICANT CHANGE UP (ref 0–6)
HCT VFR BLD CALC: 42.3 % — SIGNIFICANT CHANGE UP (ref 39–50)
HGB BLD-MCNC: 14.6 G/DL — SIGNIFICANT CHANGE UP (ref 13–17)
LYMPHOCYTES # BLD AUTO: 0.8 K/UL — LOW (ref 1–3.3)
LYMPHOCYTES # BLD AUTO: 8.5 % — LOW (ref 13–44)
MCHC RBC-ENTMCNC: 29.5 PG — SIGNIFICANT CHANGE UP (ref 27–34)
MCHC RBC-ENTMCNC: 34.5 G/DL — SIGNIFICANT CHANGE UP (ref 32–36)
MCV RBC AUTO: 85.5 FL — SIGNIFICANT CHANGE UP (ref 80–100)
MONOCYTES # BLD AUTO: 0.6 K/UL — SIGNIFICANT CHANGE UP (ref 0–0.9)
MONOCYTES NFR BLD AUTO: 7 % — SIGNIFICANT CHANGE UP (ref 2–14)
NEUTROPHILS # BLD AUTO: 7.5 K/UL — HIGH (ref 1.8–7.4)
NEUTROPHILS NFR BLD AUTO: 81.8 % — HIGH (ref 43–77)
PLATELET # BLD AUTO: 294 K/UL — SIGNIFICANT CHANGE UP (ref 150–400)
RBC # BLD: 4.94 M/UL — SIGNIFICANT CHANGE UP (ref 4.2–5.8)
RBC # FLD: 12.8 % — SIGNIFICANT CHANGE UP (ref 10.3–14.5)
WBC # BLD: 9.2 K/UL — SIGNIFICANT CHANGE UP (ref 3.8–10.5)
WBC # FLD AUTO: 9.2 K/UL — SIGNIFICANT CHANGE UP (ref 3.8–10.5)

## 2017-10-03 PROCEDURE — 99215 OFFICE O/P EST HI 40 MIN: CPT

## 2017-10-12 LAB
ALBUMIN SERPL ELPH-MCNC: 3.8 G/DL
ALP BLD-CCNC: 123 U/L
ALT SERPL-CCNC: 44 U/L
ANION GAP SERPL CALC-SCNC: 14 MMOL/L
AST SERPL-CCNC: 29 U/L
BILIRUB SERPL-MCNC: 0.2 MG/DL
BUN SERPL-MCNC: 26 MG/DL
CALCIUM SERPL-MCNC: 9 MG/DL
CHLORIDE SERPL-SCNC: 102 MMOL/L
CO2 SERPL-SCNC: 25 MMOL/L
CREAT SERPL-MCNC: 0.92 MG/DL
GLUCOSE SERPL-MCNC: 88 MG/DL
LDH SERPL-CCNC: 190 U/L
POTASSIUM SERPL-SCNC: 4.1 MMOL/L
PROT SERPL-MCNC: 7.1 G/DL
SODIUM SERPL-SCNC: 141 MMOL/L
URATE SERPL-MCNC: 4.2 MG/DL

## 2017-10-25 ENCOUNTER — APPOINTMENT (OUTPATIENT)
Dept: HEMATOLOGY ONCOLOGY | Facility: CLINIC | Age: 37
End: 2017-10-25

## 2017-10-26 ENCOUNTER — LABORATORY RESULT (OUTPATIENT)
Age: 37
End: 2017-10-26

## 2017-10-26 ENCOUNTER — RESULT REVIEW (OUTPATIENT)
Age: 37
End: 2017-10-26

## 2017-10-26 ENCOUNTER — APPOINTMENT (OUTPATIENT)
Dept: HEMATOLOGY ONCOLOGY | Facility: CLINIC | Age: 37
End: 2017-10-26

## 2017-10-26 LAB
BASOPHILS # BLD AUTO: 0 K/UL — SIGNIFICANT CHANGE UP (ref 0–0.2)
BASOPHILS NFR BLD AUTO: 0 % — SIGNIFICANT CHANGE UP (ref 0–2)
EOSINOPHIL # BLD AUTO: 0.1 K/UL — SIGNIFICANT CHANGE UP (ref 0–0.5)
EOSINOPHIL NFR BLD AUTO: 1.4 % — SIGNIFICANT CHANGE UP (ref 0–6)
HCT VFR BLD CALC: 40.9 % — SIGNIFICANT CHANGE UP (ref 39–50)
HGB BLD-MCNC: 13.9 G/DL — SIGNIFICANT CHANGE UP (ref 13–17)
LYMPHOCYTES # BLD AUTO: 0.7 K/UL — LOW (ref 1–3.3)
LYMPHOCYTES # BLD AUTO: 7 % — LOW (ref 13–44)
MCHC RBC-ENTMCNC: 28.8 PG — SIGNIFICANT CHANGE UP (ref 27–34)
MCHC RBC-ENTMCNC: 34 G/DL — SIGNIFICANT CHANGE UP (ref 32–36)
MCV RBC AUTO: 84.6 FL — SIGNIFICANT CHANGE UP (ref 80–100)
MONOCYTES # BLD AUTO: 0.6 K/UL — SIGNIFICANT CHANGE UP (ref 0–0.9)
MONOCYTES NFR BLD AUTO: 6.3 % — SIGNIFICANT CHANGE UP (ref 2–14)
NEUTROPHILS # BLD AUTO: 8.7 K/UL — HIGH (ref 1.8–7.4)
NEUTROPHILS NFR BLD AUTO: 85.2 % — HIGH (ref 43–77)
PLATELET # BLD AUTO: 301 K/UL — SIGNIFICANT CHANGE UP (ref 150–400)
RBC # BLD: 4.83 M/UL — SIGNIFICANT CHANGE UP (ref 4.2–5.8)
RBC # FLD: 12.7 % — SIGNIFICANT CHANGE UP (ref 10.3–14.5)
WBC # BLD: 10.2 K/UL — SIGNIFICANT CHANGE UP (ref 3.8–10.5)
WBC # FLD AUTO: 10.2 K/UL — SIGNIFICANT CHANGE UP (ref 3.8–10.5)

## 2017-11-01 ENCOUNTER — EMERGENCY (EMERGENCY)
Facility: HOSPITAL | Age: 37
LOS: 1 days | Discharge: HOME | End: 2017-11-01
Attending: EMERGENCY MEDICINE | Admitting: EMERGENCY MEDICINE
Payer: COMMERCIAL

## 2017-11-01 ENCOUNTER — APPOINTMENT (OUTPATIENT)
Dept: CT IMAGING | Facility: HOSPITAL | Age: 37
End: 2017-11-01

## 2017-11-01 VITALS
TEMPERATURE: 98 F | SYSTOLIC BLOOD PRESSURE: 105 MMHG | DIASTOLIC BLOOD PRESSURE: 65 MMHG | OXYGEN SATURATION: 98 % | HEART RATE: 104 BPM | RESPIRATION RATE: 22 BRPM

## 2017-11-01 VITALS
SYSTOLIC BLOOD PRESSURE: 110 MMHG | RESPIRATION RATE: 18 BRPM | TEMPERATURE: 98 F | HEART RATE: 79 BPM | DIASTOLIC BLOOD PRESSURE: 72 MMHG | OXYGEN SATURATION: 100 %

## 2017-11-01 LAB
ALBUMIN SERPL ELPH-MCNC: 3.3 G/DL — SIGNIFICANT CHANGE UP (ref 3.3–5)
ALP SERPL-CCNC: 118 U/L — SIGNIFICANT CHANGE UP (ref 40–120)
ALT FLD-CCNC: 24 U/L RC — SIGNIFICANT CHANGE UP (ref 10–45)
ANION GAP SERPL CALC-SCNC: 11 MMOL/L — SIGNIFICANT CHANGE UP (ref 5–17)
APTT BLD: 32.9 SEC — SIGNIFICANT CHANGE UP (ref 27.5–37.4)
AST SERPL-CCNC: 18 U/L — SIGNIFICANT CHANGE UP (ref 10–40)
BASOPHILS # BLD AUTO: 0 K/UL — SIGNIFICANT CHANGE UP (ref 0–0.2)
BASOPHILS NFR BLD AUTO: 0.1 % — SIGNIFICANT CHANGE UP (ref 0–2)
BILIRUB SERPL-MCNC: 0.3 MG/DL — SIGNIFICANT CHANGE UP (ref 0.2–1.2)
BUN SERPL-MCNC: 12 MG/DL — SIGNIFICANT CHANGE UP (ref 7–23)
CALCIUM SERPL-MCNC: 8.6 MG/DL — SIGNIFICANT CHANGE UP (ref 8.4–10.5)
CHLORIDE SERPL-SCNC: 101 MMOL/L — SIGNIFICANT CHANGE UP (ref 96–108)
CO2 SERPL-SCNC: 28 MMOL/L — SIGNIFICANT CHANGE UP (ref 22–31)
CREAT SERPL-MCNC: 0.89 MG/DL — SIGNIFICANT CHANGE UP (ref 0.5–1.3)
EOSINOPHIL # BLD AUTO: 0.1 K/UL — SIGNIFICANT CHANGE UP (ref 0–0.5)
EOSINOPHIL NFR BLD AUTO: 0.5 % — SIGNIFICANT CHANGE UP (ref 0–6)
GAS PNL BLDV: SIGNIFICANT CHANGE UP
GLUCOSE SERPL-MCNC: 88 MG/DL — SIGNIFICANT CHANGE UP (ref 70–99)
HCT VFR BLD CALC: 40.7 % — SIGNIFICANT CHANGE UP (ref 39–50)
HGB BLD-MCNC: 13.4 G/DL — SIGNIFICANT CHANGE UP (ref 13–17)
INR BLD: 1.32 RATIO — HIGH (ref 0.88–1.16)
LYMPHOCYTES # BLD AUTO: 0.7 K/UL — LOW (ref 1–3.3)
LYMPHOCYTES # BLD AUTO: 5.5 % — LOW (ref 13–44)
MCHC RBC-ENTMCNC: 28.1 PG — SIGNIFICANT CHANGE UP (ref 27–34)
MCHC RBC-ENTMCNC: 32.9 GM/DL — SIGNIFICANT CHANGE UP (ref 32–36)
MCV RBC AUTO: 85.3 FL — SIGNIFICANT CHANGE UP (ref 80–100)
MONOCYTES # BLD AUTO: 0.8 K/UL — SIGNIFICANT CHANGE UP (ref 0–0.9)
MONOCYTES NFR BLD AUTO: 6.4 % — SIGNIFICANT CHANGE UP (ref 2–14)
NEUTROPHILS # BLD AUTO: 11.1 K/UL — HIGH (ref 1.8–7.4)
NEUTROPHILS NFR BLD AUTO: 87.4 % — HIGH (ref 43–77)
PLATELET # BLD AUTO: 354 K/UL — SIGNIFICANT CHANGE UP (ref 150–400)
POTASSIUM SERPL-MCNC: 4.3 MMOL/L — SIGNIFICANT CHANGE UP (ref 3.5–5.3)
POTASSIUM SERPL-SCNC: 4.3 MMOL/L — SIGNIFICANT CHANGE UP (ref 3.5–5.3)
PROT SERPL-MCNC: 7.1 G/DL — SIGNIFICANT CHANGE UP (ref 6–8.3)
PROTHROM AB SERPL-ACNC: 14.5 SEC — HIGH (ref 9.8–12.7)
RBC # BLD: 4.77 M/UL — SIGNIFICANT CHANGE UP (ref 4.2–5.8)
RBC # FLD: 12.7 % — SIGNIFICANT CHANGE UP (ref 10.3–14.5)
SODIUM SERPL-SCNC: 140 MMOL/L — SIGNIFICANT CHANGE UP (ref 135–145)
WBC # BLD: 12.7 K/UL — HIGH (ref 3.8–10.5)
WBC # FLD AUTO: 12.7 K/UL — HIGH (ref 3.8–10.5)

## 2017-11-01 PROCEDURE — 82803 BLOOD GASES ANY COMBINATION: CPT

## 2017-11-01 PROCEDURE — 82947 ASSAY GLUCOSE BLOOD QUANT: CPT

## 2017-11-01 PROCEDURE — 83690 ASSAY OF LIPASE: CPT

## 2017-11-01 PROCEDURE — 74177 CT ABD & PELVIS W/CONTRAST: CPT | Mod: 26

## 2017-11-01 PROCEDURE — 80053 COMPREHEN METABOLIC PANEL: CPT

## 2017-11-01 PROCEDURE — 85730 THROMBOPLASTIN TIME PARTIAL: CPT

## 2017-11-01 PROCEDURE — 99284 EMERGENCY DEPT VISIT MOD MDM: CPT | Mod: 25

## 2017-11-01 PROCEDURE — 82435 ASSAY OF BLOOD CHLORIDE: CPT

## 2017-11-01 PROCEDURE — 85610 PROTHROMBIN TIME: CPT

## 2017-11-01 PROCEDURE — 74177 CT ABD & PELVIS W/CONTRAST: CPT

## 2017-11-01 PROCEDURE — 85014 HEMATOCRIT: CPT

## 2017-11-01 PROCEDURE — 96374 THER/PROPH/DIAG INJ IV PUSH: CPT | Mod: XU

## 2017-11-01 PROCEDURE — 84295 ASSAY OF SERUM SODIUM: CPT

## 2017-11-01 PROCEDURE — 82330 ASSAY OF CALCIUM: CPT

## 2017-11-01 PROCEDURE — 99285 EMERGENCY DEPT VISIT HI MDM: CPT

## 2017-11-01 PROCEDURE — 83605 ASSAY OF LACTIC ACID: CPT

## 2017-11-01 PROCEDURE — 85027 COMPLETE CBC AUTOMATED: CPT

## 2017-11-01 PROCEDURE — 84132 ASSAY OF SERUM POTASSIUM: CPT

## 2017-11-01 PROCEDURE — 96375 TX/PRO/DX INJ NEW DRUG ADDON: CPT

## 2017-11-01 RX ORDER — ACETAMINOPHEN 500 MG
1000 TABLET ORAL ONCE
Qty: 0 | Refills: 0 | Status: COMPLETED | OUTPATIENT
Start: 2017-11-01 | End: 2017-11-01

## 2017-11-01 RX ORDER — SODIUM CHLORIDE 9 MG/ML
1000 INJECTION INTRAMUSCULAR; INTRAVENOUS; SUBCUTANEOUS ONCE
Qty: 0 | Refills: 0 | Status: COMPLETED | OUTPATIENT
Start: 2017-11-01 | End: 2017-11-01

## 2017-11-01 RX ORDER — KETOROLAC TROMETHAMINE 30 MG/ML
30 SYRINGE (ML) INJECTION ONCE
Qty: 0 | Refills: 0 | Status: DISCONTINUED | OUTPATIENT
Start: 2017-11-01 | End: 2017-11-01

## 2017-11-01 RX ADMIN — Medication 30 MILLIGRAM(S): at 11:49

## 2017-11-01 RX ADMIN — SODIUM CHLORIDE 2000 MILLILITER(S): 9 INJECTION INTRAMUSCULAR; INTRAVENOUS; SUBCUTANEOUS at 11:49

## 2017-11-01 RX ADMIN — Medication 400 MILLIGRAM(S): at 11:49

## 2017-11-01 NOTE — ED PROVIDER NOTE - OBJECTIVE STATEMENT
37 year old man PMH hodgkin lymphoma p/w abdominal pain. Chemo 4 mo ago. Today was going to bx of abdominal mass but was in so much pain referred to ED. C/o diffuse abdominal pain worsening over 5 days a/w profuse watery diarrhea, thinks it started after eating salad. No fevers, urinary symptoms, chest pain, shortness of breath.

## 2017-11-01 NOTE — ED ADULT NURSE NOTE - OBJECTIVE STATEMENT
36 yo male presents to ED with c/o abdominal pain, sent by MD when abdominal biopsy could not be completed due to pain. Patient is A&O x3. S1 S2 sounds audible with auscultation. Lungs clear and equal hari. Patient denies SOB and chest pain. Abdomen is tender in the lower quadrants and distended in the upper quadrants. Patient reports pain in the lower abdomen and flanks. Patient reports diarrhea with blood x2 days. Bladder is non-distended. Skin is warm and dry. Patient is resting in bed with wife at bedside. Safety and comfort measures initiated and maintained. VSS/ NAD. Will continue to monitor.

## 2017-11-01 NOTE — ED PROVIDER NOTE - ATTENDING CONTRIBUTION TO CARE
Pt with Hodgkin's s/p chemo 4mo ago with gradual onset of 3 days of diarrhea (white stools 5*/day) with diffuse lower ab pain.  Scheduled for outpt bx today for mass seen on PET scan of abdomen (unknown details) but pain too much so came to ER instead.  Mild td RLQ, distended, tympanic upper abdomen.

## 2017-11-01 NOTE — ED PROVIDER NOTE - MEDICAL DECISION MAKING DETAILS
Dr. Asher Note: ab pain in Hodgkin's patient with reported abdominal mass with likely colitis, r/o other causes given reported hx.

## 2017-11-08 ENCOUNTER — OUTPATIENT (OUTPATIENT)
Dept: OUTPATIENT SERVICES | Facility: HOSPITAL | Age: 37
LOS: 1 days | Discharge: HOME | End: 2017-11-08

## 2017-11-08 DIAGNOSIS — C81.90 HODGKIN LYMPHOMA, UNSPECIFIED, UNSPECIFIED SITE: ICD-10-CM

## 2017-11-09 ENCOUNTER — RESULT REVIEW (OUTPATIENT)
Age: 37
End: 2017-11-09

## 2017-11-09 ENCOUNTER — APPOINTMENT (OUTPATIENT)
Dept: HEMATOLOGY ONCOLOGY | Facility: CLINIC | Age: 37
End: 2017-11-09
Payer: COMMERCIAL

## 2017-11-09 VITALS
BODY MASS INDEX: 31.53 KG/M2 | WEIGHT: 225.97 LBS | TEMPERATURE: 97.9 F | OXYGEN SATURATION: 100 % | DIASTOLIC BLOOD PRESSURE: 80 MMHG | RESPIRATION RATE: 16 BRPM | HEART RATE: 89 BPM | SYSTOLIC BLOOD PRESSURE: 119 MMHG

## 2017-11-09 LAB
BASOPHILS # BLD AUTO: 0 K/UL — SIGNIFICANT CHANGE UP (ref 0–0.2)
BASOPHILS NFR BLD AUTO: 0.1 % — SIGNIFICANT CHANGE UP (ref 0–2)
EOSINOPHIL # BLD AUTO: 0.2 K/UL — SIGNIFICANT CHANGE UP (ref 0–0.5)
EOSINOPHIL NFR BLD AUTO: 2.1 % — SIGNIFICANT CHANGE UP (ref 0–6)
HCT VFR BLD CALC: 37.6 % — LOW (ref 39–50)
HGB BLD-MCNC: 12.8 G/DL — LOW (ref 13–17)
LYMPHOCYTES # BLD AUTO: 0.7 K/UL — LOW (ref 1–3.3)
LYMPHOCYTES # BLD AUTO: 8 % — LOW (ref 13–44)
MCHC RBC-ENTMCNC: 28.1 PG — SIGNIFICANT CHANGE UP (ref 27–34)
MCHC RBC-ENTMCNC: 34 G/DL — SIGNIFICANT CHANGE UP (ref 32–36)
MCV RBC AUTO: 82.8 FL — SIGNIFICANT CHANGE UP (ref 80–100)
MONOCYTES # BLD AUTO: 0.7 K/UL — SIGNIFICANT CHANGE UP (ref 0–0.9)
MONOCYTES NFR BLD AUTO: 7.6 % — SIGNIFICANT CHANGE UP (ref 2–14)
NEUTROPHILS # BLD AUTO: 7.7 K/UL — HIGH (ref 1.8–7.4)
NEUTROPHILS NFR BLD AUTO: 82.3 % — HIGH (ref 43–77)
PLATELET # BLD AUTO: 360 K/UL — SIGNIFICANT CHANGE UP (ref 150–400)
RBC # BLD: 4.54 M/UL — SIGNIFICANT CHANGE UP (ref 4.2–5.8)
RBC # FLD: 12.9 % — SIGNIFICANT CHANGE UP (ref 10.3–14.5)
WBC # BLD: 9.4 K/UL — SIGNIFICANT CHANGE UP (ref 3.8–10.5)
WBC # FLD AUTO: 9.4 K/UL — SIGNIFICANT CHANGE UP (ref 3.8–10.5)

## 2017-11-09 PROCEDURE — 99215 OFFICE O/P EST HI 40 MIN: CPT

## 2017-11-09 RX ORDER — OXYCODONE HYDROCHLORIDE 5 MG/1
5 CAPSULE ORAL
Qty: 10 | Refills: 0 | Status: COMPLETED | COMMUNITY
Start: 2017-11-09 | End: 2017-11-09

## 2017-11-09 RX ORDER — FUROSEMIDE 40 MG/1
40 TABLET ORAL
Qty: 30 | Refills: 0 | Status: COMPLETED | COMMUNITY
Start: 2016-11-19 | End: 2017-11-09

## 2017-11-09 RX ORDER — FUROSEMIDE 20 MG/1
20 TABLET ORAL
Qty: 7 | Refills: 0 | Status: COMPLETED | COMMUNITY
Start: 2017-02-08 | End: 2017-11-09

## 2017-11-09 RX ORDER — LORATADINE/PSEUDOEPHEDRINE 10MG-240MG
10-240 TABLET, EXTENDED RELEASE 24 HR ORAL
Qty: 15 | Refills: 0 | Status: COMPLETED | COMMUNITY
Start: 2017-01-13 | End: 2017-11-09

## 2017-11-10 ENCOUNTER — APPOINTMENT (OUTPATIENT)
Dept: CT IMAGING | Facility: HOSPITAL | Age: 37
End: 2017-11-10

## 2017-11-15 ENCOUNTER — RESULT REVIEW (OUTPATIENT)
Age: 37
End: 2017-11-15

## 2017-11-15 ENCOUNTER — APPOINTMENT (OUTPATIENT)
Dept: HEMATOLOGY ONCOLOGY | Facility: CLINIC | Age: 37
End: 2017-11-15
Payer: COMMERCIAL

## 2017-11-15 VITALS
TEMPERATURE: 97.6 F | WEIGHT: 221.56 LBS | DIASTOLIC BLOOD PRESSURE: 75 MMHG | OXYGEN SATURATION: 98 % | HEART RATE: 91 BPM | BODY MASS INDEX: 31.72 KG/M2 | RESPIRATION RATE: 16 BRPM | SYSTOLIC BLOOD PRESSURE: 114 MMHG | HEIGHT: 70.08 IN

## 2017-11-15 DIAGNOSIS — Z78.9 OTHER SPECIFIED HEALTH STATUS: ICD-10-CM

## 2017-11-15 PROCEDURE — 99244 OFF/OP CNSLTJ NEW/EST MOD 40: CPT

## 2017-11-16 ENCOUNTER — FORM ENCOUNTER (OUTPATIENT)
Age: 37
End: 2017-11-16

## 2017-11-17 ENCOUNTER — RESULT REVIEW (OUTPATIENT)
Age: 37
End: 2017-11-17

## 2017-11-17 ENCOUNTER — OUTPATIENT (OUTPATIENT)
Dept: OUTPATIENT SERVICES | Facility: HOSPITAL | Age: 37
LOS: 1 days | End: 2017-11-17
Payer: COMMERCIAL

## 2017-11-17 ENCOUNTER — APPOINTMENT (OUTPATIENT)
Dept: CT IMAGING | Facility: HOSPITAL | Age: 37
End: 2017-11-17

## 2017-11-17 DIAGNOSIS — D86.1 SARCOIDOSIS OF LYMPH NODES: ICD-10-CM

## 2017-11-17 PROCEDURE — 88341 IMHCHEM/IMCYTCHM EA ADD ANTB: CPT

## 2017-11-17 PROCEDURE — 88342 IMHCHEM/IMCYTCHM 1ST ANTB: CPT | Mod: 26

## 2017-11-17 PROCEDURE — 88342 IMHCHEM/IMCYTCHM 1ST ANTB: CPT

## 2017-11-17 PROCEDURE — 88365 INSITU HYBRIDIZATION (FISH): CPT

## 2017-11-17 PROCEDURE — 88365 INSITU HYBRIDIZATION (FISH): CPT | Mod: 26

## 2017-11-17 PROCEDURE — 88305 TISSUE EXAM BY PATHOLOGIST: CPT

## 2017-11-17 PROCEDURE — 77012 CT SCAN FOR NEEDLE BIOPSY: CPT

## 2017-11-17 PROCEDURE — 88173 CYTOPATH EVAL FNA REPORT: CPT

## 2017-11-17 PROCEDURE — 88305 TISSUE EXAM BY PATHOLOGIST: CPT | Mod: 26

## 2017-11-17 PROCEDURE — 77012 CT SCAN FOR NEEDLE BIOPSY: CPT | Mod: 26

## 2017-11-17 PROCEDURE — 88341 IMHCHEM/IMCYTCHM EA ADD ANTB: CPT | Mod: 26

## 2017-11-17 PROCEDURE — 88173 CYTOPATH EVAL FNA REPORT: CPT | Mod: 26

## 2017-11-17 PROCEDURE — 38505 NEEDLE BIOPSY LYMPH NODES: CPT

## 2017-11-17 PROCEDURE — 88172 CYTP DX EVAL FNA 1ST EA SITE: CPT

## 2017-11-18 PROBLEM — Z78.9 NON-SMOKER: Status: ACTIVE | Noted: 2017-04-20

## 2017-11-21 RX ORDER — OXYCODONE AND ACETAMINOPHEN 5; 325 MG/1; MG/1
5-325 TABLET ORAL
Qty: 60 | Refills: 0 | Status: ACTIVE | COMMUNITY
Start: 2017-11-21 | End: 1900-01-01

## 2017-11-22 LAB — NON-GYNECOLOGICAL CYTOLOGY STUDY: SIGNIFICANT CHANGE UP

## 2017-11-24 ENCOUNTER — APPOINTMENT (OUTPATIENT)
Dept: INFUSION THERAPY | Facility: HOSPITAL | Age: 37
End: 2017-11-24

## 2017-11-24 ENCOUNTER — INPATIENT (INPATIENT)
Facility: HOSPITAL | Age: 37
LOS: 2 days | Discharge: HOME | DRG: 841 | End: 2017-11-27
Attending: INTERNAL MEDICINE | Admitting: STUDENT IN AN ORGANIZED HEALTH CARE EDUCATION/TRAINING PROGRAM
Payer: COMMERCIAL

## 2017-11-24 VITALS
WEIGHT: 225.09 LBS | HEART RATE: 122 BPM | SYSTOLIC BLOOD PRESSURE: 110 MMHG | DIASTOLIC BLOOD PRESSURE: 77 MMHG | TEMPERATURE: 99 F | OXYGEN SATURATION: 98 % | RESPIRATION RATE: 22 BRPM

## 2017-11-24 DIAGNOSIS — C85.90 NON-HODGKIN LYMPHOMA, UNSPECIFIED, UNSPECIFIED SITE: ICD-10-CM

## 2017-11-24 DIAGNOSIS — C81.96: ICD-10-CM

## 2017-11-24 LAB
ALBUMIN SERPL ELPH-MCNC: 2.7 G/DL — LOW (ref 3.3–5)
ALP SERPL-CCNC: 98 U/L — SIGNIFICANT CHANGE UP (ref 40–120)
ALT FLD-CCNC: 12 U/L RC — SIGNIFICANT CHANGE UP (ref 10–45)
ANION GAP SERPL CALC-SCNC: 13 MMOL/L — SIGNIFICANT CHANGE UP (ref 5–17)
APTT BLD: 33.5 SEC — SIGNIFICANT CHANGE UP (ref 27.5–37.4)
AST SERPL-CCNC: 13 U/L — SIGNIFICANT CHANGE UP (ref 10–40)
BASOPHILS # BLD AUTO: 0 K/UL — SIGNIFICANT CHANGE UP (ref 0–0.2)
BASOPHILS NFR BLD AUTO: 0.1 % — SIGNIFICANT CHANGE UP (ref 0–2)
BILIRUB SERPL-MCNC: 0.2 MG/DL — SIGNIFICANT CHANGE UP (ref 0.2–1.2)
BUN SERPL-MCNC: 11 MG/DL — SIGNIFICANT CHANGE UP (ref 7–23)
CALCIUM SERPL-MCNC: 8.7 MG/DL — SIGNIFICANT CHANGE UP (ref 8.4–10.5)
CHLORIDE SERPL-SCNC: 101 MMOL/L — SIGNIFICANT CHANGE UP (ref 96–108)
CO2 SERPL-SCNC: 26 MMOL/L — SIGNIFICANT CHANGE UP (ref 22–31)
CREAT SERPL-MCNC: 0.81 MG/DL — SIGNIFICANT CHANGE UP (ref 0.5–1.3)
EOSINOPHIL # BLD AUTO: 0.1 K/UL — SIGNIFICANT CHANGE UP (ref 0–0.5)
EOSINOPHIL NFR BLD AUTO: 0.9 % — SIGNIFICANT CHANGE UP (ref 0–6)
GAS PNL BLDV: SIGNIFICANT CHANGE UP
GLUCOSE SERPL-MCNC: 107 MG/DL — HIGH (ref 70–99)
HCT VFR BLD CALC: 45.5 % — SIGNIFICANT CHANGE UP (ref 39–50)
HGB BLD-MCNC: 14.4 G/DL — SIGNIFICANT CHANGE UP (ref 13–17)
HIV 1 & 2 AB SERPL IA.RAPID: SIGNIFICANT CHANGE UP
INR BLD: 1.32 RATIO — HIGH (ref 0.88–1.16)
LYMPHOCYTES # BLD AUTO: 0.8 K/UL — LOW (ref 1–3.3)
LYMPHOCYTES # BLD AUTO: 6.4 % — LOW (ref 13–44)
MCHC RBC-ENTMCNC: 26.7 PG — LOW (ref 27–34)
MCHC RBC-ENTMCNC: 31.6 GM/DL — LOW (ref 32–36)
MCV RBC AUTO: 84.5 FL — SIGNIFICANT CHANGE UP (ref 80–100)
MONOCYTES # BLD AUTO: 0.7 K/UL — SIGNIFICANT CHANGE UP (ref 0–0.9)
MONOCYTES NFR BLD AUTO: 5.6 % — SIGNIFICANT CHANGE UP (ref 2–14)
NEUTROPHILS # BLD AUTO: 10.8 K/UL — HIGH (ref 1.8–7.4)
NEUTROPHILS NFR BLD AUTO: 87 % — HIGH (ref 43–77)
PLATELET # BLD AUTO: 393 K/UL — SIGNIFICANT CHANGE UP (ref 150–400)
POTASSIUM SERPL-MCNC: 4 MMOL/L — SIGNIFICANT CHANGE UP (ref 3.5–5.3)
POTASSIUM SERPL-SCNC: 4 MMOL/L — SIGNIFICANT CHANGE UP (ref 3.5–5.3)
PROT SERPL-MCNC: 6.2 G/DL — SIGNIFICANT CHANGE UP (ref 6–8.3)
PROTHROM AB SERPL-ACNC: 14.5 SEC — HIGH (ref 9.8–12.7)
RBC # BLD: 5.38 M/UL — SIGNIFICANT CHANGE UP (ref 4.2–5.8)
RBC # FLD: 13.7 % — SIGNIFICANT CHANGE UP (ref 10.3–14.5)
SODIUM SERPL-SCNC: 140 MMOL/L — SIGNIFICANT CHANGE UP (ref 135–145)
WBC # BLD: 12.4 K/UL — HIGH (ref 3.8–10.5)
WBC # FLD AUTO: 12.4 K/UL — HIGH (ref 3.8–10.5)

## 2017-11-24 PROCEDURE — 99254 IP/OBS CNSLTJ NEW/EST MOD 60: CPT | Mod: GC

## 2017-11-24 PROCEDURE — 74177 CT ABD & PELVIS W/CONTRAST: CPT | Mod: 26

## 2017-11-24 PROCEDURE — 71010: CPT | Mod: 26

## 2017-11-24 PROCEDURE — 71260 CT THORAX DX C+: CPT | Mod: 26

## 2017-11-24 PROCEDURE — 99285 EMERGENCY DEPT VISIT HI MDM: CPT | Mod: 25

## 2017-11-24 PROCEDURE — 93010 ELECTROCARDIOGRAM REPORT: CPT

## 2017-11-24 RX ORDER — SODIUM CHLORIDE 9 MG/ML
1000 INJECTION INTRAMUSCULAR; INTRAVENOUS; SUBCUTANEOUS
Qty: 0 | Refills: 0 | Status: DISCONTINUED | OUTPATIENT
Start: 2017-11-24 | End: 2017-11-27

## 2017-11-24 RX ORDER — ENOXAPARIN SODIUM 100 MG/ML
40 INJECTION SUBCUTANEOUS EVERY 24 HOURS
Qty: 0 | Refills: 0 | Status: DISCONTINUED | OUTPATIENT
Start: 2017-11-24 | End: 2017-11-27

## 2017-11-24 RX ORDER — ACETAMINOPHEN 500 MG
650 TABLET ORAL EVERY 6 HOURS
Qty: 0 | Refills: 0 | Status: DISCONTINUED | OUTPATIENT
Start: 2017-11-24 | End: 2017-11-27

## 2017-11-24 RX ORDER — OXYCODONE AND ACETAMINOPHEN 5; 325 MG/1; MG/1
1 TABLET ORAL EVERY 4 HOURS
Qty: 0 | Refills: 0 | Status: DISCONTINUED | OUTPATIENT
Start: 2017-11-24 | End: 2017-11-25

## 2017-11-24 RX ORDER — ACETAMINOPHEN 500 MG
1000 TABLET ORAL ONCE
Qty: 0 | Refills: 0 | Status: COMPLETED | OUTPATIENT
Start: 2017-11-24 | End: 2017-11-24

## 2017-11-24 RX ORDER — ONDANSETRON 8 MG/1
1 TABLET, FILM COATED ORAL
Qty: 0 | Refills: 0 | COMMUNITY

## 2017-11-24 RX ADMIN — OXYCODONE AND ACETAMINOPHEN 1 TABLET(S): 5; 325 TABLET ORAL at 20:15

## 2017-11-24 RX ADMIN — OXYCODONE AND ACETAMINOPHEN 1 TABLET(S): 5; 325 TABLET ORAL at 19:45

## 2017-11-24 RX ADMIN — Medication 1000 MILLIGRAM(S): at 13:18

## 2017-11-24 RX ADMIN — Medication 400 MILLIGRAM(S): at 12:18

## 2017-11-24 RX ADMIN — SODIUM CHLORIDE 75 MILLILITER(S): 9 INJECTION INTRAMUSCULAR; INTRAVENOUS; SUBCUTANEOUS at 19:28

## 2017-11-24 RX ADMIN — ENOXAPARIN SODIUM 40 MILLIGRAM(S): 100 INJECTION SUBCUTANEOUS at 19:45

## 2017-11-24 NOTE — H&P ADULT - ATTENDING COMMENTS
Patient seen and agree with Dr. eWeks's note. Briefly, the patient is a 36 y.o. with Stage IV Hodgkin lymphoma originally diagnosed in January 2017, with invasion into the kidney. He was treated with doxorubicin/vinblastine/dacarbazine (AVD) chemotherapy and completed 6 cycles. Following chemo, PET scan showed persistent disease, but patient initially refused more chemo. One week ago, he had biopsy of left periaortic lymph node that shows classical Hodgkin lymphoma. He was to be admitted to [address] [place] [address] for ICE chemotherapy, but he developed abdominal swelling and pain as well as low back pain for the past 2 to 3 weeks and some SOB.      CT scans now show: Extensive mesenteric, retroperitoneal and pelvic adenopathy not significantly changed from prior abdominal imaging 11/1/2017.  New/enlarging bilateral axillary adenopathy since PET CT 9/16/2017. Large left pleural effusion with interval increase and questionable left  pleural nodularity. Resultant shift of the mediastinal structures to the right.  Collapse of the left lower lobe and lingula.    Plan:  1. In view of progressive disease, will proceed with ICE chemotherapy: ifosfamide, carboplatin, and etoposide. Eventually, patient may need autologous transplantation for relapsed/refractory disease. Renal toxicity should be minimized by Mesna infusion.  2. Standard HIV and hepatitis studies prior to chemo.  3. Left pleural effusion and collapse of LLL and lingula will need to be monitored. Patient currently breathing normally on room air oxygen.

## 2017-11-24 NOTE — ED ADULT NURSE NOTE - OBJECTIVE STATEMENT
37 year old male presented to ED with c/o of abdominal pain x3 weeks and SOB because of pain. Pt has hx of hodgkin's lymphoma. Pt denies CP, nausea/vomiting, numbness/tingling, fever, cough, chills, headache, dizziness. Pt denies pain radiating anywhere, abdominal pain all throughout anteriorly. Pt a&ox3, lung sounds clear, heart rate regular, abdomen soft nontender, distended, +BS in all four quadrants. Pt voiding regularly, denies burning/pain on urination. normal BM. Skin intact. IV in right AC 20G and patent. Pt currently resting in bed with MD at bedside, side rails up for safety. Will continue to monitor and assess while offering support and reassurance.

## 2017-11-24 NOTE — ED PROVIDER NOTE - MEDICAL DECISION MAKING DETAILS
38 yo M w/PMH lymphoma for ABD pain and distension. PE: markedly distended ABD w/diffuse ttp. Biopsy report for non-hodgkin's lymphoma in preeti-aortic lymph node. Concern for ascitics vs obstruction. Will obtain labs, pain control, ABD CT. attending Afua: 37yM h/o Hodgkin's lymphoma not currently on chemo p/w ABD distension x several days and mild RIDER. On exam, well-appearing, VSS, diminished breath sounds over L lung fields, abdomen soft, mildly distended without fluid wave. Will obtain labs, cxr, CT A/P, onc consultation, pain control and likely admission.

## 2017-11-24 NOTE — ED ADULT NURSE NOTE - CHPI ED SYMPTOMS NEG
no vomiting/no chills/no dysuria/no fever/no burning urination/no hematuria/no blood in stool/no nausea/no diarrhea

## 2017-11-24 NOTE — H&P ADULT - PROBLEM SELECTOR PROBLEM 1
Hodgkin lymphoma of intrapelvic lymph nodes, unspecified Hodgkin lymphoma type Pt. has no c/o chest pain, SOB, dizziness or palpitations. Afebrile. VSS.

## 2017-11-24 NOTE — H&P ADULT - HISTORY OF PRESENT ILLNESS
36 yo M diagnosed with Hodgkin's Lymphoma 1/31/17 Stage IV with invasion into kidney treated with 6 cycles of AVD (doxorubicin, vinblastine, dacarbazine) starting 2/5/2017 and posttreatment PET revealed persistent disease for which patient was recommended additional chemotherapy but was hesitant to pursue further therapy for because he felt his disease had much improved. Last week he had a preeti-aortic lymph node biopsied which revealed pathology concordant with original diagnosis of classical Hodgkin's Lymphoma. Over the past week patient has had increased abdominal distension, pain and lower back pain as well as shortness of breath. His pain has been controlled with PO opiate every 4-5 hours. Patient denies current fevers, chills, nausea/vomiting, diarrhea/constipation, rash, weight loss.

## 2017-11-24 NOTE — ED PROVIDER NOTE - OBJECTIVE STATEMENT
36 yo M w/PMH presenting lymphoma (not currently on treatment, last chemo July 2017) and pleural effusion for ABD pain. Past two weeks increased ABD pain and distension. had biopsy last week and awaiting results. On Percocet, took one today. Has never had paracentesis. SOB and diaphoresis were presenting symptoms before lymphoma diagnosis. Pt was treated with months of chemo. Denies fevers, chills, abd surgery, nausea, vomit. No tobacco use.  Dr. Hyacinth Mccallum attending [place]: 37yM h/o Hodgkin's lymphoma (last chemo July 2017) and pleural effusion with prior thoracentesis p/w abdomeinal distension x several days as well as mild RIDER. Reports abdominal lymph node biopsy last week, awaiting results. On Percocet.   No prior paracentesis. Denies fevers, chills, prior abd surgery, nausea, vomiting.   Dr. Hyacinth Mccallum

## 2017-11-24 NOTE — H&P ADULT - PROBLEM SELECTOR PLAN 1
Patient with persistent classical Hodgkin's Lymphoma that is causing symptomatic ascites and shortness of breath due to L pleural effusion  -Patient to start ICE cycle 1 tomorrow with Etoposide 100mg/m2 daily days 1-3, Carboplatin AUC 5 on day 2, and Ifosfamide continuous infusion on day 2 given with Mesna, the side effects of chemotherapy including but not limited to myelosuppression, sexual side effects, hemorrhagic cystitis, tinnitus were explained to patient and after discussing R/B/A he was agreeable to treatment and signed consent. Patient was offered sperm banking prior to starting chemotherapy which may cause infertility, but he declined.   -currently patient breathing comfortably on room air and will start treatment tomorrow, if respiratory condition were to worsen would consult pulmonology. Will treat with oxygen PRN increased work of breathing or hypoxia for now.  -check HIV, Hepatitis B core Ab, Hepatitis B surface Ab, Hepatitis B surface Ag, Hepatitis C antibody and HIV  -discussed plan with Dr. Dupree and Dr. Mccallum (patient's primary hematologist)    Oscar Weeks  PGY-5, Hematology-Oncology Fellow  [phone] ([place]) [phone] ([facility])

## 2017-11-24 NOTE — H&P ADULT - NSHPPHYSICALEXAM_GEN_ALL_CORE
T(C): 36.9 (24 Nov 2017 16:16), Max: 37.1 (24 Nov 2017 11:03)  T(F): 98.4 (24 Nov 2017 16:16), Max: 98.7 (24 Nov 2017 11:03)  HR: 101 (24 Nov 2017 16:16) (101 - 122)  BP: 112/78 (24 Nov 2017 16:16) (110/77 - 112/78)  BP(mean): --  ABP: --  ABP(mean): --  RR: 18 (24 Nov 2017 16:16) (18 - 22)  SpO2: 98% (24 Nov 2017 16:16) (98% - 98%)    General: No acute distress  Head: AT, NC  Eyes: EOMI, no sclearl icterus  Neck: supple, no masses   Cardiovascular: +S1S2 RRR without R/M/G  Chest: CTA bilaterally without R/W/R   Abdomen: Soft, ND/NT, BS+   Extremities: Without cyanosis/clubbing/edema

## 2017-11-24 NOTE — H&P ADULT - ASSESSMENT
36 yo M diagnosed with Hodgkin's Lymphoma 1/31/17 Stage IV with invasion into kidney treated with 6 cycles of AVD (doxorubicin, vinblastine, dacarbazine) starting 2/5/2017 and posttreatment PET revealed persistent disease, patient initially hesitant to pursue further treatment but agreed to preeti-aortic lymph node biopsy last week which revealed persistent classical Hodgkin's Lymphoma and is presenting to [facility] ED with worsening symptoms of progressive disease seen on CT scan plan to start ICE cycle 1 on 11/25/17

## 2017-11-24 NOTE — ED ADULT NURSE NOTE - CHIEF COMPLAINT QUOTE
abd distention and pain/ SOB on exertion. abd biopsy in this hospital on 11/17/17. since biopsy abd is becoming more distended

## 2017-11-24 NOTE — H&P ADULT - NSHPLABSRESULTS_GEN_ALL_CORE
LABS:    Blood Cultures:                           14.4   12.4  )-----------( 393      ( 24 Nov 2017 11:52 )             45.5     Mean Cell Volume : 84.5 fl  Mean Cell Hemoglobin : 26.7 pg  Mean Cell Hemoglobin Concentration : 31.6 gm/dL  Auto Neutrophil # : 10.8 K/uL  Auto Lymphocyte # : 0.8 K/uL  Auto Monocyte # : 0.7 K/uL  Auto Eosinophil # : 0.1 K/uL  Auto Basophil # : 0.0 K/uL  Auto Neutrophil % : 87.0 %  Auto Lymphocyte % : 6.4 %  Auto Monocyte % : 5.6 %  Auto Eosinophil % : 0.9 %  Auto Basophil % : 0.1 %      11-24    140  |  101  |  11  ----------------------------<  107<H>  4.0   |  26  |  0.81    Ca    8.7      24 Nov 2017 11:52    TPro  6.2  /  Alb  2.7<L>  /  TBili  0.2  /  DBili  x   /  AST  13  /  ALT  12  /  AlkPhos  98  11-24    PT/INR - ( 24 Nov 2017 11:52 )   PT: 14.5 sec;   INR: 1.32 ratio         PTT - ( 24 Nov 2017 11:52 )  PTT:33.5 sec    EXAM:  CT ABDOMEN AND PELVIS OC IC                          EXAM:  CT CHEST IC                          PROCEDURE DATE:  11/24/2017      INTERPRETATION:  CLINICAL INFORMATION: Shortness of breath, increased   abdominal distention. Lymphoma.    COMPARISON: CT abdomen pelvis 11/1/2017, PET CT 9/16/2017    IMPRESSION:     Extensive mesenteric, retroperitoneal and pelvic adenopathy not   significantly changed from prior abdominal imaging 11/1/2017.    New/enlarging bilateral axillary adenopathy since PET CT 9/16/2017.    Large left pleural effusion with interval increase and questionable left   pleural nodularity. Resultant shift of the mediastinal structures to the   right.  Collapse of the left lower lobe and lingula.    SHANNON ELAINE M.D., ATTENDING RADIOLOGIST  This document has been electronically signed. Nov 24 2017  3:15PM

## 2017-11-24 NOTE — H&P ADULT - REASON FOR ADMISSION
Persistent Hodgkin's lymphoma with increased abdominal swelling, shortness of breath and lower back pain

## 2017-11-25 ENCOUNTER — TRANSCRIPTION ENCOUNTER (OUTPATIENT)
Age: 37
End: 2017-11-25

## 2017-11-25 DIAGNOSIS — Z29.9 ENCOUNTER FOR PROPHYLACTIC MEASURES, UNSPECIFIED: ICD-10-CM

## 2017-11-25 LAB
ALBUMIN SERPL ELPH-MCNC: 2.4 G/DL — LOW (ref 3.3–5)
ALP SERPL-CCNC: 88 U/L — SIGNIFICANT CHANGE UP (ref 40–120)
ALT FLD-CCNC: 8 U/L RC — LOW (ref 10–45)
ANION GAP SERPL CALC-SCNC: 11 MMOL/L — SIGNIFICANT CHANGE UP (ref 5–17)
AST SERPL-CCNC: 9 U/L — LOW (ref 10–40)
BASOPHILS # BLD AUTO: 0 K/UL — SIGNIFICANT CHANGE UP (ref 0–0.2)
BASOPHILS NFR BLD AUTO: 0.2 % — SIGNIFICANT CHANGE UP (ref 0–2)
BILIRUB SERPL-MCNC: 0.2 MG/DL — SIGNIFICANT CHANGE UP (ref 0.2–1.2)
BLD GP AB SCN SERPL QL: NEGATIVE — SIGNIFICANT CHANGE UP
BUN SERPL-MCNC: 12 MG/DL — SIGNIFICANT CHANGE UP (ref 7–23)
CALCIUM SERPL-MCNC: 8.1 MG/DL — LOW (ref 8.4–10.5)
CHLORIDE SERPL-SCNC: 104 MMOL/L — SIGNIFICANT CHANGE UP (ref 96–108)
CO2 SERPL-SCNC: 25 MMOL/L — SIGNIFICANT CHANGE UP (ref 22–31)
CREAT SERPL-MCNC: 0.67 MG/DL — SIGNIFICANT CHANGE UP (ref 0.5–1.3)
EOSINOPHIL # BLD AUTO: 0.1 K/UL — SIGNIFICANT CHANGE UP (ref 0–0.5)
EOSINOPHIL NFR BLD AUTO: 1.2 % — SIGNIFICANT CHANGE UP (ref 0–6)
GLUCOSE SERPL-MCNC: 88 MG/DL — SIGNIFICANT CHANGE UP (ref 70–99)
HBV CORE AB SER-ACNC: SIGNIFICANT CHANGE UP
HBV SURFACE AB SER-ACNC: SIGNIFICANT CHANGE UP
HBV SURFACE AG SER-ACNC: SIGNIFICANT CHANGE UP
HCT VFR BLD CALC: 39.9 % — SIGNIFICANT CHANGE UP (ref 39–50)
HCV AB S/CO SERPL IA: 0.12 S/CO — SIGNIFICANT CHANGE UP
HCV AB SERPL-IMP: SIGNIFICANT CHANGE UP
HGB BLD-MCNC: 12.7 G/DL — LOW (ref 13–17)
LDH SERPL L TO P-CCNC: 127 U/L — SIGNIFICANT CHANGE UP (ref 50–242)
LYMPHOCYTES # BLD AUTO: 0.7 K/UL — LOW (ref 1–3.3)
LYMPHOCYTES # BLD AUTO: 6 % — LOW (ref 13–44)
MAGNESIUM SERPL-MCNC: 2 MG/DL — SIGNIFICANT CHANGE UP (ref 1.6–2.6)
MCHC RBC-ENTMCNC: 26.9 PG — LOW (ref 27–34)
MCHC RBC-ENTMCNC: 31.9 GM/DL — LOW (ref 32–36)
MCV RBC AUTO: 84.3 FL — SIGNIFICANT CHANGE UP (ref 80–100)
MONOCYTES # BLD AUTO: 0.6 K/UL — SIGNIFICANT CHANGE UP (ref 0–0.9)
MONOCYTES NFR BLD AUTO: 5.3 % — SIGNIFICANT CHANGE UP (ref 2–14)
NEUTROPHILS # BLD AUTO: 9.6 K/UL — HIGH (ref 1.8–7.4)
NEUTROPHILS NFR BLD AUTO: 87.2 % — HIGH (ref 43–77)
PHOSPHATE SERPL-MCNC: 3.4 MG/DL — SIGNIFICANT CHANGE UP (ref 2.5–4.5)
PLATELET # BLD AUTO: 350 K/UL — SIGNIFICANT CHANGE UP (ref 150–400)
POTASSIUM SERPL-MCNC: 4 MMOL/L — SIGNIFICANT CHANGE UP (ref 3.5–5.3)
POTASSIUM SERPL-SCNC: 4 MMOL/L — SIGNIFICANT CHANGE UP (ref 3.5–5.3)
PROT SERPL-MCNC: 5.4 G/DL — LOW (ref 6–8.3)
RBC # BLD: 4.73 M/UL — SIGNIFICANT CHANGE UP (ref 4.2–5.8)
RBC # FLD: 13.5 % — SIGNIFICANT CHANGE UP (ref 10.3–14.5)
RH IG SCN BLD-IMP: POSITIVE — SIGNIFICANT CHANGE UP
SODIUM SERPL-SCNC: 140 MMOL/L — SIGNIFICANT CHANGE UP (ref 135–145)
URATE SERPL-MCNC: 4 MG/DL — SIGNIFICANT CHANGE UP (ref 3.4–8.8)
WBC # BLD: 11 K/UL — HIGH (ref 3.8–10.5)
WBC # FLD AUTO: 11 K/UL — HIGH (ref 3.8–10.5)

## 2017-11-25 PROCEDURE — 99233 SBSQ HOSP IP/OBS HIGH 50: CPT

## 2017-11-25 RX ORDER — FOSAPREPITANT DIMEGLUMINE 150 MG/5ML
150 INJECTION, POWDER, LYOPHILIZED, FOR SOLUTION INTRAVENOUS ONCE
Qty: 0 | Refills: 0 | Status: COMPLETED | OUTPATIENT
Start: 2017-11-25 | End: 2017-11-25

## 2017-11-25 RX ORDER — ONDANSETRON 8 MG/1
16 TABLET, FILM COATED ORAL DAILY
Qty: 0 | Refills: 0 | Status: DISCONTINUED | OUTPATIENT
Start: 2017-11-25 | End: 2017-11-27

## 2017-11-25 RX ORDER — OXYCODONE HYDROCHLORIDE 5 MG/1
5 TABLET ORAL EVERY 6 HOURS
Qty: 0 | Refills: 0 | Status: DISCONTINUED | OUTPATIENT
Start: 2017-11-25 | End: 2017-11-27

## 2017-11-25 RX ORDER — ONDANSETRON 8 MG/1
8 TABLET, FILM COATED ORAL EVERY 8 HOURS
Qty: 0 | Refills: 0 | Status: DISCONTINUED | OUTPATIENT
Start: 2017-11-25 | End: 2017-11-27

## 2017-11-25 RX ORDER — ETOPOSIDE 20 MG/ML
218 VIAL (ML) INTRAVENOUS DAILY
Qty: 0 | Refills: 0 | Status: DISCONTINUED | OUTPATIENT
Start: 2017-11-25 | End: 2017-11-27

## 2017-11-25 RX ORDER — DEXAMETHASONE 0.5 MG/5ML
12 ELIXIR ORAL DAILY
Qty: 0 | Refills: 0 | Status: DISCONTINUED | OUTPATIENT
Start: 2017-11-25 | End: 2017-11-27

## 2017-11-25 RX ADMIN — OXYCODONE AND ACETAMINOPHEN 1 TABLET(S): 5; 325 TABLET ORAL at 06:56

## 2017-11-25 RX ADMIN — OXYCODONE AND ACETAMINOPHEN 1 TABLET(S): 5; 325 TABLET ORAL at 03:05

## 2017-11-25 RX ADMIN — OXYCODONE AND ACETAMINOPHEN 1 TABLET(S): 5; 325 TABLET ORAL at 02:28

## 2017-11-25 RX ADMIN — SODIUM CHLORIDE 75 MILLILITER(S): 9 INJECTION INTRAMUSCULAR; INTRAVENOUS; SUBCUTANEOUS at 18:07

## 2017-11-25 RX ADMIN — FOSAPREPITANT DIMEGLUMINE 300 MILLIGRAM(S): 150 INJECTION, POWDER, LYOPHILIZED, FOR SOLUTION INTRAVENOUS at 16:00

## 2017-11-25 RX ADMIN — ENOXAPARIN SODIUM 40 MILLIGRAM(S): 100 INJECTION SUBCUTANEOUS at 18:08

## 2017-11-25 RX ADMIN — OXYCODONE AND ACETAMINOPHEN 1 TABLET(S): 5; 325 TABLET ORAL at 06:29

## 2017-11-25 RX ADMIN — OXYCODONE HYDROCHLORIDE 5 MILLIGRAM(S): 5 TABLET ORAL at 11:13

## 2017-11-25 NOTE — DISCHARGE NOTE ADULT - HOSPITAL COURSE
36 yo M diagnosed with Hodgkin's Lymphoma 1/31/17 Stage IV with invasion into kidney treated with 6 cycles of AVD (doxorubicin, vinblastine, dacarbazine) starting 2/5/2017 and posttreatment PET revealed persistent disease, patient initially hesitant to pursue further treatment but agreed to preeti-aortic lymph node biopsy last week which revealed persistent classical Hodgkin's Lymphoma and is presenting to [facility] ED with worsening symptoms of progressive disease seen on CT scan. Started ICE cycle 1  with Etoposide 100mg/m2 daily days 1-3, Carboplatin AUC 5 on day 2, and Ifosfamide continuous infusion on day 2 given with Mesna, the side effects of chemotherapy including but not limited to myelosuppression, sexual side effects, hemorrhagic cystitis, tinnitus were explained to patient and after discussing R/B/A he was agreeable to treatment and signed consent. Patient was offered sperm banking prior to starting chemotherapy which may cause infertility, but he declined. 36 yo M diagnosed with Hodgkin's Lymphoma 1/31/17 Stage IV with invasion into kidney treated with 6 cycles of AVD (doxorubicin, vinblastine, dacarbazine) starting 2/5/2017 and posttreatment PET revealed persistent disease, patient initially hesitant to pursue further treatment but agreed to preeti-aortic lymph node biopsy last week which revealed persistent classical Hodgkin's Lymphoma and is presenting to [facility] ED. Patient for the last week has had increased abdominal distension, abdominal pain and shortness of breath.   Found to have near complete opacification of the left hemithorax with mediastinal shift. on 11/25 patient was started on Cycle 1 of ICE. Patient tolerated chemotherapy well without any adverse reactions. Complete resolution of abdominal distension and pain by the end of Chemotherapy. Patient to be discharged home and to follow up with . 38 yo M diagnosed with Hodgkin's Lymphoma 1/31/17 Stage IV with invasion into kidney treated with 6 cycles of AVD (doxorubicin, vinblastine, dacarbazine) starting 2/5/2017 and posttreatment PET revealed persistent disease, patient initially hesitant to pursue further treatment but agreed to preeti-aortic lymph node biopsy last week which revealed persistent classical Hodgkin's Lymphoma and is presenting to [facility] ED. Patient for the last week has had increased abdominal distension, abdominal pain and shortness of breath.   Found to have near complete opacification of the left hemithorax with mediastinal shift. on 11/25 patient was started on Cycle 1 of ICE. Patient tolerated chemotherapy well without any adverse reactions. Complete resolution of abdominal distension and pain by the end of this cycle. Patient to be discharged home and to follow up with .

## 2017-11-25 NOTE — DISCHARGE NOTE ADULT - MEDICATION SUMMARY - MEDICATIONS TO TAKE
I will START or STAY ON the medications listed below when I get home from the hospital:    metoclopramide 10 mg oral tablet  -- 1 tab(s) by mouth 4 times a day (before meals and at bedtime)  -- Indication: For Hodgkin lymphoma of intrapelvic lymph nodes, unspecified Hodgkin lymphoma type

## 2017-11-25 NOTE — PROGRESS NOTE ADULT - ASSESSMENT
38 yo M diagnosed with Hodgkin's Lymphoma 1/31/17 Stage IV with invasion into kidney treated with 6 cycles of AVD (doxorubicin, vinblastine, dacarbazine) starting 2/5/2017 and posttreatment PET revealed persistent disease, patient initially hesitant to pursue further treatment but agreed to preeti-aortic lymph node biopsy last week which revealed persistent classical Hodgkin's Lymphoma and is presenting to [facility] ED with worsening symptoms of progressive disease seen on CT scan plan to start ICE cycle 1 on 11/25/17 38 yo M diagnosed with Hodgkin's Lymphoma 1/31/17 Stage IV with invasion into kidney treated with 6 cycles of AVD (doxorubicin, vinblastine, dacarbazine) starting 2/5/2017 and posttreatment PET revealed persistent disease, patient initially hesitant to pursue further treatment but agreed to preeti-aortic lymph node biopsy last week which revealed persistent classical Hodgkin's Lymphoma and is presenting to [facility] ED with worsening symptoms of progressive disease seen on CT scan. Started ICE cycle 1 on 11/25/17

## 2017-11-25 NOTE — DISCHARGE NOTE ADULT - ADDITIONAL INSTRUCTIONS
To [facility] Appointment with  [facility] on Wednesday 11/29/17 @10 AM for Neulasta injection.  Follow up appointment with Dr. Hyacinth Mccallum on Appointment with  [facility] on Wednesday 11/29/17 @10 AM for Neulasta injection.  F/U  appointment with Dr. Hyacinth Mccallum on.. Appointment with  [facility] on Wednesday 11/29/17 @10 AM for Neulasta injection.  Dr Mccallum's office will call you with your follow up appointment Appointment with  [facility] on Wednesday 11/29/17 @10 AM for Neulasta injection.  Dr Mccallum's office will call you with your follow up appointment. Appointment with  [facility] on Wednesday 11/29/17 @10 AM for Neulasta injection.  follow up appointment with Dr.Amy Mccallum on Thursday 11/30/17 @3.20 pm

## 2017-11-25 NOTE — DISCHARGE NOTE ADULT - PATIENT PORTAL LINK FT
“You can access the FollowHealth Patient Portal, offered by [facility], by registering with the following website: [email or web address]”

## 2017-11-25 NOTE — DISCHARGE NOTE ADULT - MEDICATION SUMMARY - MEDICATIONS TO STOP TAKING
I will STOP taking the medications listed below when I get home from the hospital:    Percocet 5/325 oral tablet  -- 1 tab(s) by mouth every 6 hours

## 2017-11-25 NOTE — PROGRESS NOTE ADULT - SUBJECTIVE AND OBJECTIVE BOX
Diagnosis: Relapse Hodgkins    Protocol/Chemo Regimen:  cycle 1 ICE  Day: 1     Pt endorsed:    Review of Systems:    Pain scale:                                        Location:    Diet:     Allergies: No Known Allergies    HEME/ONC MEDICATIONS  enoxaparin Injectable 40 milliGRAM(s) SubCutaneous every 24 hours  etoposide IVPB 218 milliGRAM(s) IV Intermittent daily      STANDING MEDICATIONS  dexamethasone  IVPB 12 milliGRAM(s) IV Intermittent daily  fosaprepitant IVPB 150 milliGRAM(s) IV Intermittent once  ondansetron  IVPB 16 milliGRAM(s) IV Intermittent daily  sodium chloride 0.9%. 1000 milliLiter(s) IV Continuous <Continuous>      PRN MEDICATIONS  acetaminophen   Tablet. 650 milliGRAM(s) Oral every 6 hours PRN  oxyCODONE    IR 5 milliGRAM(s) Oral every 6 hours PRN        Vital Signs Last 24 Hrs  T(C): 36.4 (25 Nov 2017 09:14), Max: 37.1 (24 Nov 2017 22:23)  T(F): 97.5 (25 Nov 2017 09:14), Max: 98.7 (24 Nov 2017 22:23)  HR: 98 (25 Nov 2017 09:14) (86 - 101)  BP: 133/83 (25 Nov 2017 09:14) (102/60 - 137/91)  RR: 18 (25 Nov 2017 09:14) (18 - 18)  SpO2: 97% (25 Nov 2017 09:14) (95% - 98%)    PHYSICAL EXAM  General: adult in NAD  HEENT: clear oropharynx, anicteric sclera, pink conjunctiva  Neck: supple  CV: normal S1/S2 RRR  Lungs: positive air movement b/l ant lungs,clear to auscultation, no wheezes, no rales  Abdomen: soft non-tender non-distended, no hepatosplenomegaly  Ext: no clubbing cyanosis or edema  Skin: no rashes and no petechiae  Neuro: alert and oriented X 4, no focal deficits  Central Line: normal    LABS:    Blood Cultures:                           12.7   11.0  )-----------( 350      ( 25 Nov 2017 07:00 )             39.9         Mean Cell Volume : 84.3 fl  Mean Cell Hemoglobin : 26.9 pg  Mean Cell Hemoglobin Concentration : 31.9 gm/dL  Auto Neutrophil # : 9.6 K/uL  Auto Lymphocyte # : 0.7 K/uL  Auto Monocyte # : 0.6 K/uL  Auto Eosinophil # : 0.1 K/uL  Auto Basophil # : 0.0 K/uL  Auto Neutrophil % : 87.2 %  Auto Lymphocyte % : 6.0 %  Auto Monocyte % : 5.3 %  Auto Eosinophil % : 1.2 %  Auto Basophil % : 0.2 %      11-25    140  |  104  |  12  ----------------------------<  88  4.0   |  25  |  0.67    Ca    8.1<L>      25 Nov 2017 07:00  Phos  3.4     11-25  Mg     2.0     11-25    TPro  5.4<L>  /  Alb  2.4<L>  /  TBili  0.2  /  DBili  x   /  AST  9<L>  /  ALT  8<L>  /  AlkPhos  88  11-25      Mg 2.0  Phos 3.4      PT/INR - ( 24 Nov 2017 11:52 )   PT: 14.5 sec;   INR: 1.32 ratio         PTT - ( 24 Nov 2017 11:52 )  PTT:33.5 sec      Uric Acid 4.0        RADIOLOGY & ADDITIONAL STUDIES: Diagnosis: Relapse Hodgkins    Protocol/Chemo Regimen:  cycle 1 ICE  Day: 1     Pt endorsed: no comlpaints    Review of Systems: Patient denied nausea, vomiting, odynophagia, chest pain, cough, dyspnea, abdominal pain, constipation, diarrhea, preeti-rectal pain, rash, fatigue, headache, depression      Pain scale:   0                                         Diet:  regular    Allergies: No Known Allergies    HEME/ONC MEDICATIONS  enoxaparin Injectable 40 milliGRAM(s) SubCutaneous every 24 hours  etoposide IVPB 218 milliGRAM(s) IV Intermittent daily      STANDING MEDICATIONS  dexamethasone  IVPB 12 milliGRAM(s) IV Intermittent daily  fosaprepitant IVPB 150 milliGRAM(s) IV Intermittent once  ondansetron  IVPB 16 milliGRAM(s) IV Intermittent daily  sodium chloride 0.9%. 1000 milliLiter(s) IV Continuous <Continuous>      PRN MEDICATIONS  acetaminophen   Tablet. 650 milliGRAM(s) Oral every 6 hours PRN  oxyCODONE    IR 5 milliGRAM(s) Oral every 6 hours PRN        Vital Signs Last 24 Hrs  T(C): 36.4 (25 Nov 2017 09:14), Max: 37.1 (24 Nov 2017 22:23)  T(F): 97.5 (25 Nov 2017 09:14), Max: 98.7 (24 Nov 2017 22:23)  HR: 98 (25 Nov 2017 09:14) (86 - 101)  BP: 133/83 (25 Nov 2017 09:14) (102/60 - 137/91)  RR: 18 (25 Nov 2017 09:14) (18 - 18)  SpO2: 97% (25 Nov 2017 09:14) (95% - 98%)    PHYSICAL EXAM  General: adult in NAD  HEENT: clear oropharynx, anicteric sclera, pink conjunctiva  Neck: supple  CV: normal S1/S2 RRR  Lungs: positive air movement b/l ant lungs,clear to auscultation, no wheezes, no rales  Abdomen: soft non-tender non-distended, no hepatosplenomegaly  Ext: no clubbing cyanosis or edema  Skin: no rashes and no petechiae  Neuro: alert and oriented X 4, no focal deficits  Central Line: peripheral    LABS:                          12.7   11.0  )-----------( 350      ( 25 Nov 2017 07:00 )             39.9         Mean Cell Volume : 84.3 fl  Mean Cell Hemoglobin : 26.9 pg  Mean Cell Hemoglobin Concentration : 31.9 gm/dL  Auto Neutrophil # : 9.6 K/uL  Auto Lymphocyte # : 0.7 K/uL  Auto Monocyte # : 0.6 K/uL  Auto Eosinophil # : 0.1 K/uL  Auto Basophil # : 0.0 K/uL  Auto Neutrophil % : 87.2 %  Auto Lymphocyte % : 6.0 %  Auto Monocyte % : 5.3 %  Auto Eosinophil % : 1.2 %  Auto Basophil % : 0.2 %      11-25    140  |  104  |  12  ----------------------------<  88  4.0   |  25  |  0.67    Ca    8.1<L>      25 Nov 2017 07:00  Phos  3.4     11-25  Mg     2.0     11-25    TPro  5.4<L>  /  Alb  2.4<L>  /  TBili  0.2  /  DBili  x   /  AST  9<L>  /  ALT  8<L>  /  AlkPhos  88  11-25    PT/INR - ( 24 Nov 2017 11:52 )   PT: 14.5 sec;   INR: 1.32 ratio    PTT - ( 24 Nov 2017 11:52 )  PTT:33.5 sec      Uric Acid 4.0

## 2017-11-25 NOTE — PROGRESS NOTE ADULT - ATTENDING COMMENTS
relapsed/refractory Hodgkin's lymphoma, admitted for ICE salvage. Chemo to start today. Supportive care.

## 2017-11-25 NOTE — PROGRESS NOTE ADULT - PROBLEM SELECTOR PLAN 1
Patient with persistent classical Hodgkin's Lymphoma that is causing symptomatic ascites and shortness of breath due to L pleural effusion  -Patient to start ICE cycle 1 tomorrow with Etoposide 100mg/m2 daily days 1-3, Carboplatin AUC 5 on day 2, and Ifosfamide continuous infusion on day 2 given with Mesna, the side effects of chemotherapy including but not limited to myelosuppression, sexual side effects, hemorrhagic cystitis, tinnitus were explained to patient and after discussing R/B/A he was agreeable to treatment and signed consent. Patient was offered sperm banking prior to starting chemotherapy which may cause infertility, but he declined.   -currently patient breathing comfortably on room air and will start treatment tomorrow, if respiratory condition were to worsen would consult pulmonology. Will treat with oxygen PRN increased work of breathing or hypoxia for now.  -check HIV, Hepatitis B core Ab, Hepatitis B surface Ab, Hepatitis B surface Ag, Hepatitis C antibody and HIV  -discussed plan with Dr. Dupree and Dr. Mccallum (patient's primary hematologist)    Oscar Weeks  PGY-5, Hematology-Oncology Fellow  [phone] ([place]) [id number] ([facility]) Patient with persistent classical Hodgkin's Lymphoma that is causing symptomatic ascites and shortness of breath due to L pleural effusion  -Patient to start ICE cycle 1 today with Etoposide 100mg/m2 daily days 1-3, Carboplatin AUC 5 on day 2, and Ifosfamide continuous infusion on day 2 given with Mesna, the side effects of chemotherapy including but not limited to myelosuppression, sexual side effects, hemorrhagic cystitis, tinnitus were explained to patient and after discussing R/B/A he was agreeable to treatment and signed consent. Patient was offered sperm banking prior to starting chemotherapy which may cause infertility, but he declined.   -currently patient breathing comfortably on room air and will start treatment tomorrow, if respiratory condition were to worsen would consult pulmonology. Will treat with oxygen PRN increased work of breathing or hypoxia for now.  -check HIV, Hepatitis B core Ab, Hepatitis B surface Ab, Hepatitis B surface Ag, Hepatitis C antibody and HIV Patient with persistent classical Hodgkin's Lymphoma that is causing symptomatic ascites and shortness of breath due to L pleural effusion  -Start ICE cycle 1 today with Etoposide 100mg/m2 daily days 1-3, Carboplatin AUC 5 on day 2, and Ifosfamide continuous infusion on day 2 given with Mesna, the side effects of chemotherapy including but not limited to myelosuppression, sexual side effects, hemorrhagic cystitis, tinnitus were explained to patient and after discussing R/B/A he was agreeable to treatment and signed consent. Patient was offered sperm banking prior to starting chemotherapy which may cause infertility, but he declined.   -currently patient breathing comfortably on room air and will start treatment tomorrow, if respiratory condition were to worsen would consult pulmonology. Will treat with oxygen PRN increased work of breathing or hypoxia for now.  -check HIV, Hepatitis B core Ab, Hepatitis B surface Ab, Hepatitis B surface Ag, Hepatitis C antibody and HIV  - follow up CBC w diff transfuse prn  - CMP, Tumor lysis labs  - daily weight  - mouth care  - antiemetics  -Neulasta 24 hr after discharge

## 2017-11-25 NOTE — DISCHARGE NOTE ADULT - PLAN OF CARE
remain free from infection, maintaincounts Notify MD and report to ER for any temperature greater than or equal to 100.4 degrees, intractable nausea, vomiting, diarrhea, or uncontrolled bleeding. remain free from infection, maintain counts Notify MD and report to ER for any temperature greater than or equal to 100.4 degrees, intractable nausea, vomiting, diarrhea, or uncontrolled bleeding.  To yessica at designated appointment for neulasta injection.

## 2017-11-25 NOTE — ADVANCED PRACTICE NURSE CONSULT - ASSESSMENT
Labs today WBC 11 HGB 12.7 HCT 39.9 PLTS 350 CR.  .67TBILI. .2 . Pt found in bed, alert and oriented x4, v/s stable afebrile, n/c offered. Right chest wall mediport in place. Site without redness or swelling. Lumen previously accessed with + blood return flushes well with NS. Pt premedicated with  Zofran 16 mg IVSS and Emend 150 mg ivss prior to chemotherapy. Chemotherapy verified by 2 RNs prior to administration. At 1615 treated with Etoposide 100mg/m2= 218mg ivss over 2 hours Pt remains in bed with n/c offered. Primary RN aware of treatment plan.

## 2017-11-25 NOTE — DISCHARGE NOTE ADULT - PROVIDER TOKENS
FREE:[LAST:[Dr. Mccallum],FIRST:[Hyacinth],PHONE:[phone]],FAX:[(   [phone]    -],ADDRESS:[[address] [place] [address]]]

## 2017-11-25 NOTE — DISCHARGE NOTE ADULT - CARE PLAN
Principal Discharge DX:	Hodgkin lymphoma of intrapelvic lymph nodes, unspecified Hodgkin lymphoma type  Goal:	remain free from infection, maintaincounts  Instructions for follow-up, activity and diet:	Notify MD and report to ER for any temperature greater than or equal to 100.4 degrees, intractable nausea, vomiting, diarrhea, or uncontrolled bleeding. Principal Discharge DX:	Hodgkin lymphoma of intrapelvic lymph nodes, unspecified Hodgkin lymphoma type  Goal:	remain free from infection, maintain counts  Instructions for follow-up, activity and diet:	Notify MD and report to ER for any temperature greater than or equal to 100.4 degrees, intractable nausea, vomiting, diarrhea, or uncontrolled bleeding.  To yessica at designated appointment for neulasta injection.

## 2017-11-26 LAB
ALBUMIN SERPL ELPH-MCNC: 2.4 G/DL — LOW (ref 3.3–5)
ALP SERPL-CCNC: 94 U/L — SIGNIFICANT CHANGE UP (ref 40–120)
ALT FLD-CCNC: 14 U/L RC — SIGNIFICANT CHANGE UP (ref 10–45)
ANION GAP SERPL CALC-SCNC: 12 MMOL/L — SIGNIFICANT CHANGE UP (ref 5–17)
AST SERPL-CCNC: 13 U/L — SIGNIFICANT CHANGE UP (ref 10–40)
BASOPHILS # BLD AUTO: 0 K/UL — SIGNIFICANT CHANGE UP (ref 0–0.2)
BASOPHILS NFR BLD AUTO: 0 % — SIGNIFICANT CHANGE UP (ref 0–2)
BILIRUB SERPL-MCNC: 0.1 MG/DL — LOW (ref 0.2–1.2)
BUN SERPL-MCNC: 12 MG/DL — SIGNIFICANT CHANGE UP (ref 7–23)
CALCIUM SERPL-MCNC: 8.3 MG/DL — LOW (ref 8.4–10.5)
CHLORIDE SERPL-SCNC: 105 MMOL/L — SIGNIFICANT CHANGE UP (ref 96–108)
CO2 SERPL-SCNC: 24 MMOL/L — SIGNIFICANT CHANGE UP (ref 22–31)
CREAT SERPL-MCNC: 0.58 MG/DL — SIGNIFICANT CHANGE UP (ref 0.5–1.3)
EOSINOPHIL # BLD AUTO: 0 K/UL — SIGNIFICANT CHANGE UP (ref 0–0.5)
EOSINOPHIL NFR BLD AUTO: 0 % — SIGNIFICANT CHANGE UP (ref 0–6)
GLUCOSE SERPL-MCNC: 158 MG/DL — HIGH (ref 70–99)
HCT VFR BLD CALC: 38.3 % — LOW (ref 39–50)
HGB BLD-MCNC: 12.5 G/DL — LOW (ref 13–17)
LDH SERPL L TO P-CCNC: 125 U/L — SIGNIFICANT CHANGE UP (ref 50–242)
LYMPHOCYTES # BLD AUTO: 0.5 K/UL — LOW (ref 1–3.3)
LYMPHOCYTES # BLD AUTO: 7 % — LOW (ref 13–44)
MAGNESIUM SERPL-MCNC: 2 MG/DL — SIGNIFICANT CHANGE UP (ref 1.6–2.6)
MCHC RBC-ENTMCNC: 27.3 PG — SIGNIFICANT CHANGE UP (ref 27–34)
MCHC RBC-ENTMCNC: 32.7 GM/DL — SIGNIFICANT CHANGE UP (ref 32–36)
MCV RBC AUTO: 83.4 FL — SIGNIFICANT CHANGE UP (ref 80–100)
MONOCYTES # BLD AUTO: 0.2 K/UL — SIGNIFICANT CHANGE UP (ref 0–0.9)
MONOCYTES NFR BLD AUTO: 2 % — SIGNIFICANT CHANGE UP (ref 2–14)
NEUTROPHILS # BLD AUTO: 10 K/UL — HIGH (ref 1.8–7.4)
NEUTROPHILS NFR BLD AUTO: 88 % — HIGH (ref 43–77)
PHOSPHATE SERPL-MCNC: 2.8 MG/DL — SIGNIFICANT CHANGE UP (ref 2.5–4.5)
PLATELET # BLD AUTO: 370 K/UL — SIGNIFICANT CHANGE UP (ref 150–400)
POTASSIUM SERPL-MCNC: 4.3 MMOL/L — SIGNIFICANT CHANGE UP (ref 3.5–5.3)
POTASSIUM SERPL-SCNC: 4.3 MMOL/L — SIGNIFICANT CHANGE UP (ref 3.5–5.3)
PROT SERPL-MCNC: 5.4 G/DL — LOW (ref 6–8.3)
RBC # BLD: 4.6 M/UL — SIGNIFICANT CHANGE UP (ref 4.2–5.8)
RBC # FLD: 13.2 % — SIGNIFICANT CHANGE UP (ref 10.3–14.5)
SODIUM SERPL-SCNC: 141 MMOL/L — SIGNIFICANT CHANGE UP (ref 135–145)
URATE SERPL-MCNC: 3.8 MG/DL — SIGNIFICANT CHANGE UP (ref 3.4–8.8)
WBC # BLD: 10.7 K/UL — HIGH (ref 3.8–10.5)
WBC # FLD AUTO: 10.7 K/UL — HIGH (ref 3.8–10.5)

## 2017-11-26 PROCEDURE — 99232 SBSQ HOSP IP/OBS MODERATE 35: CPT

## 2017-11-26 RX ORDER — SODIUM CHLORIDE 9 MG/ML
1000 INJECTION INTRAMUSCULAR; INTRAVENOUS; SUBCUTANEOUS ONCE
Qty: 0 | Refills: 0 | Status: DISCONTINUED | OUTPATIENT
Start: 2017-11-26 | End: 2017-11-26

## 2017-11-26 RX ORDER — CARBOPLATIN 50 MG
750 VIAL (EA) INTRAVENOUS ONCE
Qty: 0 | Refills: 0 | Status: DISCONTINUED | OUTPATIENT
Start: 2017-11-26 | End: 2017-11-27

## 2017-11-26 RX ORDER — SODIUM CHLORIDE 9 MG/ML
1000 INJECTION INTRAMUSCULAR; INTRAVENOUS; SUBCUTANEOUS ONCE
Qty: 0 | Refills: 0 | Status: COMPLETED | OUTPATIENT
Start: 2017-11-26 | End: 2017-11-26

## 2017-11-26 RX ORDER — IFOSFAMIDE 1 G/1
10900 INJECTION, POWDER, LYOPHILIZED, FOR SOLUTION INTRAVENOUS ONCE
Qty: 0 | Refills: 0 | Status: DISCONTINUED | OUTPATIENT
Start: 2017-11-26 | End: 2017-11-27

## 2017-11-26 RX ORDER — SODIUM CHLORIDE 9 MG/ML
1000 INJECTION INTRAMUSCULAR; INTRAVENOUS; SUBCUTANEOUS ONCE
Qty: 0 | Refills: 0 | Status: COMPLETED | OUTPATIENT
Start: 2017-11-27 | End: 2017-11-26

## 2017-11-26 RX ADMIN — SODIUM CHLORIDE 500 MILLILITER(S): 9 INJECTION INTRAMUSCULAR; INTRAVENOUS; SUBCUTANEOUS at 15:30

## 2017-11-26 RX ADMIN — ENOXAPARIN SODIUM 40 MILLIGRAM(S): 100 INJECTION SUBCUTANEOUS at 17:50

## 2017-11-26 RX ADMIN — SODIUM CHLORIDE 75 MILLILITER(S): 9 INJECTION INTRAMUSCULAR; INTRAVENOUS; SUBCUTANEOUS at 22:13

## 2017-11-26 RX ADMIN — SODIUM CHLORIDE 75 MILLILITER(S): 9 INJECTION INTRAMUSCULAR; INTRAVENOUS; SUBCUTANEOUS at 17:51

## 2017-11-26 RX ADMIN — Medication 106 MILLIGRAM(S): at 09:59

## 2017-11-26 RX ADMIN — ONDANSETRON 116 MILLIGRAM(S): 8 TABLET, FILM COATED ORAL at 09:58

## 2017-11-26 NOTE — PROGRESS NOTE ADULT - ASSESSMENT
38 yo M diagnosed with Hodgkin's Lymphoma 1/31/17 Stage IV with invasion into kidney treated with 6 cycles of AVD (doxorubicin, vinblastine, dacarbazine) starting 2/5/2017 and posttreatment PET revealed persistent disease, patient initially hesitant to pursue further treatment but agreed to preeti-aortic lymph node biopsy last week which revealed persistent classical Hodgkin's Lymphoma and is presenting to [facility] ED with worsening symptoms of progressive disease seen on CT scan. Started ICE cycle 1 on 11/25/17

## 2017-11-26 NOTE — PROGRESS NOTE ADULT - PROBLEM SELECTOR PLAN 1
Head Ache Patient with persistent classical Hodgkin's Lymphoma that is causing symptomatic ascites and shortness of breath due to L pleural effusion  -Start ICE cycle 1 today with Etoposide 100mg/m2 daily days 1-3, Carboplatin AUC 5 on day 2, and Ifosfamide continuous infusion on day 2 given with Mesna, the side effects of chemotherapy including but not limited to myelosuppression, sexual side effects, hemorrhagic cystitis, tinnitus were explained to patient and after discussing R/B/A he was agreeable to treatment and signed consent. Patient was offered sperm banking prior to starting chemotherapy which may cause infertility, but he declined.   -currently patient breathing comfortably on room air and will start treatment tomorrow, if respiratory condition were to worsen would consult pulmonology. Will treat with oxygen PRN increased work of breathing or hypoxia for now.  -check HIV, Hepatitis B core Ab, Hepatitis B surface Ab, Hepatitis B surface Ag, Hepatitis C antibody and HIV  - follow up CBC w diff transfuse prn  - CMP, Tumor lysis labs  - daily weight  - mouth care  - antiemetics  -Neulasta 24 hr after discharge Patient with persistent classical Hodgkin's Lymphoma that is causing symptomatic ascites and shortness of breath due to L pleural effusion  Now Cycle 1 of ICE   regimen as follows    Etoposide 100mg/m2 daily days 1-3, Carboplatin AUC 5 on day 2, and Ifosfamide continuous infusion on day 2 given with Mesna, the side effects of chemotherapy including but not limited to myelosuppression, sexual side effects, hemorrhagic cystitis, tinnitus were explained to patient and after discussing R/B/A he was agreeable to treatment and signed consent. Patient was offered sperm banking prior to starting chemotherapy which may cause infertility, but he declined.   -currently patient breathing comfortably on room air and will start treatment tomorrow, if respiratory condition were to worsen would consult pulmonology. Will treat with oxygen PRN increased work of breathing or hypoxia for now.  HIV/Hepatitis panel negative   f/u  CBC w diff transfuse prn  CMP, Tumor lysis labs  Daily weights   Continue mouth care  Continue antiemetics  Neulasta 24 hr after discharge

## 2017-11-26 NOTE — ADVANCED PRACTICE NURSE CONSULT - ASSESSMENT
Labs today WBC 10.7 HGB 12.5 HCT 38.3 PLTS 350 CR.  .58TBILI. .1 . Pt found in bed, alert and oriented x4, v/s stable afebrile, n/c offered. Right chest wall mediport in place. Site without redness or swelling. Lumen previously accessed with + blood return flushes well with NS. Pt premedicated with  Zofran 16 mg IVSS and decadron 12mg ivss prior to chemotherapy. Chemotherapy verified by 2 RNs prior to administration. At 1030 treated with carboplatin AUC 5,= 750 mg ivss over 1 hour. At 1330 treated with Etoposide 100mg/m2= 218mg ivss over 2 hours. At 1530 prehydrated with NS 1 liter over 2 hours prior to Ifosfamide. At 1730 treated with Ifosfamide 5000mg/m2= 41385 with mesna 16297 mg civi. Pt remains in bed with n/c offered. Primary RN aware of treatment plan.

## 2017-11-26 NOTE — PROGRESS NOTE ADULT - ATTENDING COMMENTS
relapsed/refractory Hodgkin's lymphoma, admitted for ICE salvage. Chemo to start today. Supportive care. relapsed/refractory Hodgkin's lymphoma, admitted for ICE salvage, today is day 2, tolerating well. Supportive care.

## 2017-11-26 NOTE — PROGRESS NOTE ADULT - SUBJECTIVE AND OBJECTIVE BOX
Diagnosis:    Protocol/Chemo Regimen:    Day:     Pt endorsed:    Review of Systems:     Pain scale:     Diet:     Allergies    No Known Allergies    Intolerances        ANTIMICROBIALS      HEME/ONC MEDICATIONS  CARBOplatin IVPB 750 milliGRAM(s) IV Intermittent once  enoxaparin Injectable 40 milliGRAM(s) SubCutaneous every 24 hours  etoposide IVPB 218 milliGRAM(s) IV Intermittent daily  ifosfamide IVPB w/additives 70474 milliGRAM(s) IV Intermittent once      STANDING MEDICATIONS  dexamethasone  IVPB 12 milliGRAM(s) IV Intermittent daily  fosaprepitant IVPB 150 milliGRAM(s) IV Intermittent once  ondansetron  IVPB 16 milliGRAM(s) IV Intermittent daily  sodium chloride 0.9% Bolus 1000 milliLiter(s) IV Bolus once  sodium chloride 0.9%. 1000 milliLiter(s) IV Continuous <Continuous>      PRN MEDICATIONS  acetaminophen   Tablet. 650 milliGRAM(s) Oral every 6 hours PRN  ondansetron Injectable 8 milliGRAM(s) IV Push every 8 hours PRN  oxyCODONE    IR 5 milliGRAM(s) Oral every 6 hours PRN        Vital Signs Last 24 Hrs  T(C): 35.6 (26 Nov 2017 05:40), Max: 36.7 (25 Nov 2017 14:05)  T(F): 96 (26 Nov 2017 05:40), Max: 98.1 (25 Nov 2017 14:05)  HR: 83 (26 Nov 2017 05:40) (75 - 104)  BP: 122/83 (26 Nov 2017 05:40) (122/83 - 146/82)  BP(mean): --  RR: 16 (26 Nov 2017 05:40) (16 - 18)  SpO2: 93% (26 Nov 2017 05:40) (93% - 98%)    PHYSICAL EXAM  General: NAD  HEENT: PERRLA, EOMOI, clear oropharynx, anicteric sclera, pink conjunctiva  Neck: supple  CV: (+) S1/S2 RRR  Lungs: clear to auscultation, no wheezes or rales  Abdomen: soft, non-tender, non-distended (+) BS  Ext: no clubbing, cyanosis or edema  Skin: no rashes and no petechiae  Neuro: alert and oriented X 3, no focal deficits  Central Line:     RECENT CULTURES:        LABS:                        12.5   10.7  )-----------( 370      ( 26 Nov 2017 07:38 )             38.3         Mean Cell Volume : 83.4 fl  Mean Cell Hemoglobin : 27.3 pg  Mean Cell Hemoglobin Concentration : 32.7 gm/dL  Auto Neutrophil # : 10.0 K/uL  Auto Lymphocyte # : 0.5 K/uL  Auto Monocyte # : 0.2 K/uL  Auto Eosinophil # : 0.0 K/uL  Auto Basophil # : 0.0 K/uL  Auto Neutrophil % : 88.0 %  Auto Lymphocyte % : 7.0 %  Auto Monocyte % : 2.0 %  Auto Eosinophil % : 0.0 %  Auto Basophil % : 0.0 %      11-26    141  |  105  |  12  ----------------------------<  158<H>  4.3   |  24  |  0.58    Ca    8.3<L>      26 Nov 2017 07:38  Phos  2.8     11-26  Mg     2.0     11-26    TPro  5.4<L>  /  Alb  2.4<L>  /  TBili  0.1<L>  /  DBili  x   /  AST  13  /  ALT  14  /  AlkPhos  94  11-26      Mg 2.0  Phos 2.8      PT/INR - ( 24 Nov 2017 11:52 )   PT: 14.5 sec;   INR: 1.32 ratio         PTT - ( 24 Nov 2017 11:52 )  PTT:33.5 sec      Uric Acid 3.8        RADIOLOGY & ADDITIONAL STUDIES: Diagnosis: Relapse Hodgkin's Lymphoma     Protocol/Chemo Regimen: ICE     Day: 2    Pt endorsed: No complaints     Review of Systems: Patient denied nausea, vomiting, odynophagia, chest pain, cough, dyspnea, abdominal pain, constipation, diarrhea, preeti-rectal pain, rash, fatigue, headache, depression     Pain scale: 0    Diet: Regular     Allergies    No Known Allergies    Intolerances        ANTIMICROBIALS      HEME/ONC MEDICATIONS  CARBOplatin IVPB 750 milliGRAM(s) IV Intermittent once  enoxaparin Injectable 40 milliGRAM(s) SubCutaneous every 24 hours  etoposide IVPB 218 milliGRAM(s) IV Intermittent daily  ifosfamide IVPB w/additives 45139 milliGRAM(s) IV Intermittent once      STANDING MEDICATIONS  dexamethasone  IVPB 12 milliGRAM(s) IV Intermittent daily  fosaprepitant IVPB 150 milliGRAM(s) IV Intermittent once  ondansetron  IVPB 16 milliGRAM(s) IV Intermittent daily  sodium chloride 0.9% Bolus 1000 milliLiter(s) IV Bolus once  sodium chloride 0.9%. 1000 milliLiter(s) IV Continuous <Continuous>      PRN MEDICATIONS  acetaminophen   Tablet. 650 milliGRAM(s) Oral every 6 hours PRN  ondansetron Injectable 8 milliGRAM(s) IV Push every 8 hours PRN  oxyCODONE    IR 5 milliGRAM(s) Oral every 6 hours PRN        Vital Signs Last 24 Hrs  T(C): 35.6 (26 Nov 2017 05:40), Max: 36.7 (25 Nov 2017 14:05)  T(F): 96 (26 Nov 2017 05:40), Max: 98.1 (25 Nov 2017 14:05)  HR: 83 (26 Nov 2017 05:40) (75 - 104)  BP: 122/83 (26 Nov 2017 05:40) (122/83 - 146/82)  BP(mean): --  RR: 16 (26 Nov 2017 05:40) (16 - 18)  SpO2: 93% (26 Nov 2017 05:40) (93% - 98%)    PHYSICAL EXAM  General: NAD  HEENT: PERRLA, EOMOI, clear oropharynx, anicteric sclera, pink conjunctiva  Neck: supple  CV: (+) S1/S2 RRR  Lungs: clear to auscultation, no wheezes or rales  Abdomen: soft, non-tender, non-distended (+) BS  Ext: no clubbing, cyanosis or edema  Skin: no rashes and no petechiae  Neuro: alert and oriented X 3, no focal deficits  Central Line: PIV     RECENT CULTURES:Rapid HIV-1/2 Antibody (11.24.17 @ 16:39)    Rapid HIV-1/2 Antibody: Nonreact: The sensitivity and specificity of the assay are >99%.  A nonreactive result with the OraQuick Advance HIV 1/2 test ([place]) does not preclude previous exposure or infection with  HIV 1/2. Reactive Rapid HIV screen results are confirmed by 4th  generation CMIA and Western Blot Assay. [place] State Law prohibits  redisclosure of this result to any unauthorized party.            LABS:                        12.5   10.7  )-----------( 370      ( 26 Nov 2017 07:38 )             38.3         Mean Cell Volume : 83.4 fl  Mean Cell Hemoglobin : 27.3 pg  Mean Cell Hemoglobin Concentration : 32.7 gm/dL  Auto Neutrophil # : 10.0 K/uL  Auto Lymphocyte # : 0.5 K/uL  Auto Monocyte # : 0.2 K/uL  Auto Eosinophil # : 0.0 K/uL  Auto Basophil # : 0.0 K/uL  Auto Neutrophil % : 88.0 %  Auto Lymphocyte % : 7.0 %  Auto Monocyte % : 2.0 %  Auto Eosinophil % : 0.0 %  Auto Basophil % : 0.0 %      11-26    141  |  105  |  12  ----------------------------<  158<H>  4.3   |  24  |  0.58    Ca    8.3<L>      26 Nov 2017 07:38  Phos  2.8     11-26  Mg     2.0     11-26    TPro  5.4<L>  /  Alb  2.4<L>  /  TBili  0.1<L>  /  DBili  x   /  AST  13  /  ALT  14  /  AlkPhos  94  11-26      Mg 2.0  Phos 2.8      PT/INR - ( 24 Nov 2017 11:52 )   PT: 14.5 sec;   INR: 1.32 ratio         PTT - ( 24 Nov 2017 11:52 )  PTT:33.5 sec      Uric Acid 3.8        RADIOLOGY & ADDITIONAL STUDIES:     < from: CT Abdomen and Pelvis w/ Oral Cont and w/ IV Cont (11.24.17 @ 14:33) >  Extensive mesenteric, retroperitoneal and pelvic adenopathy not   significantly changed from prior abdominal imaging 11/1/2017.    New/enlarging bilateral axillary adenopathy since PET CT 9/16/2017.    Large left pleural effusion with interval increase and questionable left   pleural nodularity. Resultant shift of the mediastinal structures to the   right.  Collapse of the left lower lobe and lingula.        < end of copied text >

## 2017-11-26 NOTE — PROGRESS NOTE ADULT - PROBLEM SELECTOR PLAN 2
- Lovenox for DVT prophylaxis  d/c if PLT < 50 Lovenox for DVT prophylaxis  d/c if PLT < 50  Patient ambulatory

## 2017-11-27 VITALS
OXYGEN SATURATION: 95 % | RESPIRATION RATE: 18 BRPM | SYSTOLIC BLOOD PRESSURE: 122 MMHG | TEMPERATURE: 96 F | DIASTOLIC BLOOD PRESSURE: 79 MMHG | HEART RATE: 87 BPM

## 2017-11-27 LAB
ALBUMIN SERPL ELPH-MCNC: 2.3 G/DL — LOW (ref 3.3–5)
ALP SERPL-CCNC: 86 U/L — SIGNIFICANT CHANGE UP (ref 40–120)
ALT FLD-CCNC: 21 U/L RC — SIGNIFICANT CHANGE UP (ref 10–45)
ANION GAP SERPL CALC-SCNC: 9 MMOL/L — SIGNIFICANT CHANGE UP (ref 5–17)
APTT BLD: 30.2 SEC — SIGNIFICANT CHANGE UP (ref 27.5–37.4)
AST SERPL-CCNC: 17 U/L — SIGNIFICANT CHANGE UP (ref 10–40)
BASOPHILS # BLD AUTO: 0 K/UL — SIGNIFICANT CHANGE UP (ref 0–0.2)
BILIRUB SERPL-MCNC: 0.1 MG/DL — LOW (ref 0.2–1.2)
BLD GP AB SCN SERPL QL: NEGATIVE — SIGNIFICANT CHANGE UP
BUN SERPL-MCNC: 12 MG/DL — SIGNIFICANT CHANGE UP (ref 7–23)
CALCIUM SERPL-MCNC: 8.3 MG/DL — LOW (ref 8.4–10.5)
CHLORIDE SERPL-SCNC: 107 MMOL/L — SIGNIFICANT CHANGE UP (ref 96–108)
CO2 SERPL-SCNC: 25 MMOL/L — SIGNIFICANT CHANGE UP (ref 22–31)
CREAT SERPL-MCNC: 0.66 MG/DL — SIGNIFICANT CHANGE UP (ref 0.5–1.3)
EOSINOPHIL # BLD AUTO: 0 K/UL — SIGNIFICANT CHANGE UP (ref 0–0.5)
GLUCOSE SERPL-MCNC: 127 MG/DL — HIGH (ref 70–99)
HCT VFR BLD CALC: 37.3 % — LOW (ref 39–50)
HGB BLD-MCNC: 11.8 G/DL — LOW (ref 13–17)
INR BLD: 1.28 RATIO — HIGH (ref 0.88–1.16)
LDH SERPL L TO P-CCNC: 98 U/L — SIGNIFICANT CHANGE UP (ref 50–242)
LYMPHOCYTES # BLD AUTO: 0.5 K/UL — LOW (ref 1–3.3)
LYMPHOCYTES # BLD AUTO: 4 % — LOW (ref 13–44)
MAGNESIUM SERPL-MCNC: 2.1 MG/DL — SIGNIFICANT CHANGE UP (ref 1.6–2.6)
MCHC RBC-ENTMCNC: 26.7 PG — LOW (ref 27–34)
MCHC RBC-ENTMCNC: 31.7 GM/DL — LOW (ref 32–36)
MCV RBC AUTO: 84.2 FL — SIGNIFICANT CHANGE UP (ref 80–100)
MONOCYTES # BLD AUTO: 0.6 K/UL — SIGNIFICANT CHANGE UP (ref 0–0.9)
MONOCYTES NFR BLD AUTO: 12 % — SIGNIFICANT CHANGE UP (ref 2–14)
NEUTROPHILS # BLD AUTO: 17.1 K/UL — HIGH (ref 1.8–7.4)
NEUTROPHILS NFR BLD AUTO: 80 % — HIGH (ref 43–77)
PHOSPHATE SERPL-MCNC: 2.9 MG/DL — SIGNIFICANT CHANGE UP (ref 2.5–4.5)
PLATELET # BLD AUTO: 392 K/UL — SIGNIFICANT CHANGE UP (ref 150–400)
POTASSIUM SERPL-MCNC: 3.9 MMOL/L — SIGNIFICANT CHANGE UP (ref 3.5–5.3)
POTASSIUM SERPL-SCNC: 3.9 MMOL/L — SIGNIFICANT CHANGE UP (ref 3.5–5.3)
PROT SERPL-MCNC: 5.2 G/DL — LOW (ref 6–8.3)
PROTHROM AB SERPL-ACNC: 14.5 SEC — HIGH (ref 10–13.1)
RBC # BLD: 4.43 M/UL — SIGNIFICANT CHANGE UP (ref 4.2–5.8)
RBC # FLD: 13.5 % — SIGNIFICANT CHANGE UP (ref 10.3–14.5)
RH IG SCN BLD-IMP: POSITIVE — SIGNIFICANT CHANGE UP
SODIUM SERPL-SCNC: 141 MMOL/L — SIGNIFICANT CHANGE UP (ref 135–145)
TM INTERPRETATION: SIGNIFICANT CHANGE UP
URATE SERPL-MCNC: 3.2 MG/DL — LOW (ref 3.4–8.8)
WBC # BLD: 18.2 K/UL — HIGH (ref 3.8–10.5)
WBC # FLD AUTO: 18.2 K/UL — HIGH (ref 3.8–10.5)

## 2017-11-27 PROCEDURE — 84295 ASSAY OF SERUM SODIUM: CPT

## 2017-11-27 PROCEDURE — 96374 THER/PROPH/DIAG INJ IV PUSH: CPT | Mod: XU

## 2017-11-27 PROCEDURE — 86900 BLOOD TYPING SEROLOGIC ABO: CPT

## 2017-11-27 PROCEDURE — 83735 ASSAY OF MAGNESIUM: CPT

## 2017-11-27 PROCEDURE — 85610 PROTHROMBIN TIME: CPT

## 2017-11-27 PROCEDURE — 85027 COMPLETE CBC AUTOMATED: CPT

## 2017-11-27 PROCEDURE — 83615 LACTATE (LD) (LDH) ENZYME: CPT

## 2017-11-27 PROCEDURE — 71260 CT THORAX DX C+: CPT

## 2017-11-27 PROCEDURE — 82330 ASSAY OF CALCIUM: CPT

## 2017-11-27 PROCEDURE — 85014 HEMATOCRIT: CPT

## 2017-11-27 PROCEDURE — 82435 ASSAY OF BLOOD CHLORIDE: CPT

## 2017-11-27 PROCEDURE — 86803 HEPATITIS C AB TEST: CPT

## 2017-11-27 PROCEDURE — 85730 THROMBOPLASTIN TIME PARTIAL: CPT

## 2017-11-27 PROCEDURE — 83605 ASSAY OF LACTIC ACID: CPT

## 2017-11-27 PROCEDURE — 82803 BLOOD GASES ANY COMBINATION: CPT

## 2017-11-27 PROCEDURE — 99238 HOSP IP/OBS DSCHRG MGMT 30/<: CPT

## 2017-11-27 PROCEDURE — 74177 CT ABD & PELVIS W/CONTRAST: CPT

## 2017-11-27 PROCEDURE — 86704 HEP B CORE ANTIBODY TOTAL: CPT

## 2017-11-27 PROCEDURE — 80053 COMPREHEN METABOLIC PANEL: CPT

## 2017-11-27 PROCEDURE — 84132 ASSAY OF SERUM POTASSIUM: CPT

## 2017-11-27 PROCEDURE — 82947 ASSAY GLUCOSE BLOOD QUANT: CPT

## 2017-11-27 PROCEDURE — 86703 HIV-1/HIV-2 1 RESULT ANTBDY: CPT

## 2017-11-27 PROCEDURE — 84100 ASSAY OF PHOSPHORUS: CPT

## 2017-11-27 PROCEDURE — 84550 ASSAY OF BLOOD/URIC ACID: CPT

## 2017-11-27 PROCEDURE — 93005 ELECTROCARDIOGRAM TRACING: CPT

## 2017-11-27 PROCEDURE — 87340 HEPATITIS B SURFACE AG IA: CPT

## 2017-11-27 PROCEDURE — 86850 RBC ANTIBODY SCREEN: CPT

## 2017-11-27 PROCEDURE — 86901 BLOOD TYPING SEROLOGIC RH(D): CPT

## 2017-11-27 PROCEDURE — 86706 HEP B SURFACE ANTIBODY: CPT

## 2017-11-27 PROCEDURE — 71045 X-RAY EXAM CHEST 1 VIEW: CPT

## 2017-11-27 PROCEDURE — 99285 EMERGENCY DEPT VISIT HI MDM: CPT | Mod: 25

## 2017-11-27 RX ORDER — PHYTONADIONE (VIT K1) 5 MG
5 TABLET ORAL DAILY
Qty: 0 | Refills: 0 | Status: DISCONTINUED | OUTPATIENT
Start: 2017-11-27 | End: 2017-11-27

## 2017-11-27 RX ORDER — LACTULOSE 10 G/15ML
10 SOLUTION ORAL ONCE
Qty: 0 | Refills: 0 | Status: COMPLETED | OUTPATIENT
Start: 2017-11-27 | End: 2017-11-27

## 2017-11-27 RX ADMIN — LACTULOSE 10 GRAM(S): 10 SOLUTION ORAL at 18:11

## 2017-11-27 RX ADMIN — ENOXAPARIN SODIUM 40 MILLIGRAM(S): 100 INJECTION SUBCUTANEOUS at 17:27

## 2017-11-27 RX ADMIN — ONDANSETRON 116 MILLIGRAM(S): 8 TABLET, FILM COATED ORAL at 17:55

## 2017-11-27 RX ADMIN — ONDANSETRON 8 MILLIGRAM(S): 8 TABLET, FILM COATED ORAL at 09:51

## 2017-11-27 RX ADMIN — Medication 106 MILLIGRAM(S): at 14:04

## 2017-11-27 RX ADMIN — Medication 5 MILLIGRAM(S): at 11:06

## 2017-11-27 NOTE — PROGRESS NOTE ADULT - SUBJECTIVE AND OBJECTIVE BOX
Diagnosis: Relapse Hodgkins    Protocol/Chemo Regimen: Cycle 1 ICE     Day: 3    Pt endorsed: No acute complaints     Review of Systems: Patient denied nausea, vomiting, odynophagia, chest pain, cough, dyspnea, abdominal pain, constipation, diarrhea, preeti-rectal pain, rash, fatigue, headache, depression     Pain scale: 0    Diet: Regular     Allergies    No Known Allergies    Intolerances        ANTIMICROBIALS      HEME/ONC MEDICATIONS  CARBOplatin IVPB 750 milliGRAM(s) IV Intermittent once  enoxaparin Injectable 40 milliGRAM(s) SubCutaneous every 24 hours  etoposide IVPB 218 milliGRAM(s) IV Intermittent daily  ifosfamide IVPB w/additives 02217 milliGRAM(s) IV Intermittent once      STANDING MEDICATIONS  dexamethasone  IVPB 12 milliGRAM(s) IV Intermittent daily  ondansetron  IVPB 16 milliGRAM(s) IV Intermittent daily  phytonadione   Solution 5 milliGRAM(s) Oral daily  sodium chloride 0.9%. 1000 milliLiter(s) IV Continuous <Continuous>      PRN MEDICATIONS  acetaminophen   Tablet. 650 milliGRAM(s) Oral every 6 hours PRN  ondansetron Injectable 8 milliGRAM(s) IV Push every 8 hours PRN  oxyCODONE    IR 5 milliGRAM(s) Oral every 6 hours PRN        Vital Signs Last 24 Hrs  T(C): 36.2 (27 Nov 2017 13:35), Max: 36.5 (27 Nov 2017 09:30)  T(F): 97.2 (27 Nov 2017 13:35), Max: 97.7 (27 Nov 2017 09:30)  HR: 90 (27 Nov 2017 13:35) (78 - 93)  BP: 117/75 (27 Nov 2017 13:35) (116/74 - 125/82)  BP(mean): --  RR: 18 (27 Nov 2017 13:35) (18 - 18)  SpO2: 96% (27 Nov 2017 13:35) (93% - 96%)    PHYSICAL EXAM  General: NAD  HEENT: PERRLA, EOMOI, clear oropharynx, anicteric sclera, pink conjunctiva  Neck: supple  CV: (+) S1/S2 RRR  Lungs: clear to auscultation, no wheezes or rales  Abdomen: soft, non-tender, non-distended (+) BS  Ext: no clubbing, cyanosis or edema  Skin: no rashes and no petechiae  Neuro: alert and oriented X 3, no focal deficits  Central Line: PIV     RECENT CULTURES:        LABS:                        11.8   18.2  )-----------( 392      ( 27 Nov 2017 06:44 )             37.3         Mean Cell Volume : 84.2 fl  Mean Cell Hemoglobin : 26.7 pg  Mean Cell Hemoglobin Concentration : 31.7 gm/dL  Auto Neutrophil # : 17.1 K/uL  Auto Lymphocyte # : 0.5 K/uL  Auto Monocyte # : 0.6 K/uL  Auto Eosinophil # : 0.0 K/uL  Auto Basophil # : 0.0 K/uL  Auto Neutrophil % : 80.0 %  Auto Lymphocyte % : 4.0 %  Auto Monocyte % : 12.0 %  Auto Eosinophil % : x  Auto Basophil % : x      11-27    141  |  107  |  12  ----------------------------<  127<H>  3.9   |  25  |  0.66    Ca    8.3<L>      27 Nov 2017 06:44  Phos  2.9     11-27  Mg     2.1     11-27    TPro  5.2<L>  /  Alb  2.3<L>  /  TBili  0.1<L>  /  DBili  x   /  AST  17  /  ALT  21  /  AlkPhos  86  11-27      Mg 2.1  Phos 2.9      PT/INR - ( 27 Nov 2017 07:18 )   PT: 14.5 sec;   INR: 1.28 ratio         PTT - ( 27 Nov 2017 07:18 )  PTT:30.2 sec    LDH 98  Uric Acid 3.2        RADIOLOGY & ADDITIONAL STUDIES:     < from: CT Abdomen and Pelvis w/ Oral Cont and w/ IV Cont (11.24.17 @ 14:33) >  Extensive mesenteric, retroperitoneal and pelvic adenopathy not   significantly changed from prior abdominal imaging 11/1/2017.    New/enlarging bilateral axillary adenopathy since PET CT 9/16/2017.    Large left pleural effusion with interval increase and questionable left   pleural nodularity. Resultant shift of the mediastinal structures to the   right.  Collapse of the left lower lobe and lingula.            < end of copied text >

## 2017-11-27 NOTE — ADVANCED PRACTICE NURSE CONSULT - ASSESSMENT
Lab results as follows: wbc 18.2 hgb 11.8 hct 37.3 platelet count 392. Creatinine level 0.66 Total bili 0.1 Patient have double lumen powerport right chestwall both ports in used patent. Patient has Ifosfamide/mesna in progress via right inner lumen of powerport x 24hours at 57ml/hr. Reinforced teachings to patient re: his chemo regimen well understood. Got 8mg of zofran earlier as antiemetic with relief,patient not due to get zofran 16mg IV(due at 1800). Got dexamethasone 12mg IV x 30minutes at 1400 prior to Etoposide infusion. Etoposide 100mg/y6=060hr/1000ml NSS IV started at 1450 as 2 hour infusion at 505ml/hr via lowest port of NSS line into right chestwall outer powerport. BP stable post 15minutes into the infusion. Patient aware about signs of hypotension to watch. Primary RN aware about plan of care. Safety maintained.

## 2017-11-27 NOTE — PROGRESS NOTE ADULT - PROBLEM SELECTOR PLAN 1
Patient with persistent classical Hodgkin's Lymphoma that is causing symptomatic ascites and shortness of breath due to L pleural effusion  Now Cycle 1 of ICE   regimen as follows    Etoposide 100mg/m2 daily days 1-3, Carboplatin AUC 5 on day 2, and Ifosfamide continuous infusion on day 2 given with Mesna, the side effects of chemotherapy including but not limited to myelosuppression, sexual side effects, hemorrhagic cystitis, tinnitus were explained to patient and after discussing R/B/A he was agreeable to treatment and signed consent. Patient was offered sperm banking prior to starting chemotherapy which may cause infertility, but he declined.   -currently patient breathing comfortably on room air and will start treatment tomorrow, if respiratory condition were to worsen would consult pulmonology. Will treat with oxygen PRN increased work of breathing or hypoxia for now.  HIV/Hepatitis panel negative   f/u  CBC w diff transfuse prn  CMP, Tumor lysis labs  Daily weights   Continue mouth care  Continue antiemetics  Neulasta 24 hr after discharge Patient with persistent classical Hodgkin's Lymphoma that is causing symptomatic ascites and shortness of breath due to L pleural effusion  Now s/p Cycle 1 of ICE   Continue antiemetics as needed at home   Neulasta 24 hr after discharge Patient with Classical Hodgkin's Lymphoma p/w   ascites and shortness of breath due to L pleural effusion  Now s/p Cycle 1 of ICE   with complete resolution of symptoms post chemotherapy   Continue antiemetics as needed at home   Neulasta 24 hr after discharge

## 2017-11-27 NOTE — PROGRESS NOTE ADULT - ASSESSMENT
38 yo M diagnosed with Hodgkin's Lymphoma 1/31/17 Stage IV with invasion into kidney treated with 6 cycles of AVD (doxorubicin, vinblastine, dacarbazine) starting 2/5/2017 and posttreatment PET revealed persistent disease, patient initially hesitant to pursue further treatment but agreed to preeti-aortic lymph node biopsy last week which revealed persistent classical Hodgkin's Lymphoma and is presenting to [facility] ED with worsening symptoms of progressive disease seen on CT scan. Started ICE cycle 1 on 11/25/17

## 2017-11-27 NOTE — ADVANCED PRACTICE NURSE CONSULT - ASSESSMENT
Lab results as follows: wbc 0.2 hgb 6.8 hct 19.7 platelet count 45 Creatinine 0.97 Total bili 0.6. Patient getting 1 unit PRBC right now via NSS line through right arm ac peripheral IV gauge 20. Patent and intact. Reinforced teachings to patient about signs of untoward reactions to watch from PRBC. Verbalized understanding. Reinforced teachings about his chemo regimen well understood. Afebrile. Instructed about importance of walking around with assist and proper handwashing. Compliant. Above PRBC completed at 1300,no reactions. Got 8mg IV x 1 of zofran as antiemetic prior to decitabine. Decitabine 20mg/m2=37mg in 50ml NSS started at 1330 as secondary set via NSS line through right arm ac peripheral IV gauge 20 as 60ml/hr. Patent. primary RN aware of plan of care. Safety maintained.

## 2017-11-27 NOTE — PROGRESS NOTE ADULT - ATTENDING COMMENTS
relapsed/refractory Hodgkin's lymphoma, admitted for ICE salvage, today is day 2, tolerating well. Supportive care. my Tolerating rx well.  Day 3/3 ICE.  Abd pain resolved.  For d/c home, to get Neulasta in [facility], outpt f/u with Dr. Hyacinth Mccallum.

## 2017-11-28 LAB — NON-GYNECOLOGICAL CYTOLOGY STUDY: SIGNIFICANT CHANGE UP

## 2017-11-29 ENCOUNTER — LABORATORY RESULT (OUTPATIENT)
Age: 37
End: 2017-11-29

## 2017-11-29 ENCOUNTER — APPOINTMENT (OUTPATIENT)
Dept: INFUSION THERAPY | Facility: HOSPITAL | Age: 37
End: 2017-11-29

## 2017-11-29 RX ORDER — METOCLOPRAMIDE 10 MG/1
10 TABLET ORAL
Qty: 30 | Refills: 1 | Status: ACTIVE | COMMUNITY
Start: 2017-11-29 | End: 1900-01-01

## 2017-11-30 ENCOUNTER — RESULT REVIEW (OUTPATIENT)
Age: 37
End: 2017-11-30

## 2017-11-30 ENCOUNTER — APPOINTMENT (OUTPATIENT)
Dept: HEMATOLOGY ONCOLOGY | Facility: CLINIC | Age: 37
End: 2017-11-30
Payer: COMMERCIAL

## 2017-11-30 VITALS
RESPIRATION RATE: 16 BRPM | DIASTOLIC BLOOD PRESSURE: 68 MMHG | BODY MASS INDEX: 32.63 KG/M2 | WEIGHT: 227.96 LBS | SYSTOLIC BLOOD PRESSURE: 105 MMHG | HEART RATE: 110 BPM | OXYGEN SATURATION: 97 % | TEMPERATURE: 98.3 F

## 2017-11-30 LAB
EOSINOPHIL # BLD AUTO: 0.1 K/UL — SIGNIFICANT CHANGE UP (ref 0–0.5)
EOSINOPHIL NFR BLD AUTO: 1 % — SIGNIFICANT CHANGE UP (ref 0–6)
HCT VFR BLD CALC: 38.4 % — LOW (ref 39–50)
HGB BLD-MCNC: 13.1 G/DL — SIGNIFICANT CHANGE UP (ref 13–17)
LYMPHOCYTES # BLD AUTO: 0.5 K/UL — LOW (ref 1–3.3)
LYMPHOCYTES # BLD AUTO: 5 % — LOW (ref 13–44)
MCHC RBC-ENTMCNC: 27.6 PG — SIGNIFICANT CHANGE UP (ref 27–34)
MCHC RBC-ENTMCNC: 34.1 G/DL — SIGNIFICANT CHANGE UP (ref 32–36)
MCV RBC AUTO: 81.1 FL — SIGNIFICANT CHANGE UP (ref 80–100)
MONOCYTES # BLD AUTO: 0.1 K/UL — SIGNIFICANT CHANGE UP (ref 0–0.9)
MONOCYTES NFR BLD AUTO: 3 % — SIGNIFICANT CHANGE UP (ref 2–14)
NEUTROPHILS # BLD AUTO: 30.7 K/UL — HIGH (ref 1.8–7.4)
NEUTROPHILS NFR BLD AUTO: 91 % — HIGH (ref 43–77)
PLAT MORPH BLD: NORMAL — SIGNIFICANT CHANGE UP
PLATELET # BLD AUTO: 334 K/UL — SIGNIFICANT CHANGE UP (ref 150–400)
RBC # BLD: 4.73 M/UL — SIGNIFICANT CHANGE UP (ref 4.2–5.8)
RBC # FLD: 13.7 % — SIGNIFICANT CHANGE UP (ref 10.3–14.5)
RBC BLD AUTO: SIGNIFICANT CHANGE UP
WBC # BLD: 31.5 K/UL — HIGH (ref 3.8–10.5)
WBC # FLD AUTO: 31.5 K/UL — HIGH (ref 3.8–10.5)

## 2017-11-30 PROCEDURE — 99214 OFFICE O/P EST MOD 30 MIN: CPT

## 2017-11-30 RX ORDER — ONDANSETRON 8 MG/1
8 TABLET ORAL
Qty: 30 | Refills: 3 | Status: ACTIVE | COMMUNITY
Start: 2017-11-30 | End: 1900-01-01

## 2017-12-04 ENCOUNTER — OUTPATIENT (OUTPATIENT)
Dept: OUTPATIENT SERVICES | Facility: HOSPITAL | Age: 37
LOS: 1 days | End: 2017-12-04

## 2017-12-04 ENCOUNTER — APPOINTMENT (OUTPATIENT)
Dept: INFUSION THERAPY | Facility: HOSPITAL | Age: 37
End: 2017-12-04

## 2017-12-04 ENCOUNTER — RESULT REVIEW (OUTPATIENT)
Age: 37
End: 2017-12-04

## 2017-12-04 DIAGNOSIS — C81.90 HODGKIN LYMPHOMA, UNSPECIFIED, UNSPECIFIED SITE: ICD-10-CM

## 2017-12-04 LAB
BASOPHILS # BLD AUTO: 0 K/UL — SIGNIFICANT CHANGE UP (ref 0–0.2)
BASOPHILS NFR BLD AUTO: 0.4 % — SIGNIFICANT CHANGE UP (ref 0–2)
EOSINOPHIL # BLD AUTO: 0.1 K/UL — SIGNIFICANT CHANGE UP (ref 0–0.5)
EOSINOPHIL NFR BLD AUTO: 2.7 % — SIGNIFICANT CHANGE UP (ref 0–6)
HCT VFR BLD CALC: 38.9 % — LOW (ref 39–50)
HGB BLD-MCNC: 13 G/DL — SIGNIFICANT CHANGE UP (ref 13–17)
LYMPHOCYTES # BLD AUTO: 0.9 K/UL — LOW (ref 1–3.3)
LYMPHOCYTES # BLD AUTO: 18.9 % — SIGNIFICANT CHANGE UP (ref 13–44)
MCHC RBC-ENTMCNC: 26.8 PG — LOW (ref 27–34)
MCHC RBC-ENTMCNC: 33.5 G/DL — SIGNIFICANT CHANGE UP (ref 32–36)
MCV RBC AUTO: 80.2 FL — SIGNIFICANT CHANGE UP (ref 80–100)
MONOCYTES # BLD AUTO: 0.7 K/UL — SIGNIFICANT CHANGE UP (ref 0–0.9)
MONOCYTES NFR BLD AUTO: 15 % — HIGH (ref 2–14)
NEUTROPHILS # BLD AUTO: 3.1 K/UL — SIGNIFICANT CHANGE UP (ref 1.8–7.4)
NEUTROPHILS NFR BLD AUTO: 63 % — SIGNIFICANT CHANGE UP (ref 43–77)
PLATELET # BLD AUTO: 265 K/UL — SIGNIFICANT CHANGE UP (ref 150–400)
RBC # BLD: 4.85 M/UL — SIGNIFICANT CHANGE UP (ref 4.2–5.8)
RBC # FLD: 14 % — SIGNIFICANT CHANGE UP (ref 10.3–14.5)
WBC # BLD: 4.9 K/UL — SIGNIFICANT CHANGE UP (ref 3.8–10.5)
WBC # FLD AUTO: 4.9 K/UL — SIGNIFICANT CHANGE UP (ref 3.8–10.5)

## 2017-12-05 DIAGNOSIS — Z51.89 ENCOUNTER FOR OTHER SPECIFIED AFTERCARE: ICD-10-CM

## 2017-12-07 ENCOUNTER — OUTPATIENT (OUTPATIENT)
Dept: OUTPATIENT SERVICES | Facility: HOSPITAL | Age: 37
LOS: 1 days | Discharge: HOME | End: 2017-12-07

## 2017-12-07 DIAGNOSIS — C81.90 HODGKIN LYMPHOMA, UNSPECIFIED, UNSPECIFIED SITE: ICD-10-CM

## 2017-12-08 ENCOUNTER — APPOINTMENT (OUTPATIENT)
Dept: HEMATOLOGY ONCOLOGY | Facility: CLINIC | Age: 37
End: 2017-12-08

## 2017-12-08 ENCOUNTER — RESULT REVIEW (OUTPATIENT)
Age: 37
End: 2017-12-08

## 2017-12-08 LAB
EOSINOPHIL # BLD AUTO: 0.2 K/UL — SIGNIFICANT CHANGE UP (ref 0–0.5)
EOSINOPHIL NFR BLD AUTO: 1 % — SIGNIFICANT CHANGE UP (ref 0–6)
HCT VFR BLD CALC: 38.7 % — LOW (ref 39–50)
HGB BLD-MCNC: 12.9 G/DL — LOW (ref 13–17)
LYMPHOCYTES # BLD AUTO: 1.1 K/UL — SIGNIFICANT CHANGE UP (ref 1–3.3)
LYMPHOCYTES # BLD AUTO: 6 % — LOW (ref 13–44)
MCHC RBC-ENTMCNC: 27 PG — SIGNIFICANT CHANGE UP (ref 27–34)
MCHC RBC-ENTMCNC: 33.3 G/DL — SIGNIFICANT CHANGE UP (ref 32–36)
MCV RBC AUTO: 81 FL — SIGNIFICANT CHANGE UP (ref 80–100)
METAMYELOCYTES # FLD: 2 % — HIGH (ref 0–0)
MONOCYTES # BLD AUTO: 1.2 K/UL — HIGH (ref 0–0.9)
MONOCYTES NFR BLD AUTO: 10 % — SIGNIFICANT CHANGE UP (ref 2–14)
MYELOCYTES NFR BLD: 4 % — HIGH (ref 0–0)
NEUTROPHILS # BLD AUTO: 27.1 K/UL — HIGH (ref 1.8–7.4)
NEUTROPHILS NFR BLD AUTO: 75 % — SIGNIFICANT CHANGE UP (ref 43–77)
NEUTS BAND # BLD: 2 % — SIGNIFICANT CHANGE UP (ref 0–8)
PLAT MORPH BLD: NORMAL — SIGNIFICANT CHANGE UP
PLATELET # BLD AUTO: 144 K/UL — LOW (ref 150–400)
RBC # BLD: 4.77 M/UL — SIGNIFICANT CHANGE UP (ref 4.2–5.8)
RBC # FLD: 15.3 % — HIGH (ref 10.3–14.5)
RBC BLD AUTO: SIGNIFICANT CHANGE UP
WBC # BLD: 29.7 K/UL — HIGH (ref 3.8–10.5)
WBC # FLD AUTO: 29.7 K/UL — HIGH (ref 3.8–10.5)

## 2017-12-09 ENCOUNTER — APPOINTMENT (OUTPATIENT)
Dept: INFUSION THERAPY | Facility: HOSPITAL | Age: 37
End: 2017-12-09

## 2017-12-11 ENCOUNTER — RESULT REVIEW (OUTPATIENT)
Age: 37
End: 2017-12-11

## 2017-12-11 ENCOUNTER — APPOINTMENT (OUTPATIENT)
Dept: INFUSION THERAPY | Facility: HOSPITAL | Age: 37
End: 2017-12-11

## 2017-12-11 LAB
BASOPHILS # BLD AUTO: 0.1 K/UL — SIGNIFICANT CHANGE UP (ref 0–0.2)
BASOPHILS NFR BLD AUTO: 0.3 % — SIGNIFICANT CHANGE UP (ref 0–2)
EOSINOPHIL # BLD AUTO: 0.2 K/UL — SIGNIFICANT CHANGE UP (ref 0–0.5)
EOSINOPHIL NFR BLD AUTO: 0.9 % — SIGNIFICANT CHANGE UP (ref 0–6)
HCT VFR BLD CALC: 40.9 % — SIGNIFICANT CHANGE UP (ref 39–50)
HGB BLD-MCNC: 13.8 G/DL — SIGNIFICANT CHANGE UP (ref 13–17)
LYMPHOCYTES # BLD AUTO: 1.4 K/UL — SIGNIFICANT CHANGE UP (ref 1–3.3)
LYMPHOCYTES # BLD AUTO: 7 % — LOW (ref 13–44)
MCHC RBC-ENTMCNC: 27.2 PG — SIGNIFICANT CHANGE UP (ref 27–34)
MCHC RBC-ENTMCNC: 33.8 G/DL — SIGNIFICANT CHANGE UP (ref 32–36)
MCV RBC AUTO: 80.3 FL — SIGNIFICANT CHANGE UP (ref 80–100)
MONOCYTES # BLD AUTO: 0.7 K/UL — SIGNIFICANT CHANGE UP (ref 0–0.9)
MONOCYTES NFR BLD AUTO: 3.3 % — SIGNIFICANT CHANGE UP (ref 2–14)
NEUTROPHILS # BLD AUTO: 17.3 K/UL — HIGH (ref 1.8–7.4)
NEUTROPHILS NFR BLD AUTO: 88.4 % — HIGH (ref 43–77)
PLATELET # BLD AUTO: 109 K/UL — LOW (ref 150–400)
RBC # BLD: 5.1 M/UL — SIGNIFICANT CHANGE UP (ref 4.2–5.8)
RBC # FLD: 15.7 % — HIGH (ref 10.3–14.5)
WBC # BLD: 19.6 K/UL — HIGH (ref 3.8–10.5)
WBC # FLD AUTO: 19.6 K/UL — HIGH (ref 3.8–10.5)

## 2017-12-14 ENCOUNTER — RESULT REVIEW (OUTPATIENT)
Age: 37
End: 2017-12-14

## 2017-12-14 ENCOUNTER — APPOINTMENT (OUTPATIENT)
Dept: HEMATOLOGY ONCOLOGY | Facility: CLINIC | Age: 37
End: 2017-12-14
Payer: COMMERCIAL

## 2017-12-14 ENCOUNTER — APPOINTMENT (OUTPATIENT)
Dept: INFUSION THERAPY | Facility: HOSPITAL | Age: 37
End: 2017-12-14

## 2017-12-14 VITALS
DIASTOLIC BLOOD PRESSURE: 70 MMHG | BODY MASS INDEX: 30.14 KG/M2 | RESPIRATION RATE: 16 BRPM | OXYGEN SATURATION: 99 % | WEIGHT: 210.54 LBS | SYSTOLIC BLOOD PRESSURE: 130 MMHG | TEMPERATURE: 98 F | HEART RATE: 86 BPM

## 2017-12-14 DIAGNOSIS — C81.90 HODGKIN LYMPHOMA, UNSPECIFIED, UNSPECIFIED SITE: ICD-10-CM

## 2017-12-14 DIAGNOSIS — J90 PLEURAL EFFUSION, NOT ELSEWHERE CLASSIFIED: ICD-10-CM

## 2017-12-14 LAB
BASOPHILS # BLD AUTO: 0 K/UL — SIGNIFICANT CHANGE UP (ref 0–0.2)
BASOPHILS NFR BLD AUTO: 0.3 % — SIGNIFICANT CHANGE UP (ref 0–2)
BUN SERPL-MCNC: 12 MG/DL — SIGNIFICANT CHANGE UP (ref 7–23)
CA-I BLDA-SCNC: 1.17 MMOL/L — SIGNIFICANT CHANGE UP (ref 1.12–1.3)
CHLORIDE SERPL-SCNC: 102 MMOL/L — SIGNIFICANT CHANGE UP (ref 96–108)
CO2 SERPL-SCNC: 27 MMOL/L — SIGNIFICANT CHANGE UP (ref 22–31)
CREAT SERPL-MCNC: 0.7 MG/DL — SIGNIFICANT CHANGE UP (ref 0.5–1.3)
EOSINOPHIL # BLD AUTO: 0.1 K/UL — SIGNIFICANT CHANGE UP (ref 0–0.5)
EOSINOPHIL NFR BLD AUTO: 0.9 % — SIGNIFICANT CHANGE UP (ref 0–6)
GLUCOSE SERPL-MCNC: 72 MG/DL — SIGNIFICANT CHANGE UP (ref 70–99)
HCT VFR BLD CALC: 38.1 % — LOW (ref 39–50)
HGB BLD-MCNC: 13.1 G/DL — SIGNIFICANT CHANGE UP (ref 13–17)
LYMPHOCYTES # BLD AUTO: 1.1 K/UL — SIGNIFICANT CHANGE UP (ref 1–3.3)
LYMPHOCYTES # BLD AUTO: 9.4 % — LOW (ref 13–44)
MCHC RBC-ENTMCNC: 27.8 PG — SIGNIFICANT CHANGE UP (ref 27–34)
MCHC RBC-ENTMCNC: 34.3 G/DL — SIGNIFICANT CHANGE UP (ref 32–36)
MCV RBC AUTO: 80.9 FL — SIGNIFICANT CHANGE UP (ref 80–100)
MONOCYTES # BLD AUTO: 0.7 K/UL — SIGNIFICANT CHANGE UP (ref 0–0.9)
MONOCYTES NFR BLD AUTO: 5.7 % — SIGNIFICANT CHANGE UP (ref 2–14)
NEUTROPHILS # BLD AUTO: 9.6 K/UL — HIGH (ref 1.8–7.4)
NEUTROPHILS NFR BLD AUTO: 83.6 % — HIGH (ref 43–77)
PLATELET # BLD AUTO: 150 K/UL — SIGNIFICANT CHANGE UP (ref 150–400)
POTASSIUM SERPL-MCNC: 3.9 MMOL/L — SIGNIFICANT CHANGE UP (ref 3.5–5.3)
POTASSIUM SERPL-SCNC: 3.9 MMOL/L — SIGNIFICANT CHANGE UP (ref 3.5–5.3)
RBC # BLD: 4.71 M/UL — SIGNIFICANT CHANGE UP (ref 4.2–5.8)
RBC # FLD: 15.5 % — HIGH (ref 10.3–14.5)
SODIUM SERPL-SCNC: 140 MMOL/L — SIGNIFICANT CHANGE UP (ref 135–145)
WBC # BLD: 11.4 K/UL — HIGH (ref 3.8–10.5)
WBC # FLD AUTO: 11.4 K/UL — HIGH (ref 3.8–10.5)

## 2017-12-14 PROCEDURE — 99214 OFFICE O/P EST MOD 30 MIN: CPT

## 2017-12-14 PROCEDURE — XXXXX: CPT

## 2017-12-15 ENCOUNTER — INPATIENT (INPATIENT)
Facility: HOSPITAL | Age: 37
LOS: 0 days | Discharge: HOME | DRG: 847 | End: 2017-12-16
Attending: STUDENT IN AN ORGANIZED HEALTH CARE EDUCATION/TRAINING PROGRAM | Admitting: STUDENT IN AN ORGANIZED HEALTH CARE EDUCATION/TRAINING PROGRAM
Payer: COMMERCIAL

## 2017-12-15 VITALS
HEART RATE: 73 BPM | WEIGHT: 209 LBS | OXYGEN SATURATION: 97 % | TEMPERATURE: 98 F | HEIGHT: 70 IN | DIASTOLIC BLOOD PRESSURE: 72 MMHG | SYSTOLIC BLOOD PRESSURE: 109 MMHG | RESPIRATION RATE: 16 BRPM

## 2017-12-15 DIAGNOSIS — C81.96: ICD-10-CM

## 2017-12-15 DIAGNOSIS — C81.90 HODGKIN LYMPHOMA, UNSPECIFIED, UNSPECIFIED SITE: ICD-10-CM

## 2017-12-15 DIAGNOSIS — R11.2 NAUSEA WITH VOMITING, UNSPECIFIED: ICD-10-CM

## 2017-12-15 DIAGNOSIS — Z51.11 ENCOUNTER FOR ANTINEOPLASTIC CHEMOTHERAPY: ICD-10-CM

## 2017-12-15 LAB
ALBUMIN SERPL ELPH-MCNC: 3 G/DL — LOW (ref 3.3–5)
ALP SERPL-CCNC: 114 U/L — SIGNIFICANT CHANGE UP (ref 40–120)
ALT FLD-CCNC: 75 U/L RC — HIGH (ref 10–45)
APTT BLD: 113.1 SEC — HIGH (ref 27.5–37.4)
AST SERPL-CCNC: 31 U/L — SIGNIFICANT CHANGE UP (ref 10–40)
BASOPHILS # BLD AUTO: 0 K/UL — SIGNIFICANT CHANGE UP (ref 0–0.2)
BILIRUB SERPL-MCNC: 0.1 MG/DL — LOW (ref 0.2–1.2)
BLD GP AB SCN SERPL QL: NEGATIVE — SIGNIFICANT CHANGE UP
BUN SERPL-MCNC: 14 MG/DL — SIGNIFICANT CHANGE UP (ref 7–23)
CALCIUM SERPL-MCNC: 8.3 MG/DL — LOW (ref 8.4–10.5)
CHLORIDE SERPL-SCNC: 103 MMOL/L — SIGNIFICANT CHANGE UP (ref 96–108)
CO2 SERPL-SCNC: 26 MMOL/L — SIGNIFICANT CHANGE UP (ref 22–31)
CREAT SERPL-MCNC: 0.67 MG/DL — SIGNIFICANT CHANGE UP (ref 0.5–1.3)
EOSINOPHIL # BLD AUTO: 0 K/UL — SIGNIFICANT CHANGE UP (ref 0–0.5)
FIBRINOGEN PPP-MCNC: 627 MG/DL — HIGH (ref 310–510)
GLUCOSE SERPL-MCNC: 138 MG/DL — HIGH (ref 70–99)
HCT VFR BLD CALC: 37.2 % — LOW (ref 39–50)
HGB BLD-MCNC: 12.3 G/DL — LOW (ref 13–17)
INR BLD: 1.13 RATIO — SIGNIFICANT CHANGE UP (ref 0.88–1.16)
LDH SERPL L TO P-CCNC: 197 U/L — SIGNIFICANT CHANGE UP (ref 50–242)
LYMPHOCYTES # BLD AUTO: 1 K/UL — SIGNIFICANT CHANGE UP (ref 1–3.3)
LYMPHOCYTES # BLD AUTO: 4 % — LOW (ref 13–44)
MAGNESIUM SERPL-MCNC: 2.1 MG/DL — SIGNIFICANT CHANGE UP (ref 1.6–2.6)
MCHC RBC-ENTMCNC: 27.2 PG — SIGNIFICANT CHANGE UP (ref 27–34)
MCHC RBC-ENTMCNC: 33.2 GM/DL — SIGNIFICANT CHANGE UP (ref 32–36)
MCV RBC AUTO: 82.2 FL — SIGNIFICANT CHANGE UP (ref 80–100)
MONOCYTES # BLD AUTO: 0.8 K/UL — SIGNIFICANT CHANGE UP (ref 0–0.9)
MONOCYTES NFR BLD AUTO: 5 % — SIGNIFICANT CHANGE UP (ref 2–14)
NEUTROPHILS # BLD AUTO: 19.6 K/UL — HIGH (ref 1.8–7.4)
NEUTROPHILS NFR BLD AUTO: 89 % — HIGH (ref 43–77)
NEUTS BAND # BLD: 2 % — SIGNIFICANT CHANGE UP (ref 0–8)
PHOSPHATE SERPL-MCNC: 2.8 MG/DL — SIGNIFICANT CHANGE UP (ref 2.5–4.5)
PLAT MORPH BLD: NORMAL — SIGNIFICANT CHANGE UP
PLATELET # BLD AUTO: 198 K/UL — SIGNIFICANT CHANGE UP (ref 150–400)
POTASSIUM SERPL-MCNC: 3.7 MMOL/L — SIGNIFICANT CHANGE UP (ref 3.5–5.3)
POTASSIUM SERPL-SCNC: 3.7 MMOL/L — SIGNIFICANT CHANGE UP (ref 3.5–5.3)
PROT SERPL-MCNC: 5.8 G/DL — LOW (ref 6–8.3)
PROTHROM AB SERPL-ACNC: 12.2 SEC — SIGNIFICANT CHANGE UP (ref 9.8–12.7)
RBC # BLD: 4.53 M/UL — SIGNIFICANT CHANGE UP (ref 4.2–5.8)
RBC # FLD: 15.3 % — HIGH (ref 10.3–14.5)
RBC BLD AUTO: NORMAL — SIGNIFICANT CHANGE UP
RH IG SCN BLD-IMP: POSITIVE — SIGNIFICANT CHANGE UP
SODIUM SERPL-SCNC: 138 MMOL/L — SIGNIFICANT CHANGE UP (ref 135–145)
URATE SERPL-MCNC: 4.2 MG/DL — SIGNIFICANT CHANGE UP (ref 3.4–8.8)
WBC # BLD: 21.4 K/UL — HIGH (ref 3.8–10.5)
WBC # FLD AUTO: 21.4 K/UL — HIGH (ref 3.8–10.5)

## 2017-12-15 PROCEDURE — 99254 IP/OBS CNSLTJ NEW/EST MOD 60: CPT | Mod: GC

## 2017-12-15 PROCEDURE — 99223 1ST HOSP IP/OBS HIGH 75: CPT

## 2017-12-15 RX ORDER — ALLOPURINOL 300 MG
300 TABLET ORAL DAILY
Qty: 0 | Refills: 0 | Status: DISCONTINUED | OUTPATIENT
Start: 2017-12-15 | End: 2017-12-16

## 2017-12-15 RX ORDER — CARBOPLATIN 50 MG
750 VIAL (EA) INTRAVENOUS ONCE
Qty: 0 | Refills: 0 | Status: DISCONTINUED | OUTPATIENT
Start: 2017-12-15 | End: 2017-12-16

## 2017-12-15 RX ORDER — METOCLOPRAMIDE HCL 10 MG
10 TABLET ORAL EVERY 6 HOURS
Qty: 0 | Refills: 0 | Status: DISCONTINUED | OUTPATIENT
Start: 2017-12-15 | End: 2017-12-16

## 2017-12-15 RX ORDER — SODIUM CHLORIDE 9 MG/ML
1000 INJECTION INTRAMUSCULAR; INTRAVENOUS; SUBCUTANEOUS
Qty: 0 | Refills: 0 | Status: DISCONTINUED | OUTPATIENT
Start: 2017-12-15 | End: 2017-12-16

## 2017-12-15 RX ORDER — IFOSFAMIDE 1 G/1
10650 INJECTION, POWDER, LYOPHILIZED, FOR SOLUTION INTRAVENOUS ONCE
Qty: 0 | Refills: 0 | Status: DISCONTINUED | OUTPATIENT
Start: 2017-12-15 | End: 2017-12-16

## 2017-12-15 RX ORDER — FOSAPREPITANT DIMEGLUMINE 150 MG/5ML
150 INJECTION, POWDER, LYOPHILIZED, FOR SOLUTION INTRAVENOUS ONCE
Qty: 0 | Refills: 0 | Status: COMPLETED | OUTPATIENT
Start: 2017-12-15 | End: 2017-12-15

## 2017-12-15 RX ORDER — ETOPOSIDE 20 MG/ML
213 VIAL (ML) INTRAVENOUS DAILY
Qty: 0 | Refills: 0 | Status: DISCONTINUED | OUTPATIENT
Start: 2017-12-15 | End: 2017-12-15

## 2017-12-15 RX ORDER — METOCLOPRAMIDE HCL 10 MG
1 TABLET ORAL
Qty: 0 | Refills: 0 | COMMUNITY

## 2017-12-15 RX ORDER — ETOPOSIDE 20 MG/ML
213 VIAL (ML) INTRAVENOUS ONCE
Qty: 0 | Refills: 0 | Status: DISCONTINUED | OUTPATIENT
Start: 2017-12-15 | End: 2017-12-16

## 2017-12-15 RX ORDER — ENOXAPARIN SODIUM 100 MG/ML
40 INJECTION SUBCUTANEOUS EVERY 24 HOURS
Qty: 0 | Refills: 0 | Status: DISCONTINUED | OUTPATIENT
Start: 2017-12-15 | End: 2017-12-16

## 2017-12-15 RX ORDER — CARBOPLATIN 50 MG
750 VIAL (EA) INTRAVENOUS ONCE
Qty: 0 | Refills: 0 | Status: DISCONTINUED | OUTPATIENT
Start: 2017-12-15 | End: 2017-12-15

## 2017-12-15 RX ORDER — DEXAMETHASONE 0.5 MG/5ML
8 ELIXIR ORAL ONCE
Qty: 0 | Refills: 0 | Status: COMPLETED | OUTPATIENT
Start: 2017-12-15 | End: 2017-12-15

## 2017-12-15 RX ADMIN — Medication 101.6 MILLIGRAM(S): at 15:44

## 2017-12-15 RX ADMIN — FOSAPREPITANT DIMEGLUMINE 300 MILLIGRAM(S): 150 INJECTION, POWDER, LYOPHILIZED, FOR SOLUTION INTRAVENOUS at 14:56

## 2017-12-15 RX ADMIN — Medication 300 MILLIGRAM(S): at 21:11

## 2017-12-15 RX ADMIN — Medication 10 MILLIGRAM(S): at 21:11

## 2017-12-15 NOTE — PROGRESS NOTE ADULT - PROBLEM SELECTOR PLAN 1
to get cycle 2 ICE here. D2 today, got etoposide yesterday 12/14/17.  IVF  allopurinol 300mg oral daily  TLS labs daily monitor ( LDH, uric acid, cmp, phos, mag)  check cbc with diff daily   treated with 6 cycles of AVD (doxorubicin, vinblastine, dacarbazine) starting in Feb 2017, with posttreatment PET revealing persistent disease, and with preeti-aortic lymph node biopsy, revealing persistent classical Hodgkin's Lymphoma, who was started on Cycle 1 of ICE on 11/25. to get cycle 2 ICE here. D2 today, got etoposide yesterday 12/14/17.  IVF  allopurinol 300mg oral daily  TLS labs daily monitor ( LDH, uric acid, cmp, phos, mag)  check cbc with diff daily   treated with 6 cycles of AVD (doxorubicin, vinblastine, dacarbazine, did not get Bleo because of restrictive lung dx) starting in Feb 2017, with posttreatment PET revealing persistent disease, and with preeti-aortic lymph node biopsy, revealing persistent classical Hodgkin's Lymphoma, who was started on Cycle 1 of ICE on 11/25.

## 2017-12-15 NOTE — ADVANCED PRACTICE NURSE CONSULT - ASSESSMENT
Pt with day 2/3 tx, labs noted in sunrise, height, weight and bsa verified okay to proceed with tx as per MD Ragsdale.  Pt with right chest wall double powerport, + blood return noted, no pain, redness or swelling noted at site.  Pt premedicated with emend 150 mg iv x 1 and dexamethasone 8 mg iv x 1.  Pt administered carboplatin (AUC 5) = 750 mg ivss over 1 hour via locked pump.  Pt administered ifosfamide 5000 mg/m2 = 10,650 mg civi at 55 ml/hr with mesna 5,000 mg/m2 = 10,650 mg civi at 55 ml/hr via locked pump.  Pt administered etoposide 100 mg/m2 = 213 mg ivss over 2 hours day 2 via locked pump.  Pt and spouse educated on chemo regimen and signs and symptoms to report on to area nurse and staff, pt and spouse verbalized understanding.  Written material provided.  Emotional support provided. Report given to area nurse.

## 2017-12-15 NOTE — H&P ADULT - REASON FOR ADMISSION
here for completion of second cycle of chemotherapy direct admission for completion of second cycle of chemotherapy

## 2017-12-15 NOTE — H&P ADULT - NSHPLABSRESULTS_GEN_ALL_CORE
Labs from current hospitalization pending.     Recent lab work drawn yesterday, 12/14/17, were personally reviewed; pertinent findings include:  - neutrophilic leukocytosis of 11.4, WNL rest of CBC and BMP

## 2017-12-15 NOTE — H&P ADULT - NSHPPHYSICALEXAM_GEN_ALL_CORE
Vital Signs Last 24 Hrs  T(C): 36.8 (15 Dec 2017 12:08), Max: 36.8 (15 Dec 2017 12:08)  T(F): 98.2 (15 Dec 2017 12:08), Max: 98.2 (15 Dec 2017 12:08)  HR: 73 (15 Dec 2017 12:08) (73 - 76)  BP: 109/72 (15 Dec 2017 12:08) (103/67 - 109/72)  RR: 16 (15 Dec 2017 12:08) (16 - 16)  SpO2: 97% (15 Dec 2017 12:08) (97% - 97%)    right subclavian vascular access port without surrounding warmth/erythema/swelling/pain Vital Signs Last 24 Hrs  T(C): 36.8 (15 Dec 2017 12:08), Max: 36.8 (15 Dec 2017 12:08)  T(F): 98.2 (15 Dec 2017 12:08), Max: 98.2 (15 Dec 2017 12:08)  HR: 73 (15 Dec 2017 12:08) (73 - 76)  BP: 109/72 (15 Dec 2017 12:08) (103/67 - 109/72)  RR: 16 (15 Dec 2017 12:08) (16 - 16)  SpO2: 97% (15 Dec 2017 12:08) (97% - 97%)    GEN: young male, laying in bed, in NAD  SKIN: intact, no e/o rash; right subclavian vascular access port without surrounding warmth/erythema/swelling/pain  EYES: PERRL, anicteric  HEAD: NC/AT  NECK: supple, no e/o elevated JVP appreciated  RESPI: no accessory muscle use, patient able to speak in full sentences, B/L air entry, CTAB  CARDIO: no murmurs appreciated on auscultation, no LE edema B/L  ABD: soft, NT to deep palpation in all quadrants, ND, +BS  NEURO: A&Ox3  EXT: pt able to move all extremities spontaneously  VASC: peripheral pulses palpated B/L

## 2017-12-15 NOTE — PROGRESS NOTE ADULT - SUBJECTIVE AND OBJECTIVE BOX
HPI:  37yoM diagnosed in Jan 2017 with Stage IV Hodgkin's Lymphoma of intrapelvic lymph nodes with invasion into the kidney, treated with 6 cycles of AVD (doxorubicin, vinblastine, dacarbazine) starting in Feb 2017, with posttreatment PET revealing persistent disease, and with preeti-aortic lymph node biopsy, revealing persistent classical Hodgkin's Lymphoma, who was started on Cycle 1 of ICE on 11/25. He received his first dose of Etoposide for Cycle 2 of ICE on 12/14/17.    He  says he feels a little nautious now but otherwise feels ok.     Presenting Vitals: 98.2F, 73bpm, 109/72, 16br/m, 97% on RA (15 Dec 2017 12:03)      PAST MEDICAL & SURGICAL HISTORY:  Pleural effusion  Lymphoma  No significant past surgical history      Allergies    No Known Allergies    Intolerances        MEDICATIONS  (STANDING):  CARBOplatin IVPB 750 milliGRAM(s) IV Intermittent once  enoxaparin Injectable 40 milliGRAM(s) SubCutaneous every 24 hours  etoposide IVPB 213 milliGRAM(s) IV Intermittent once  ifosfamide IVPB w/additives 96540 milliGRAM(s) IV Intermittent once  sodium chloride 0.9%. 1000 milliLiter(s) (75 mL/Hr) IV Continuous <Continuous>    MEDICATIONS  (PRN):  LORazepam     Tablet 0.25 milliGRAM(s) Oral every 6 hours PRN Nausea and/or Vomiting  metoclopramide Injectable 10 milliGRAM(s) IV Push every 6 hours PRN nausea/vomiting      FAMILY HISTORY:  Family history of lymphoma (Sibling): Patient does not know any details, he knows that his brother had the lymphoma surgically resected. Both parents are still living      SOCIAL HISTORY: No EtOH, no tobacco    REVIEW OF SYSTEMS:      All other review of systems is negative unless indicated above.    Height (cm): 177.8 (12-15 @ 12:08)  Weight (kg): 94.8 (12-15 @ 12:08)  BMI (kg/m2): 30 (12-15 @ 12:08)  BSA (m2): 2.13 (12-15 @ 12:08)    T(F): 98.4 (12-15-17 @ 16:03), Max: 98.4 (12-15-17 @ 16:03)  HR: 64 (12-15-17 @ 16:03)  BP: 139/76 (12-15-17 @ 16:03)  RR: 18 (12-15-17 @ 16:03)  SpO2: 97% (12-15-17 @ 16:03)  Wt(kg): --    GENERAL: NAD, well-developed  HEAD:  Atraumatic, Normocephalic  EYES: EOMI, PERRLA, conjunctiva and sclera clear  NECK: Supple, No JVD  CHEST/LUNG: Clear to auscultation bilaterally; No wheeze  HEART: Regular rate and rhythm; No murmurs, rubs, or gallops  ABDOMEN: Soft, Nontender, Nondistended; Bowel sounds present  EXTREMITIES:  2+ Peripheral Pulses, No clubbing, cyanosis, or edema  NEUROLOGY: non-focal  SKIN: No rashes or lesions                          12.3   21.4  )-----------( 198      ( 15 Dec 2017 12:45 )             37.2       12-15    138  |  103  |  14  ----------------------------<  138<H>  3.7   |  26  |  0.67    Ca    8.3<L>      15 Dec 2017 12:46  Phos  2.8     12-15  Mg     2.1     12-15    TPro  5.8<L>  /  Alb  3.0<L>  /  TBili  0.1<L>  /  DBili  x   /  AST  31  /  ALT  75<H>  /  AlkPhos  114  12-15      Magnesium, Serum: 2.1 mg/dL (12-15 @ 12:46)  Lactate Dehydrogenase, Serum: 197 U/L (12-15 @ 12:46)  Uric Acid, Serum: 4.2 mg/dL (12-15 @ 12:46)  Phosphorus Level, Serum: 2.8 mg/dL (12-15 @ 12:46)      PT/INR - ( 15 Dec 2017 12:46 )   PT: 12.2 sec;   INR: 1.13 ratio         PTT - ( 15 Dec 2017 12:46 )  PTT:113.1 sec

## 2017-12-15 NOTE — PROGRESS NOTE ADULT - ASSESSMENT
37yoM diagnosed in Jan 2017 with Stage IV Hodgkin's Lymphoma  treated with 6 cycles of AVD (doxorubicin, vinblastine, dacarbazine) starting in Feb 2017, with posttreatment PET revealing persistent disease, and with preeti-aortic lymph node biopsy, revealing persistent classical Hodgkin's Lymphoma, who was started on Cycle 1 of ICE on 11/25. He received his first dose of Etoposide for Cycle 2 of ICE on 12/14/17. Here for Cycle 2 ICE

## 2017-12-15 NOTE — H&P ADULT - ASSESSMENT
37yoM diagnosed in Jan 2017 with Stage IV Hodgkin's Lymphoma of intrapelvic lymph nodes with invasion into the kidney, treated with 6 cycles of AVD (doxorubicin, vinblastine, dacarbazine) starting in Feb 2017, with posttreatment PET revealing persistent disease, and with preeti-aortic lymph node biopsy, revealing persistent classical Hodgkin's Lymphoma, who was completed Cycle 1 of ICE on 11/27, s/p 1st dose of Etoposide for Cycle 2, 1day prior to admission, here as a direct admission for completion of this cycle of chemotherapy.

## 2017-12-15 NOTE — H&P ADULT - NEGATIVE OPHTHALMOLOGIC SYMPTOMS
no blurred vision L/no diplopia/no discharge R/no discharge L/no pain L/no loss of vision R/no blurred vision R/no pain R/no loss of vision L

## 2017-12-15 NOTE — PROGRESS NOTE ADULT - ATTENDING COMMENTS
Patient currently cycle 2, day 2 of ICE- tolerating it well except for nausea.  Received aloxi outpatient, reglan as needed, dose of ativan today.  Can discontinue allopurinol, d/c planning for tomorrow, neulasta on Monday.

## 2017-12-15 NOTE — H&P ADULT - PROBLEM SELECTOR PLAN 1
- s/p Cycle 1 of ICE as therapy for relapsed/recurrent disease last month, which patient tolerated well  - s/p first dose of Etoposide as outpatient yesterday, on 12/14, which patient has tolerated well  - he currently has no endorsed symptoms or complaints, but will continue to closely monitor as he receives the rest of his doses during Cycle 2  - heme-onc on board, will appreciate their recommendations    DVT PPx w/ Lovenox SC  Regular diet

## 2017-12-15 NOTE — H&P ADULT - HISTORY OF PRESENT ILLNESS
37yoM diagnosed in Jan 2017 with Stage IV Hodgkin's Lymphoma of intrapelvic lymph nodes with invasion into the kidney, treated with 6 cycles of AVD (doxorubicin, vinblastine, dacarbazine) starting in Feb 2017, with posttreatment PET revealing persistent disease, and with preeti-aortic lymph node biopsy, revealing persistent classical Hodgkin's Lymphoma, who was started on Cycle 1 of ICE on 11/25, with complete resolution of abdominal distension and pain by cycle's completion.     Since discharge on 11/27, patient has 37yoM diagnosed in Jan 2017 with Stage IV Hodgkin's Lymphoma of intrapelvic lymph nodes with invasion into the kidney, treated with 6 cycles of AVD (doxorubicin, vinblastine, dacarbazine) starting in Feb 2017, with posttreatment PET revealing persistent disease, and with preeti-aortic lymph node biopsy, revealing persistent classical Hodgkin's Lymphoma, who was started on Cycle 1 of ICE on 11/25, with complete resolution of abdominal distension and pain by cycle's completion. Immediately post-discharge, patient experienced a few days of nausea, well controlled w/ prescribed Metoclopramide and has been complaint and symptom free since. 1 day prior to admission, patient was seen at [facility], where he received his first dose of Etoposide for Cycle 2 of ICE. Patient continues to deny any complaints, including denial of pain, n/v/d/c, abd distention, SOB, or urinary complaints.     Presenting Vitals: 98.2F, 73bpm, 109/72, 16br/m, 97% on RA 37yoM diagnosed in Jan 2017 with Stage IV Hodgkin's Lymphoma of intrapelvic lymph nodes with invasion into the kidney, treated with 6 cycles of AVD (doxorubicin, vinblastine, dacarbazine) starting in Feb 2017, with posttreatment PET revealing persistent disease, and with preeti-aortic lymph node biopsy, revealing persistent classical Hodgkin's Lymphoma, who was started on Cycle 1 of ICE on 11/25, with complete resolution of abdominal distension and pain by cycle's completion. Immediately post-discharge, patient experienced a few days of nausea, well controlled w/ prescribed Metoclopramide and has been complaint and symptom free since. 1 day prior to admission, patient was seen at [facility], where he received his first dose of Etoposide for Cycle 2 of ICE. Patient continues to deny any complaints, including denial of pain, n/v/d/c, abd distention, SOB, urinary complaints, or any signs of bleeding.     Presenting Vitals: 98.2F, 73bpm, 109/72, 16br/m, 97% on RA

## 2017-12-16 ENCOUNTER — TRANSCRIPTION ENCOUNTER (OUTPATIENT)
Age: 37
End: 2017-12-16

## 2017-12-16 VITALS
HEART RATE: 75 BPM | SYSTOLIC BLOOD PRESSURE: 116 MMHG | TEMPERATURE: 98 F | OXYGEN SATURATION: 97 % | RESPIRATION RATE: 18 BRPM | DIASTOLIC BLOOD PRESSURE: 75 MMHG

## 2017-12-16 LAB
ALBUMIN SERPL ELPH-MCNC: 2.6 G/DL — LOW (ref 3.3–5)
ALBUMIN SERPL ELPH-MCNC: 2.9 G/DL — LOW (ref 3.3–5)
ALP SERPL-CCNC: 108 U/L — SIGNIFICANT CHANGE UP (ref 40–120)
ALP SERPL-CCNC: 98 U/L — SIGNIFICANT CHANGE UP (ref 40–120)
ALT FLD-CCNC: 65 U/L RC — HIGH (ref 10–45)
ALT FLD-CCNC: 77 U/L RC — HIGH (ref 10–45)
ANION GAP SERPL CALC-SCNC: 11 MMOL/L — SIGNIFICANT CHANGE UP (ref 5–17)
ANION GAP SERPL CALC-SCNC: 11 MMOL/L — SIGNIFICANT CHANGE UP (ref 5–17)
AST SERPL-CCNC: 22 U/L — SIGNIFICANT CHANGE UP (ref 10–40)
AST SERPL-CCNC: 30 U/L — SIGNIFICANT CHANGE UP (ref 10–40)
BASOPHILS # BLD AUTO: 0 K/UL — SIGNIFICANT CHANGE UP (ref 0–0.2)
BASOPHILS NFR BLD AUTO: 0 % — SIGNIFICANT CHANGE UP (ref 0–2)
BILIRUB SERPL-MCNC: 0.2 MG/DL — SIGNIFICANT CHANGE UP (ref 0.2–1.2)
BILIRUB SERPL-MCNC: 0.2 MG/DL — SIGNIFICANT CHANGE UP (ref 0.2–1.2)
BUN SERPL-MCNC: 11 MG/DL — SIGNIFICANT CHANGE UP (ref 7–23)
BUN SERPL-MCNC: 13 MG/DL — SIGNIFICANT CHANGE UP (ref 7–23)
CALCIUM SERPL-MCNC: 7.9 MG/DL — LOW (ref 8.4–10.5)
CALCIUM SERPL-MCNC: 8.1 MG/DL — LOW (ref 8.4–10.5)
CHLORIDE SERPL-SCNC: 104 MMOL/L — SIGNIFICANT CHANGE UP (ref 96–108)
CHLORIDE SERPL-SCNC: 105 MMOL/L — SIGNIFICANT CHANGE UP (ref 96–108)
CO2 SERPL-SCNC: 24 MMOL/L — SIGNIFICANT CHANGE UP (ref 22–31)
CO2 SERPL-SCNC: 24 MMOL/L — SIGNIFICANT CHANGE UP (ref 22–31)
CREAT SERPL-MCNC: 0.66 MG/DL — SIGNIFICANT CHANGE UP (ref 0.5–1.3)
CREAT SERPL-MCNC: 0.67 MG/DL — SIGNIFICANT CHANGE UP (ref 0.5–1.3)
EOSINOPHIL # BLD AUTO: 0 K/UL — SIGNIFICANT CHANGE UP (ref 0–0.5)
EOSINOPHIL NFR BLD AUTO: 0.1 % — SIGNIFICANT CHANGE UP (ref 0–6)
GLUCOSE SERPL-MCNC: 110 MG/DL — HIGH (ref 70–99)
GLUCOSE SERPL-MCNC: 128 MG/DL — HIGH (ref 70–99)
HCT VFR BLD CALC: 35.4 % — LOW (ref 39–50)
HGB BLD-MCNC: 11.8 G/DL — LOW (ref 13–17)
LDH SERPL L TO P-CCNC: 149 U/L — SIGNIFICANT CHANGE UP (ref 50–242)
LDH SERPL L TO P-CCNC: 184 U/L — SIGNIFICANT CHANGE UP (ref 50–242)
LYMPHOCYTES # BLD AUTO: 0.7 K/UL — LOW (ref 1–3.3)
LYMPHOCYTES # BLD AUTO: 3.9 % — LOW (ref 13–44)
MAGNESIUM SERPL-MCNC: 1.8 MG/DL — SIGNIFICANT CHANGE UP (ref 1.6–2.6)
MCHC RBC-ENTMCNC: 27.4 PG — SIGNIFICANT CHANGE UP (ref 27–34)
MCHC RBC-ENTMCNC: 33.4 GM/DL — SIGNIFICANT CHANGE UP (ref 32–36)
MCV RBC AUTO: 82 FL — SIGNIFICANT CHANGE UP (ref 80–100)
MONOCYTES # BLD AUTO: 1.1 K/UL — HIGH (ref 0–0.9)
MONOCYTES NFR BLD AUTO: 6.1 % — SIGNIFICANT CHANGE UP (ref 2–14)
NEUTROPHILS # BLD AUTO: 16.4 K/UL — HIGH (ref 1.8–7.4)
NEUTROPHILS NFR BLD AUTO: 90 % — HIGH (ref 43–77)
PHOSPHATE SERPL-MCNC: 2.7 MG/DL — SIGNIFICANT CHANGE UP (ref 2.5–4.5)
PHOSPHATE SERPL-MCNC: 3.7 MG/DL — SIGNIFICANT CHANGE UP (ref 2.5–4.5)
PLATELET # BLD AUTO: 215 K/UL — SIGNIFICANT CHANGE UP (ref 150–400)
POTASSIUM SERPL-MCNC: 3.7 MMOL/L — SIGNIFICANT CHANGE UP (ref 3.5–5.3)
POTASSIUM SERPL-MCNC: 4 MMOL/L — SIGNIFICANT CHANGE UP (ref 3.5–5.3)
POTASSIUM SERPL-SCNC: 3.7 MMOL/L — SIGNIFICANT CHANGE UP (ref 3.5–5.3)
POTASSIUM SERPL-SCNC: 4 MMOL/L — SIGNIFICANT CHANGE UP (ref 3.5–5.3)
PROT SERPL-MCNC: 5.3 G/DL — LOW (ref 6–8.3)
PROT SERPL-MCNC: 5.7 G/DL — LOW (ref 6–8.3)
RBC # BLD: 4.31 M/UL — SIGNIFICANT CHANGE UP (ref 4.2–5.8)
RBC # FLD: 15.8 % — HIGH (ref 10.3–14.5)
SODIUM SERPL-SCNC: 139 MMOL/L — SIGNIFICANT CHANGE UP (ref 135–145)
SODIUM SERPL-SCNC: 140 MMOL/L — SIGNIFICANT CHANGE UP (ref 135–145)
URATE SERPL-MCNC: 2.9 MG/DL — LOW (ref 3.4–8.8)
URATE SERPL-MCNC: 3.2 MG/DL — LOW (ref 3.4–8.8)
WBC # BLD: 18.3 K/UL — HIGH (ref 3.8–10.5)
WBC # FLD AUTO: 18.3 K/UL — HIGH (ref 3.8–10.5)

## 2017-12-16 PROCEDURE — 85027 COMPLETE CBC AUTOMATED: CPT

## 2017-12-16 PROCEDURE — 99239 HOSP IP/OBS DSCHRG MGMT >30: CPT

## 2017-12-16 PROCEDURE — 86900 BLOOD TYPING SEROLOGIC ABO: CPT

## 2017-12-16 PROCEDURE — 99233 SBSQ HOSP IP/OBS HIGH 50: CPT | Mod: GC

## 2017-12-16 PROCEDURE — 83735 ASSAY OF MAGNESIUM: CPT

## 2017-12-16 PROCEDURE — 80053 COMPREHEN METABOLIC PANEL: CPT

## 2017-12-16 PROCEDURE — 84100 ASSAY OF PHOSPHORUS: CPT

## 2017-12-16 PROCEDURE — 85610 PROTHROMBIN TIME: CPT

## 2017-12-16 PROCEDURE — 85730 THROMBOPLASTIN TIME PARTIAL: CPT

## 2017-12-16 PROCEDURE — 86850 RBC ANTIBODY SCREEN: CPT

## 2017-12-16 PROCEDURE — 84550 ASSAY OF BLOOD/URIC ACID: CPT

## 2017-12-16 PROCEDURE — 83615 LACTATE (LD) (LDH) ENZYME: CPT

## 2017-12-16 PROCEDURE — 86901 BLOOD TYPING SEROLOGIC RH(D): CPT

## 2017-12-16 PROCEDURE — 85384 FIBRINOGEN ACTIVITY: CPT

## 2017-12-16 RX ORDER — METOCLOPRAMIDE HCL 10 MG
1 TABLET ORAL
Qty: 0 | Refills: 0 | COMMUNITY
Start: 2017-12-16

## 2017-12-16 RX ORDER — ALLOPURINOL 300 MG
1 TABLET ORAL
Qty: 7 | Refills: 0 | OUTPATIENT
Start: 2017-12-16 | End: 2017-12-22

## 2017-12-16 RX ORDER — ETOPOSIDE 20 MG/ML
213 VIAL (ML) INTRAVENOUS ONCE
Qty: 0 | Refills: 0 | Status: DISCONTINUED | OUTPATIENT
Start: 2017-12-16 | End: 2017-12-16

## 2017-12-16 RX ADMIN — Medication 10 MILLIGRAM(S): at 14:05

## 2017-12-16 RX ADMIN — Medication 10 MILLIGRAM(S): at 06:59

## 2017-12-16 RX ADMIN — Medication 300 MILLIGRAM(S): at 11:39

## 2017-12-16 RX ADMIN — SODIUM CHLORIDE 75 MILLILITER(S): 9 INJECTION INTRAMUSCULAR; INTRAVENOUS; SUBCUTANEOUS at 18:12

## 2017-12-16 NOTE — DISCHARGE NOTE ADULT - PLAN OF CARE
Continue ongoing treatment for persistent disease. - Please be sure to follow up with your oncologist at the [facility] on Monday, 12/18/17 to receive your Neulasta injection, and to discuss further monitoring  - Please be sure to take your medication: Allopurinol as prescribed, daily-- and please discuss with your oncologist about obtaining a new prescription if she would like you to be on the medication for a longer period of time  - Please be sure to take your doses of Metoclopramide (Reglan) that you said you still have a supply of at home, if and when you feel nauseous or have an episode of vomiting  - Please seek medical care (contact your PMD or oncologist, or return to the emergency room) if you notice any signs or have any symptoms of fever (any temperature greater than or equal to 100.4 degrees), uncontrolled nausea, multiple episodes of vomiting, diarrhea, or uncontrolled bleeding

## 2017-12-16 NOTE — DISCHARGE NOTE ADULT - PATIENT PORTAL LINK FT
“You can access the [facility] Patient Portal, offered by [facility], by registering with the following website: [email or web address]”

## 2017-12-16 NOTE — DISCHARGE NOTE ADULT - MEDICATION SUMMARY - MEDICATIONS TO TAKE
I will START or STAY ON the medications listed below when I get home from the hospital:    metoclopramide 10 mg oral tablet  -- 1 tab(s) by mouth 4 times a day As Needed for Nausea/Vomiting  -- Indication: For Nausea / Vomiting    allopurinol 300 mg oral tablet  -- 1 tab(s) by mouth once a day  -- Indication: For Hodgkin lymphoma of intrapelvic lymph nodes, unspecified Hodgkin lymphoma type

## 2017-12-16 NOTE — DISCHARGE NOTE ADULT - HOSPITAL COURSE
37yoM diagnosed in Jan 2017 with Stage IV Hodgkin's Lymphoma of intrapelvic lymph nodes with invasion into the kidney, treated with 6 cycles of AVD (doxorubicin, vinblastine, dacarbazine) starting in Feb 2017, with posttreatment PET revealing persistent disease, and with preeti-aortic lymph node biopsy, revealing persistent classical Hodgkin's Lymphoma, who was started on Cycle 1 of ICE on 11/25, with complete resolution of abdominal distension and pain by cycle's completion. Immediately post-discharge, patient experienced a few days of nausea, well controlled w/ prescribed Metoclopramide and has been complaint and symptom free since. 1 day prior to admission, patient was seen at [facility], where he received his first dose of Etoposide for Cycle 2 of ICE. Patient continues to deny any complaints, including denial of pain, n/v/d/c, abd distention, SOB, urinary complaints, or any signs of bleeding.     Presenting Vitals: 98.2F, 73bpm, 109/72, 16br/m, 97% on RA    Hodgkin lymphoma of intrapelvic lymph nodes, unspecified Hodgkin lymphoma type.    - s/p Days 1 and 2 and 3 of Cycle 2 of ICE, which patient continues to tolerate well w/ symptoms of nausea controlled w/ Reglan  - daily monitoring of CBC and TLS labs; continue daily Allopurinol 300mg on discharge  - heme-onc on board, their recommendations have been appreciated; plan for [facility] f/u on Monday for Neulasta injection

## 2017-12-16 NOTE — PROGRESS NOTE ADULT - PROBLEM SELECTOR PLAN 1
to get cycle 2 ICE here. D3 today, got etoposide yesterday 12/14/17.  IVF  allopurinol 300mg oral daily  TLS labs daily monitor ( LDH, uric acid, cmp, phos, mag)  check cbc with diff daily   treated with 6 cycles of AVD (doxorubicin, vinblastine, dacarbazine, did not get Bleo because of restrictive lung dx) starting in Feb 2017, with posttreatment PET revealing persistent disease, and with preeti-aortic lymph node biopsy, revealing persistent classical Hodgkin's Lymphoma, who was started on Cycle 1 of ICE on 11/25.

## 2017-12-16 NOTE — DISCHARGE NOTE ADULT - CARE PLAN
Principal Discharge DX:	Hodgkin lymphoma of intrapelvic lymph nodes, unspecified Hodgkin lymphoma type  Goal:	Continue ongoing treatment for persistent disease.  Instructions for follow-up, activity and diet:	- Please be sure to follow up with your oncologist at the [facility] on Monday, 12/18/17 to receive your Neulasta injection, and to discuss further monitoring  - Please be sure to take your medication: Allopurinol as prescribed, daily-- and please discuss with your oncologist about obtaining a new prescription if she would like you to be on the medication for a longer period of time  - Please be sure to take your doses of Metoclopramide (Reglan) that you said you still have a supply of at home, if and when you feel nauseous or have an episode of vomiting  - Please seek medical care (contact your PMD or oncologist, or return to the emergency room) if you notice any signs or have any symptoms of fever (any temperature greater than or equal to 100.4 degrees), uncontrolled nausea, multiple episodes of vomiting, diarrhea, or uncontrolled bleeding

## 2017-12-16 NOTE — PROGRESS NOTE ADULT - PROBLEM SELECTOR PLAN 1
- s/p Days 1 and 2 of Cycle 3 of ICE, which patient continues to tolerate well w/ symptoms of nausea controlled w/ Reglan  - he currently has no endorsed symptoms or complaints, except for aforementioned nausea, but will continue to closely monitor as he receives the rest of his doses during Cycle 2  - monitoring daily CBC and TLS labs  - heme-onc on board, their recommendations have been appreciated; plan for [facility] f/u on Monday for Neulasta injection    DVT PPx w/ Lovenox SC  Regular diet

## 2017-12-16 NOTE — ADVANCED PRACTICE NURSE CONSULT - ASSESSMENT
Pt. seen in bed a/o x4,with slight nausea noted .Pt. claimed he received reglan 10 mg IV.Pt. and family verbalized understanding of chemotherapy infusion,including its indications and possible side effects.Pt. has right chest mediport intact and patent.Dsg dry and intact.Site with no s/s of bleeding ,redness or swelling.With positive blood return noted and flushing with 10 ml NS.Lab values seen on rounds by MD.Drug verification done by 2 RN.s'. Pt. seen in bed a/o x4,with slight nausea noted .Pt. claimed he received reglan 10 mg IV.Pt. and family verbalized understanding of chemotherapy infusion,including its indications and possible side effects.Pt. has right chest mediport intact and patent.Dsg dry and intact.Site with no s/s of bleeding ,redness or swelling.With positive blood return noted and flushing with 10 ml NS.Lab values seen on rounds by MD.Drug verification done by 2 RN.s'.At 1530  Day 3/3  Etoposide 100mg/b0=917 mg IV started and infusing over 2 hours to mediport vi alaris pump.Pt. tolerating infusion.15 minutes V/s done and recorded .Primary RN aware of present treatment.Left pt. comfortable in bed.

## 2017-12-16 NOTE — DISCHARGE NOTE ADULT - CARE PROVIDER_API CALL
Hyacinth Mccallum), Hematology; Internal Medicine; Medical Oncology  [address] [place] [address]  Phone: [phone]  Fax: [phone]

## 2017-12-16 NOTE — PROGRESS NOTE ADULT - SUBJECTIVE AND OBJECTIVE BOX
Patient is a 37y old  Male who presents with a chief complaint of direct admission for completion of second cycle of chemotherapy (15 Dec 2017 12:03)    SUBJECTIVE / OVERNIGHT EVENTS: Patient seen and examined. No acute overnight events-- patient tolerated chemotx regimen, though complaining of nausea this AM, aware of PRN Reglan ordered. Patient and his wife eager to be discharged home immediately after day 3 of regimen completed.     MEDICATIONS  (STANDING):  allopurinol 300 milliGRAM(s) Oral daily  CARBOplatin IVPB 750 milliGRAM(s) IV Intermittent once  enoxaparin Injectable 40 milliGRAM(s) SubCutaneous every 24 hours  etoposide IVPB 213 milliGRAM(s) IV Intermittent once  etoposide IVPB 213 milliGRAM(s) IV Intermittent once  ifosfamide IVPB w/additives 03108 milliGRAM(s) IV Intermittent once  sodium chloride 0.9%. 1000 milliLiter(s) (75 mL/Hr) IV Continuous <Continuous>    MEDICATIONS  (PRN):  LORazepam Tablet 0.25 milliGRAM(s) Oral every 6 hours PRN Nausea and/or Vomiting  metoclopramide Injectable 10 milliGRAM(s) IV Push every 6 hours PRN nausea/vomiting    Vital Signs Last 24 Hrs  T(F): 98.3 (16 Dec 2017 07:41), Max: 98.4 (15 Dec 2017 16:03)  HR: 82 (16 Dec 2017 07:41) (64 - 82)  BP: 104/67 (16 Dec 2017 07:41) (100/63 - 139/76)  RR: 18 (16 Dec 2017 07:41) (18 - 18)  SpO2: 97% (16 Dec 2017 07:41) (95% - 97%)    I&O's Summary    15 Dec 2017 07:01  -  16 Dec 2017 07:00  --------------------------------------------------------  IN: 3825 mL / OUT: 2725 mL / NET: 1100 mL    16 Dec 2017 07:01  -  16 Dec 2017 14:03  --------------------------------------------------------  IN: 700 mL / OUT: 0 mL / NET: 700 mL    PHYSICAL EXAM:  GEN: young male, laying in bed, in NAD  SKIN: intact, no e/o rash; right subclavian vascular access port  EYES: PERRL, anicteric  HEAD: NC/AT  NECK: supple, no e/o elevated JVP appreciated  RESPI: no accessory muscle use, patient able to speak in full sentences, B/L air entry, CTAB  CARDIO: no murmurs appreciated on auscultation, no LE edema B/L  ABD: soft, NT to deep palpation in all quadrants, ND, +BS  NEURO: A&Ox3  EXT: pt able to move all extremities spontaneously  VASC: peripheral pulses palpated B/L    LABS:                     11.8   18.3  )-----------( 215      ( 16 Dec 2017 12:25 )             35.4     12-16    139  |  104  |  13  ----------------------------<  128<H>  3.7   |  24  |  0.66    Ca    7.9<L>      16 Dec 2017 12:25  Phos  2.7     12-16  Mg     1.8     12-16    TPro  5.3<L>  /  Alb  2.6<L>  /  TBili  0.2  /  DBili  x   /  AST  22  /  ALT  65<H>  /  AlkPhos  98  12-16    PT/INR - ( 15 Dec 2017 12:46 )   PT: 12.2 sec;   INR: 1.13 ratio   PTT - ( 15 Dec 2017 12:46 )  PTT:113.1 sec

## 2017-12-16 NOTE — PROGRESS NOTE ADULT - SUBJECTIVE AND OBJECTIVE BOX
Hematology Follow-up    INTERVAL HPI/OVERNIGHT EVENTS:  Patient S&E at bedside. No o/n events, patient resting comfortably. No complaints at this time. Patient denies fever, chills, dizziness, weakness, CP, palpitations, SOB, cough, N/V/D/C, dysuria, changes in bowel movements, LE edema.    VITAL SIGNS:  T(F): 98.3 (12-16-17 @ 07:41)  HR: 82 (12-16-17 @ 07:41)  BP: 104/67 (12-16-17 @ 07:41)  RR: 18 (12-16-17 @ 07:41)  SpO2: 97% (12-16-17 @ 07:41)  Wt(kg): --    PHYSICAL EXAM:    Constitutional: AAOx3, NAD,   Eyes: PERRL, EOMI, sclera non-icteric  Neck: supple, no masses, no JVD  Respiratory: CTA b/l, good air entry b/l, no wheezing, rhonchi, rales, with normal respiratory effort and no intercostal retractions  Cardiovascular: RRR, normal S1S2, no M/R/G  Gastrointestinal: soft, NTND, no masses palpable, BS normal in all four quadrants, no HSM  Extremities:  no c/c/e  Neurological: Grossly intact  Skin: Normal temperature    MEDICATIONS  (STANDING):  allopurinol 300 milliGRAM(s) Oral daily  CARBOplatin IVPB 750 milliGRAM(s) IV Intermittent once  enoxaparin Injectable 40 milliGRAM(s) SubCutaneous every 24 hours  etoposide IVPB 213 milliGRAM(s) IV Intermittent once  ifosfamide IVPB w/additives 74462 milliGRAM(s) IV Intermittent once  sodium chloride 0.9%. 1000 milliLiter(s) (75 mL/Hr) IV Continuous <Continuous>    MEDICATIONS  (PRN):  LORazepam     Tablet 0.25 milliGRAM(s) Oral every 6 hours PRN Nausea and/or Vomiting  metoclopramide Injectable 10 milliGRAM(s) IV Push every 6 hours PRN nausea/vomiting      No Known Allergies      LABS:                        12.3   21.4  )-----------( 198      ( 15 Dec 2017 12:45 )             37.2     12-16    140  |  105  |  11  ----------------------------<  110<H>  4.0   |  24  |  0.67    Ca    8.1<L>      16 Dec 2017 06:49  Phos  3.7     12-16  Mg     2.1     12-15    TPro  5.7<L>  /  Alb  2.9<L>  /  TBili  0.2  /  DBili  x   /  AST  30  /  ALT  77<H>  /  AlkPhos  108  12-16    PT/INR - ( 15 Dec 2017 12:46 )   PT: 12.2 sec;   INR: 1.13 ratio         PTT - ( 15 Dec 2017 12:46 )  PTT:113.1 sec Lactate Dehydrogenase, Serum: 149 U/L (12-16 @ 06:49)  Lactate Dehydrogenase, Serum: 197 U/L (12-15 @ 12:46)  Fibrinogen Assay: 627 mg/dL (12-15 @ 12:46)        RADIOLOGY & ADDITIONAL TESTS:  Studies reviewed.

## 2017-12-17 NOTE — PROGRESS NOTE ADULT - PROBLEM SELECTOR PLAN 1
to get cycle 2 ICE here. D4 today, got etoposide yesterday 12/14/17.  IVF  allopurinol 300mg oral daily  TLS labs daily monitor ( LDH, uric acid, cmp, phos, mag)  check cbc with diff daily   treated with 6 cycles of AVD (doxorubicin, vinblastine, dacarbazine, did not get Bleo because of restrictive lung dx) starting in Feb 2017, with posttreatment PET revealing persistent disease, and with preeti-aortic lymph node biopsy, revealing persistent classical Hodgkin's Lymphoma, who was started on Cycle 1 of ICE on 11/25.

## 2017-12-17 NOTE — PROGRESS NOTE ADULT - SUBJECTIVE AND OBJECTIVE BOX
Hematology Follow-up    INTERVAL HPI/OVERNIGHT EVENTS:  Patient S&E at bedside. No o/n events, patient resting comfortably. No complaints at this time. Patient denies fever, chills, dizziness, weakness, CP, palpitations, SOB, cough, N/V/D/C, dysuria, changes in bowel movements, LE edema.    VITAL SIGNS:  T(F): 98.2 (12-16-17 @ 19:35)  HR: 75 (12-16-17 @ 19:35)  BP: 116/75 (12-16-17 @ 19:35)  RR: 18 (12-16-17 @ 19:35)  SpO2: 97% (12-16-17 @ 19:35)  Wt(kg): --    PHYSICAL EXAM:    Constitutional: AAOx3, NAD,   Eyes: PERRL, EOMI, sclera non-icteric  Neck: supple, no masses, no JVD  Respiratory: CTA b/l, good air entry b/l, no wheezing, rhonchi, rales, with normal respiratory effort and no intercostal retractions  Cardiovascular: RRR, normal S1S2, no M/R/G  Gastrointestinal: soft, NTND, no masses palpable, BS normal in all four quadrants, no HSM  Extremities:  no c/c/e  Neurological: Grossly intact  Skin: Normal temperature    MEDICATIONS  (STANDING):    MEDICATIONS  (PRN):      No Known Allergies      LABS:                        11.8   18.3  )-----------( 215      ( 16 Dec 2017 12:25 )             35.4     12-16    139  |  104  |  13  ----------------------------<  128<H>  3.7   |  24  |  0.66    Ca    7.9<L>      16 Dec 2017 12:25  Phos  2.7     12-16  Mg     1.8     12-16    TPro  5.3<L>  /  Alb  2.6<L>  /  TBili  0.2  /  DBili  x   /  AST  22  /  ALT  65<H>  /  AlkPhos  98  12-16    PT/INR - ( 15 Dec 2017 12:46 )   PT: 12.2 sec;   INR: 1.13 ratio         PTT - ( 15 Dec 2017 12:46 )  PTT:113.1 sec Lactate Dehydrogenase, Serum: 184 U/L (12-16 @ 12:25)        RADIOLOGY & ADDITIONAL TESTS:  Studies reviewed.

## 2017-12-18 ENCOUNTER — APPOINTMENT (OUTPATIENT)
Dept: INFUSION THERAPY | Facility: HOSPITAL | Age: 37
End: 2017-12-18

## 2017-12-19 DIAGNOSIS — Z51.89 ENCOUNTER FOR OTHER SPECIFIED AFTERCARE: ICD-10-CM

## 2018-01-02 ENCOUNTER — RESULT REVIEW (OUTPATIENT)
Age: 38
End: 2018-01-02

## 2018-01-02 ENCOUNTER — APPOINTMENT (OUTPATIENT)
Dept: HEMATOLOGY ONCOLOGY | Facility: CLINIC | Age: 38
End: 2018-01-02
Payer: COMMERCIAL

## 2018-01-02 VITALS
BODY MASS INDEX: 30.99 KG/M2 | WEIGHT: 216.49 LBS | RESPIRATION RATE: 16 BRPM | TEMPERATURE: 97.6 F | SYSTOLIC BLOOD PRESSURE: 124 MMHG | HEART RATE: 74 BPM | OXYGEN SATURATION: 98 % | DIASTOLIC BLOOD PRESSURE: 86 MMHG

## 2018-01-02 LAB
BASOPHILS # BLD AUTO: 0.1 K/UL — SIGNIFICANT CHANGE UP (ref 0–0.2)
BASOPHILS NFR BLD AUTO: 0.4 % — SIGNIFICANT CHANGE UP (ref 0–2)
EOSINOPHIL # BLD AUTO: 0.2 K/UL — SIGNIFICANT CHANGE UP (ref 0–0.5)
EOSINOPHIL NFR BLD AUTO: 1.2 % — SIGNIFICANT CHANGE UP (ref 0–6)
HCT VFR BLD CALC: 37.2 % — LOW (ref 39–50)
HGB BLD-MCNC: 12.2 G/DL — LOW (ref 13–17)
LYMPHOCYTES # BLD AUTO: 1 K/UL — SIGNIFICANT CHANGE UP (ref 1–3.3)
LYMPHOCYTES # BLD AUTO: 8 % — LOW (ref 13–44)
MCHC RBC-ENTMCNC: 26.9 PG — LOW (ref 27–34)
MCHC RBC-ENTMCNC: 32.8 G/DL — SIGNIFICANT CHANGE UP (ref 32–36)
MCV RBC AUTO: 81.8 FL — SIGNIFICANT CHANGE UP (ref 80–100)
MONOCYTES # BLD AUTO: 0.6 K/UL — SIGNIFICANT CHANGE UP (ref 0–0.9)
MONOCYTES NFR BLD AUTO: 4.6 % — SIGNIFICANT CHANGE UP (ref 2–14)
NEUTROPHILS # BLD AUTO: 10.9 K/UL — HIGH (ref 1.8–7.4)
NEUTROPHILS NFR BLD AUTO: 85.7 % — HIGH (ref 43–77)
PLATELET # BLD AUTO: 209 K/UL — SIGNIFICANT CHANGE UP (ref 150–400)
RBC # BLD: 4.55 M/UL — SIGNIFICANT CHANGE UP (ref 4.2–5.8)
RBC # FLD: 20 % — HIGH (ref 10.3–14.5)
WBC # BLD: 12.7 K/UL — HIGH (ref 3.8–10.5)
WBC # FLD AUTO: 12.7 K/UL — HIGH (ref 3.8–10.5)

## 2018-01-02 PROCEDURE — 99214 OFFICE O/P EST MOD 30 MIN: CPT

## 2018-01-04 ENCOUNTER — FORM ENCOUNTER (OUTPATIENT)
Age: 38
End: 2018-01-04

## 2018-01-05 ENCOUNTER — APPOINTMENT (OUTPATIENT)
Dept: NUCLEAR MEDICINE | Facility: IMAGING CENTER | Age: 38
End: 2018-01-05
Payer: COMMERCIAL

## 2018-01-05 ENCOUNTER — OUTPATIENT (OUTPATIENT)
Dept: OUTPATIENT SERVICES | Facility: HOSPITAL | Age: 38
LOS: 1 days | End: 2018-01-05
Payer: COMMERCIAL

## 2018-01-05 DIAGNOSIS — C81.90 HODGKIN LYMPHOMA, UNSPECIFIED, UNSPECIFIED SITE: ICD-10-CM

## 2018-01-05 PROCEDURE — 78815 PET IMAGE W/CT SKULL-THIGH: CPT

## 2018-01-05 PROCEDURE — 78815 PET IMAGE W/CT SKULL-THIGH: CPT | Mod: 26,PS

## 2018-01-05 PROCEDURE — A9552: CPT

## 2018-01-08 ENCOUNTER — APPOINTMENT (OUTPATIENT)
Dept: INFUSION THERAPY | Facility: HOSPITAL | Age: 38
End: 2018-01-08

## 2018-01-20 ENCOUNTER — INPATIENT (INPATIENT)
Facility: HOSPITAL | Age: 38
LOS: 1 days | Discharge: HOME | DRG: 847 | End: 2018-01-22
Attending: STUDENT IN AN ORGANIZED HEALTH CARE EDUCATION/TRAINING PROGRAM | Admitting: STUDENT IN AN ORGANIZED HEALTH CARE EDUCATION/TRAINING PROGRAM
Payer: COMMERCIAL

## 2018-01-20 VITALS
HEART RATE: 96 BPM | TEMPERATURE: 97 F | OXYGEN SATURATION: 97 % | RESPIRATION RATE: 18 BRPM | WEIGHT: 214.95 LBS | SYSTOLIC BLOOD PRESSURE: 107 MMHG | HEIGHT: 70 IN | DIASTOLIC BLOOD PRESSURE: 74 MMHG

## 2018-01-20 DIAGNOSIS — C81.90 HODGKIN LYMPHOMA, UNSPECIFIED, UNSPECIFIED SITE: ICD-10-CM

## 2018-01-20 LAB
ALBUMIN SERPL ELPH-MCNC: 3.1 G/DL — LOW (ref 3.3–5)
ALP SERPL-CCNC: 80 U/L — SIGNIFICANT CHANGE UP (ref 40–120)
ALT FLD-CCNC: 45 U/L RC — SIGNIFICANT CHANGE UP (ref 10–45)
ANION GAP SERPL CALC-SCNC: 10 MMOL/L — SIGNIFICANT CHANGE UP (ref 5–17)
APTT BLD: 33.7 SEC — SIGNIFICANT CHANGE UP (ref 27.5–37.4)
AST SERPL-CCNC: 26 U/L — SIGNIFICANT CHANGE UP (ref 10–40)
BILIRUB SERPL-MCNC: 0.2 MG/DL — SIGNIFICANT CHANGE UP (ref 0.2–1.2)
BLD GP AB SCN SERPL QL: NEGATIVE — SIGNIFICANT CHANGE UP
BUN SERPL-MCNC: 22 MG/DL — SIGNIFICANT CHANGE UP (ref 7–23)
CALCIUM SERPL-MCNC: 8.1 MG/DL — LOW (ref 8.4–10.5)
CHLORIDE SERPL-SCNC: 105 MMOL/L — SIGNIFICANT CHANGE UP (ref 96–108)
CO2 SERPL-SCNC: 24 MMOL/L — SIGNIFICANT CHANGE UP (ref 22–31)
CREAT SERPL-MCNC: 0.7 MG/DL — SIGNIFICANT CHANGE UP (ref 0.5–1.3)
GLUCOSE SERPL-MCNC: 98 MG/DL — SIGNIFICANT CHANGE UP (ref 70–99)
HCT VFR BLD CALC: 36.6 % — LOW (ref 39–50)
HGB BLD-MCNC: 13 G/DL — SIGNIFICANT CHANGE UP (ref 13–17)
INR BLD: 1.1 RATIO — SIGNIFICANT CHANGE UP (ref 0.88–1.16)
LDH SERPL L TO P-CCNC: 125 U/L — SIGNIFICANT CHANGE UP (ref 50–242)
MCHC RBC-ENTMCNC: 30.7 PG — SIGNIFICANT CHANGE UP (ref 27–34)
MCHC RBC-ENTMCNC: 35.6 GM/DL — SIGNIFICANT CHANGE UP (ref 32–36)
MCV RBC AUTO: 86.2 FL — SIGNIFICANT CHANGE UP (ref 80–100)
PLATELET # BLD AUTO: 232 K/UL — SIGNIFICANT CHANGE UP (ref 150–400)
POTASSIUM SERPL-MCNC: 3.8 MMOL/L — SIGNIFICANT CHANGE UP (ref 3.5–5.3)
POTASSIUM SERPL-SCNC: 3.8 MMOL/L — SIGNIFICANT CHANGE UP (ref 3.5–5.3)
PROT SERPL-MCNC: 5.4 G/DL — LOW (ref 6–8.3)
PROTHROM AB SERPL-ACNC: 12 SEC — SIGNIFICANT CHANGE UP (ref 9.8–12.7)
RBC # BLD: 4.24 M/UL — SIGNIFICANT CHANGE UP (ref 4.2–5.8)
RBC # FLD: 19.4 % — HIGH (ref 10.3–14.5)
RH IG SCN BLD-IMP: POSITIVE — SIGNIFICANT CHANGE UP
SODIUM SERPL-SCNC: 139 MMOL/L — SIGNIFICANT CHANGE UP (ref 135–145)
URATE SERPL-MCNC: 5 MG/DL — SIGNIFICANT CHANGE UP (ref 3.4–8.8)
WBC # BLD: 4.4 K/UL — SIGNIFICANT CHANGE UP (ref 3.8–10.5)
WBC # FLD AUTO: 4.4 K/UL — SIGNIFICANT CHANGE UP (ref 3.8–10.5)

## 2018-01-20 RX ORDER — SODIUM CHLORIDE 9 MG/ML
1000 INJECTION INTRAMUSCULAR; INTRAVENOUS; SUBCUTANEOUS
Qty: 0 | Refills: 0 | Status: DISCONTINUED | OUTPATIENT
Start: 2018-01-20 | End: 2018-01-22

## 2018-01-20 RX ORDER — ETOPOSIDE 20 MG/ML
215 VIAL (ML) INTRAVENOUS DAILY
Qty: 0 | Refills: 0 | Status: DISCONTINUED | OUTPATIENT
Start: 2018-01-20 | End: 2018-01-22

## 2018-01-20 RX ORDER — ONDANSETRON 8 MG/1
8 TABLET, FILM COATED ORAL EVERY 8 HOURS
Qty: 0 | Refills: 0 | Status: DISCONTINUED | OUTPATIENT
Start: 2018-01-20 | End: 2018-01-22

## 2018-01-20 RX ORDER — METOCLOPRAMIDE HCL 10 MG
10 TABLET ORAL EVERY 6 HOURS
Qty: 0 | Refills: 0 | Status: DISCONTINUED | OUTPATIENT
Start: 2018-01-20 | End: 2018-01-22

## 2018-01-20 RX ORDER — INFLUENZA VIRUS VACCINE 15; 15; 15; 15 UG/.5ML; UG/.5ML; UG/.5ML; UG/.5ML
0.5 SUSPENSION INTRAMUSCULAR ONCE
Qty: 0 | Refills: 0 | Status: COMPLETED | OUTPATIENT
Start: 2018-01-20 | End: 2018-01-20

## 2018-01-20 RX ORDER — FOSAPREPITANT DIMEGLUMINE 150 MG/5ML
150 INJECTION, POWDER, LYOPHILIZED, FOR SOLUTION INTRAVENOUS ONCE
Qty: 0 | Refills: 0 | Status: COMPLETED | OUTPATIENT
Start: 2018-01-20 | End: 2018-01-20

## 2018-01-20 RX ADMIN — FOSAPREPITANT DIMEGLUMINE 300 MILLIGRAM(S): 150 INJECTION, POWDER, LYOPHILIZED, FOR SOLUTION INTRAVENOUS at 15:46

## 2018-01-20 RX ADMIN — SODIUM CHLORIDE 100 MILLILITER(S): 9 INJECTION INTRAMUSCULAR; INTRAVENOUS; SUBCUTANEOUS at 21:48

## 2018-01-20 RX ADMIN — SODIUM CHLORIDE 100 MILLILITER(S): 9 INJECTION INTRAMUSCULAR; INTRAVENOUS; SUBCUTANEOUS at 17:15

## 2018-01-20 RX ADMIN — ONDANSETRON 8 MILLIGRAM(S): 8 TABLET, FILM COATED ORAL at 21:48

## 2018-01-20 NOTE — ADVANCED PRACTICE NURSE CONSULT - ASSESSMENT
lab results as follows: wbc 4.4 Hgb 13 hct 36.6 Plt count 232. Creatinine 0.7 Total bili 0.2 Patient with right chestwall double lumen Powerport,one port accessed by Celia OLVERA,patent and intact. Reinforced teachings about his chemo regimen both patient and spouse verbalized understanding. Zofran 8mg IV x 1 given prior to Etoposide as antiemetic followed by Emend 150mg IV x 1. Etoposide 215mg in 1000ml NSS started at 1645 x 2 hours infusion at 505ml/hr via NSS line through right chestwall powerport. patent. primary RN aware of plan of care. Safety maintained.

## 2018-01-20 NOTE — H&P ADULT - NSHPPHYSICALEXAM_GEN_ALL_CORE
GEN: young male, laying in bed, in NAD  	SKIN: intact, no e/o rash; right subclavian vascular access port without surrounding warmth/erythema/swelling/pain  	EYES: PERRL, anicteric  	HEAD: NC/AT  	NECK: supple, no e/o elevated JVP appreciated  	RESPI: no accessory muscle use, patient able to speak in full sentences, B/L air entry, CTAB  	CARDIO: no murmurs appreciated on auscultation, no LE edema B/L  	ABD: soft, NT to deep palpation in all quadrants, ND, +BS  	NEURO: A&Ox3  	EXT: pt able to move all extremities spontaneously  VASC: peripheral pulses palpated B/L

## 2018-01-20 NOTE — H&P ADULT - NSHPREVIEWOFSYSTEMS_GEN_ALL_CORE
· General	negative  · Negative Skin Symptoms	no rash; no itching; no dryness  · Negative Ophthalmologic Symptoms	no diplopia; no blurred vision L; no blurred vision R; no discharge L; no discharge R; no pain L; no pain R; no loss of vision L; no loss of vision R  · Negative ENMT Symptoms	no sinus symptoms; no nasal congestion; no throat pain; no dysphagia  · Negative Respiratory and Thorax Symptoms	no wheezing; no dyspnea; no cough; no pleuritic chest pain  · Negative Cardiovascular Symptoms	no chest pain; no palpitations; no dyspnea on exertion; no peripheral edema  · Negative Gastrointestinal Symptoms	no nausea; no vomiting; no diarrhea; no constipation; no change in bowel habits; no abdominal pain  · Negative General Genitourinary Symptoms	no hematuria; no flank pain L; no flank pain R; no dysuria  · Negative Musculoskeletal Symptoms	no myalgia; no muscle weakness; no neck pain; no back pain  · Neurological	negative  · Psychiatric	negative  · Endocrine	negative  · Negative Allergic Reactions	no respiratory distress

## 2018-01-20 NOTE — H&P ADULT - PROBLEM SELECTOR PLAN 1
Patient is here for ICE regime (Etoposide, Ifosfomide/Mesna with Carboplatin AUC 5). He will start chemo today. Monitor CBC daily   -transfuse if hgb <7 or platelets <10  -IVF  -allopurinol 300mg oral daily  -Antiemetic per chemo order.   -TLS labs daily monitor ( LDH, uric acid, cmp, phos, mag)  - heme-onc on board, will appreciate their recommendations

## 2018-01-20 NOTE — H&P ADULT - HISTORY OF PRESENT ILLNESS
37yoM with Stage IV Hodgkin's Lymphoma diagnosed in 01/2017,  patient was treated with 6 cycles of AVD (doxorubicin, vinblastine, dacarbazine) starting in Feb 2017, with posttreatment PET revealing persistent disease, and with preeti-aortic lymph node biopsy, revealing persistent classical Hodgkin's Lymphoma. He was started on Cycle 1 of ICE on 11/25, with complete resolution of abdominal distension and pain by cycle's completion. Immediately post-discharge, patient experienced a few days of nausea, well controlled w/ prescribed Metoclopramide and has been complaint and symptom free since. 1 day prior to admission, patient was seen at [facility], where he received his first dose of Etoposide for Cycle 2 of ICE. Patient continues to deny any complaints, including denial of pain, n/v/d/c, abd distention, SOB, urinary complaints, or any signs of bleeding 37yoM with Stage IV Hodgkin's Lymphoma diagnosed in 01/2017,  patient was treated with 6 cycles of AVD (doxorubicin, vinblastine, dacarbazine) starting in Feb 2017, with posttreatment PET revealing persistent disease, and with preeti-aortic lymph node biopsy, revealing persistent classical Hodgkin's Lymphoma. He was started on Cycle 1 of ICE on 11/25, with complete resolution of abdominal distension and pain by cycle's completion. Patient received cycle 2 on 12/14/207 and now he is admitted for cycle 3 of chemo prior to transplant.   Patient continues to deny any complaints, including denial of pain, n/v/d/c, abd distention, SOB, urinary complaints, or any signs of bleeding. No fever or chills. No fatigue or lack of energy with good appetite.

## 2018-01-20 NOTE — H&P ADULT - ASSESSMENT
37yoM with refractory/relapse Stage IV Hodgkin's Lymphoma now on salvage chemotherapy, here for cycle 3 of ICE.

## 2018-01-21 DIAGNOSIS — B99.9 UNSPECIFIED INFECTIOUS DISEASE: ICD-10-CM

## 2018-01-21 LAB
ALBUMIN SERPL ELPH-MCNC: 2.8 G/DL — LOW (ref 3.3–5)
ALP SERPL-CCNC: 76 U/L — SIGNIFICANT CHANGE UP (ref 40–120)
ALT FLD-CCNC: 37 U/L RC — SIGNIFICANT CHANGE UP (ref 10–45)
ANION GAP SERPL CALC-SCNC: 7 MMOL/L — SIGNIFICANT CHANGE UP (ref 5–17)
AST SERPL-CCNC: 22 U/L — SIGNIFICANT CHANGE UP (ref 10–40)
BILIRUB SERPL-MCNC: 0.3 MG/DL — SIGNIFICANT CHANGE UP (ref 0.2–1.2)
BUN SERPL-MCNC: 13 MG/DL — SIGNIFICANT CHANGE UP (ref 7–23)
CALCIUM SERPL-MCNC: 8.2 MG/DL — LOW (ref 8.4–10.5)
CHLORIDE SERPL-SCNC: 109 MMOL/L — HIGH (ref 96–108)
CO2 SERPL-SCNC: 25 MMOL/L — SIGNIFICANT CHANGE UP (ref 22–31)
CREAT SERPL-MCNC: 0.73 MG/DL — SIGNIFICANT CHANGE UP (ref 0.5–1.3)
GLUCOSE SERPL-MCNC: 73 MG/DL — SIGNIFICANT CHANGE UP (ref 70–99)
HCT VFR BLD CALC: 41.4 % — SIGNIFICANT CHANGE UP (ref 39–50)
HGB BLD-MCNC: 12.7 G/DL — LOW (ref 13–17)
MAGNESIUM SERPL-MCNC: 2.1 MG/DL — SIGNIFICANT CHANGE UP (ref 1.6–2.6)
MCHC RBC-ENTMCNC: 27.2 PG — SIGNIFICANT CHANGE UP (ref 27–34)
MCHC RBC-ENTMCNC: 30.7 GM/DL — LOW (ref 32–36)
MCV RBC AUTO: 88.5 FL — SIGNIFICANT CHANGE UP (ref 80–100)
PHOSPHATE SERPL-MCNC: 3.4 MG/DL — SIGNIFICANT CHANGE UP (ref 2.5–4.5)
PLATELET # BLD AUTO: 228 K/UL — SIGNIFICANT CHANGE UP (ref 150–400)
POTASSIUM SERPL-MCNC: 3.9 MMOL/L — SIGNIFICANT CHANGE UP (ref 3.5–5.3)
POTASSIUM SERPL-SCNC: 3.9 MMOL/L — SIGNIFICANT CHANGE UP (ref 3.5–5.3)
PROT SERPL-MCNC: 5.1 G/DL — LOW (ref 6–8.3)
RBC # BLD: 4.68 M/UL — SIGNIFICANT CHANGE UP (ref 4.2–5.8)
RBC # FLD: 19.5 % — HIGH (ref 10.3–14.5)
SODIUM SERPL-SCNC: 141 MMOL/L — SIGNIFICANT CHANGE UP (ref 135–145)
WBC # BLD: 3.8 K/UL — SIGNIFICANT CHANGE UP (ref 3.8–10.5)
WBC # FLD AUTO: 3.8 K/UL — SIGNIFICANT CHANGE UP (ref 3.8–10.5)

## 2018-01-21 PROCEDURE — 99232 SBSQ HOSP IP/OBS MODERATE 35: CPT | Mod: GC

## 2018-01-21 RX ORDER — IFOSFAMIDE 1 G/1
10750 INJECTION, POWDER, LYOPHILIZED, FOR SOLUTION INTRAVENOUS ONCE
Qty: 0 | Refills: 0 | Status: DISCONTINUED | OUTPATIENT
Start: 2018-01-21 | End: 2018-01-22

## 2018-01-21 RX ORDER — CARBOPLATIN 50 MG
750 VIAL (EA) INTRAVENOUS ONCE
Qty: 0 | Refills: 0 | Status: DISCONTINUED | OUTPATIENT
Start: 2018-01-21 | End: 2018-01-22

## 2018-01-21 RX ADMIN — ONDANSETRON 8 MILLIGRAM(S): 8 TABLET, FILM COATED ORAL at 21:02

## 2018-01-21 RX ADMIN — SODIUM CHLORIDE 100 MILLILITER(S): 9 INJECTION INTRAMUSCULAR; INTRAVENOUS; SUBCUTANEOUS at 06:03

## 2018-01-21 RX ADMIN — SODIUM CHLORIDE 100 MILLILITER(S): 9 INJECTION INTRAMUSCULAR; INTRAVENOUS; SUBCUTANEOUS at 17:31

## 2018-01-21 RX ADMIN — ONDANSETRON 8 MILLIGRAM(S): 8 TABLET, FILM COATED ORAL at 13:48

## 2018-01-21 RX ADMIN — SODIUM CHLORIDE 100 MILLILITER(S): 9 INJECTION INTRAMUSCULAR; INTRAVENOUS; SUBCUTANEOUS at 21:02

## 2018-01-21 RX ADMIN — ONDANSETRON 8 MILLIGRAM(S): 8 TABLET, FILM COATED ORAL at 06:04

## 2018-01-21 NOTE — PROGRESS NOTE ADULT - PROBLEM SELECTOR PLAN 1
Monitor CBC with diff.  Transfuse PRN.  Monitor electrolytes.  Supplement as needed.  Strict I/O  Continue on Chemotherapy.  Continue IVF.  Mouth care.  Strict I/O  Briggs weights, diuresis PRN.  D/C planning for 1/22.

## 2018-01-21 NOTE — ADVANCED PRACTICE NURSE CONSULT - ASSESSMENT
Lab results as follows: wbc 3.8 Hgb 12.7 hct 41.4 Platelet count 228. Creatinine 0.73 Total bili 0.3. Up and about by self. Right chestwall double lumen powerport patent one port in used accessed yesterday. Reinforced teachings to patient re: his chemo regimen well understood. Got a dose of zofran 8mg IV ATC every 8 hours as antiemetic. Denies nausea. Tolerated lunch meal. Carboplatin ((AUC=5)=750mg in 500ml NSS started at 1145 x 1 hour infusion,completed at 1245 via NSS line through y-line tubing into right chestwall powerport. Tolerated. Etoposide 100mg/n6=500ci in 1000ml NSS started at 1330 x 2 hours infusion at 505ml/hr via NSS line through y-line tubing into right chestwall powerport. Patient aware of common signs of unusual reactions to watch like dizziness nor lightheadedness. Ifosfamide 5000mg/w1=[id number] with Mesna [id number] started at 1330 as civ x 24hours at 56ml/hr attached to the other port of y-line tubing through right chestwall powerport. Primary RN aware of plan of care. patient to get last dose of Etoposide tomorrow then discharge for home. Safety maintained.

## 2018-01-21 NOTE — PROGRESS NOTE ADULT - ATTENDING COMMENTS
C3 RICE  Transplant not covered here  HAs appt with [facility] next Wednesday  ON discharge will get neulasta (normal dosing- no collection planned as yet).

## 2018-01-21 NOTE — PROGRESS NOTE ADULT - SUBJECTIVE AND OBJECTIVE BOX
Diagnosis:    Protocol/Chemo Regimen:  Day:     Pt endorsed:    Review of Systems:    Pain scale:                                Location:    Diet:     Allergies    No Known Allergies    Intolerances        ANTIMICROBIALS      HEME/ONC MEDICATIONS  CARBOplatin IVPB 750 milliGRAM(s) IV Intermittent once  etoposide IVPB 215 milliGRAM(s) IV Intermittent daily  ifosfamide IVPB w/additives 79970 milliGRAM(s) IV Intermittent once      STANDING MEDICATIONS  ondansetron Injectable 8 milliGRAM(s) IV Push every 8 hours  sodium chloride 0.9%. 1000 milliLiter(s) IV Continuous <Continuous>      PRN MEDICATIONS  metoclopramide Injectable 10 milliGRAM(s) IV Push every 6 hours PRN        Vital Signs Last 24 Hrs  T(C): 35.9 (21 Jan 2018 10:01), Max: 36 (20 Jan 2018 11:56)  T(F): 96.7 (21 Jan 2018 10:01), Max: 96.8 (20 Jan 2018 11:56)  HR: 68 (21 Jan 2018 10:01) (65 - 96)  BP: 117/73 (21 Jan 2018 10:01) (99/64 - 117/73)  BP(mean): --  RR: 18 (21 Jan 2018 10:01) (18 - 18)  SpO2: 99% (21 Jan 2018 10:01) (96% - 99%)    PHYSICAL EXAM  General: adult in NAD  HEENT: clear oropharynx, anicteric sclera, pink conjunctiva  Neck: supple  CV: normal S1/S2 with no murmur rubs or gallops  Lungs: positive air movement b/l ant lungs,clear to auscultation, no wheezes, no rales  Abdomen: soft non-tender non-distended, no hepatosplenomegaly  Ext: no clubbing cyanosis or edema  Skin: no rashes and no petechiae  Neuro: alert and oriented X 4, no focal deficits  Central Line: normal    LABS:    Blood Cultures:                           12.7   3.8   )-----------( 228      ( 21 Jan 2018 07:14 )             41.4         Mean Cell Volume : 88.5 fl  Mean Cell Hemoglobin : 27.2 pg  Mean Cell Hemoglobin Concentration : 30.7 gm/dL  Auto Neutrophil # : x  Auto Lymphocyte # : x  Auto Monocyte # : x  Auto Eosinophil # : x  Auto Basophil # : x  Auto Neutrophil % : x  Auto Lymphocyte % : x  Auto Monocyte % : x  Auto Eosinophil % : x  Auto Basophil % : x      01-21    141  |  109<H>  |  13  ----------------------------<  73  3.9   |  25  |  0.73    Ca    8.2<L>      21 Jan 2018 07:14  Phos  3.4     01-21  Mg     2.1     01-21    TPro  5.1<L>  /  Alb  2.8<L>  /  TBili  0.3  /  DBili  x   /  AST  22  /  ALT  37  /  AlkPhos  76  01-21      Mg 2.1  Phos 3.4      PT/INR - ( 20 Jan 2018 14:23 )   PT: 12.0 sec;   INR: 1.10 ratio         PTT - ( 20 Jan 2018 14:23 )  PTT:33.7 sec      Uric Acid 5.0        Culture:  RECENT CULTURES:      MICROBIOLOGY:    CARBOplatin IVPB 750 milliGRAM(s) IV Intermittent once  etoposide IVPB 215 milliGRAM(s) IV Intermittent daily  ifosfamide IVPB w/additives 66440 milliGRAM(s) IV Intermittent once    ondansetron Injectable 8 milliGRAM(s) IV Push every 8 hours  sodium chloride 0.9%. 1000 milliLiter(s) IV Continuous <Continuous>      RADIOLOGY & ADDITIONAL STUDIES: Diagnosis: Relapsed hodgkins    Protocol/Chemo Regimen: Cycle 3 ICE    Day: 2     Pt endorsed: Feeling well    Review of Systems: Denies any chest pain, palpitation, SOB, abdominal pain or dysuria.    Pain scale: Denies                               Diet: regular    Allergies: No Known Allergies    HEME/ONC MEDICATIONS  CARBOplatin IVPB 750 milliGRAM(s) IV Intermittent once  etoposide IVPB 215 milliGRAM(s) IV Intermittent daily  ifosfamide IVPB w/additives 25719 milliGRAM(s) IV Intermittent once      STANDING MEDICATIONS  ondansetron Injectable 8 milliGRAM(s) IV Push every 8 hours  sodium chloride 0.9%. 1000 milliLiter(s) IV Continuous <Continuous>      PRN MEDICATIONS  metoclopramide Injectable 10 milliGRAM(s) IV Push every 6 hours PRN        Vital Signs Last 24 Hrs  T(C): 35.9 (21 Jan 2018 10:01), Max: 36 (20 Jan 2018 11:56)  T(F): 96.7 (21 Jan 2018 10:01), Max: 96.8 (20 Jan 2018 11:56)  HR: 68 (21 Jan 2018 10:01) (65 - 96)  BP: 117/73 (21 Jan 2018 10:01) (99/64 - 117/73)  BP(mean): --  RR: 18 (21 Jan 2018 10:01) (18 - 18)  SpO2: 99% (21 Jan 2018 10:01) (96% - 99%)    PHYSICAL EXAM  General: adult in NAD  HEENT: clear oropharynx  Neck: supple  CV: normal S1/S2 , RRR  Lungs: positive air movement b/l ant lungs, clear to auscultation, no wheezes, no rales  Abdomen: soft non-tender non-distended, + BS  Ext: no clubbing cyanosis or edema  Skin: no rashes and no petechiae  Neuro: alert and oriented X 4  Central Line: RCW mediport, CDI    LABS:                        12.7   3.8   )-----------( 228      ( 21 Jan 2018 07:14 )             41.4         Mean Cell Volume : 88.5 fl  Mean Cell Hemoglobin : 27.2 pg  Mean Cell Hemoglobin Concentration : 30.7 gm/dL  Auto Neutrophil # : x  Auto Lymphocyte # : x  Auto Monocyte # : x  Auto Eosinophil # : x  Auto Basophil # : x  Auto Neutrophil % : x  Auto Lymphocyte % : x  Auto Monocyte % : x  Auto Eosinophil % : x  Auto Basophil % : x      01-21     141  |  109<H>  |  13  ----------------------------<  73  3.9   |  25  |  0.73    Ca    8.2<L>      21 Jan 2018 07:14  Phos  3.4     01-21  Mg     2.1     01-21    TPro  5.1<L>  /  Alb  2.8<L>  /  TBili  0.3  /  DBili  x   /  AST  22  /  ALT  37  /  AlkPhos  76  01-21      Mg 2.1  Phos 3.4      PT/INR - ( 20 Jan 2018 14:23 )   PT: 12.0 sec;   INR: 1.10 ratio         PTT - ( 20 Jan 2018 14:23 )  PTT:33.7 sec      Uric Acid 5.0      MICROBIOLOGY:    CARBOplatin IVPB 750 milliGRAM(s) IV Intermittent once  etoposide IVPB 215 milliGRAM(s) IV Intermittent daily  ifosfamide IVPB w/additives 64354 milliGRAM(s) IV Intermittent once    ondansetron Injectable 8 milliGRAM(s) IV Push every 8 hours  sodium chloride 0.9%. 1000 milliLiter(s) IV Continuous <Continuous>      RADIOLOGY & ADDITIONAL STUDIES:   CT Abdomen and Pelvis w/ Oral Cont and w/ IV Cont (11.24.17 @ 14:33)   Extensive mesenteric, retroperitoneal and pelvic adenopathy not   significantly changed from prior abdominal imaging 11/1/2017.    New/enlarging bilateral axillary adenopathy since PET CT 9/16/2017.    Large left pleural effusion with interval increase and questionable left   pleural nodularity. Resultant shift of the mediastinal structures to the   right.  Collapse of the left lower lobe and lingula.

## 2018-01-22 ENCOUNTER — OUTPATIENT (OUTPATIENT)
Dept: OUTPATIENT SERVICES | Facility: HOSPITAL | Age: 38
LOS: 1 days | Discharge: HOME | End: 2018-01-22

## 2018-01-22 ENCOUNTER — TRANSCRIPTION ENCOUNTER (OUTPATIENT)
Age: 38
End: 2018-01-22

## 2018-01-22 VITALS
TEMPERATURE: 98 F | HEART RATE: 70 BPM | DIASTOLIC BLOOD PRESSURE: 70 MMHG | SYSTOLIC BLOOD PRESSURE: 116 MMHG | RESPIRATION RATE: 18 BRPM | OXYGEN SATURATION: 96 %

## 2018-01-22 DIAGNOSIS — C81.90 HODGKIN LYMPHOMA, UNSPECIFIED, UNSPECIFIED SITE: ICD-10-CM

## 2018-01-22 LAB
ALBUMIN SERPL ELPH-MCNC: 2.8 G/DL — LOW (ref 3.3–5)
ALP SERPL-CCNC: 73 U/L — SIGNIFICANT CHANGE UP (ref 40–120)
ALT FLD-CCNC: 31 U/L RC — SIGNIFICANT CHANGE UP (ref 10–45)
ANION GAP SERPL CALC-SCNC: 10 MMOL/L — SIGNIFICANT CHANGE UP (ref 5–17)
AST SERPL-CCNC: 17 U/L — SIGNIFICANT CHANGE UP (ref 10–40)
BILIRUB SERPL-MCNC: 0.5 MG/DL — SIGNIFICANT CHANGE UP (ref 0.2–1.2)
BUN SERPL-MCNC: 11 MG/DL — SIGNIFICANT CHANGE UP (ref 7–23)
CALCIUM SERPL-MCNC: 8.3 MG/DL — LOW (ref 8.4–10.5)
CHLORIDE SERPL-SCNC: 106 MMOL/L — SIGNIFICANT CHANGE UP (ref 96–108)
CO2 SERPL-SCNC: 23 MMOL/L — SIGNIFICANT CHANGE UP (ref 22–31)
CREAT SERPL-MCNC: 0.7 MG/DL — SIGNIFICANT CHANGE UP (ref 0.5–1.3)
GLUCOSE SERPL-MCNC: 78 MG/DL — SIGNIFICANT CHANGE UP (ref 70–99)
HCT VFR BLD CALC: 42.2 % — SIGNIFICANT CHANGE UP (ref 39–50)
HGB BLD-MCNC: 13.1 G/DL — SIGNIFICANT CHANGE UP (ref 13–17)
MAGNESIUM SERPL-MCNC: 1.8 MG/DL — SIGNIFICANT CHANGE UP (ref 1.6–2.6)
MCHC RBC-ENTMCNC: 27.4 PG — SIGNIFICANT CHANGE UP (ref 27–34)
MCHC RBC-ENTMCNC: 31.1 GM/DL — LOW (ref 32–36)
MCV RBC AUTO: 88 FL — SIGNIFICANT CHANGE UP (ref 80–100)
PHOSPHATE SERPL-MCNC: 2.9 MG/DL — SIGNIFICANT CHANGE UP (ref 2.5–4.5)
PLATELET # BLD AUTO: 221 K/UL — SIGNIFICANT CHANGE UP (ref 150–400)
POTASSIUM SERPL-MCNC: 3.5 MMOL/L — SIGNIFICANT CHANGE UP (ref 3.5–5.3)
POTASSIUM SERPL-SCNC: 3.5 MMOL/L — SIGNIFICANT CHANGE UP (ref 3.5–5.3)
PROT SERPL-MCNC: 5.2 G/DL — LOW (ref 6–8.3)
RBC # BLD: 4.8 M/UL — SIGNIFICANT CHANGE UP (ref 4.2–5.8)
RBC # FLD: 19.4 % — HIGH (ref 10.3–14.5)
SODIUM SERPL-SCNC: 139 MMOL/L — SIGNIFICANT CHANGE UP (ref 135–145)
WBC # BLD: 5.4 K/UL — SIGNIFICANT CHANGE UP (ref 3.8–10.5)
WBC # FLD AUTO: 5.4 K/UL — SIGNIFICANT CHANGE UP (ref 3.8–10.5)

## 2018-01-22 PROCEDURE — 85730 THROMBOPLASTIN TIME PARTIAL: CPT

## 2018-01-22 PROCEDURE — 85610 PROTHROMBIN TIME: CPT

## 2018-01-22 PROCEDURE — 84100 ASSAY OF PHOSPHORUS: CPT

## 2018-01-22 PROCEDURE — 84550 ASSAY OF BLOOD/URIC ACID: CPT

## 2018-01-22 PROCEDURE — 83735 ASSAY OF MAGNESIUM: CPT

## 2018-01-22 PROCEDURE — 99239 HOSP IP/OBS DSCHRG MGMT >30: CPT

## 2018-01-22 PROCEDURE — 86901 BLOOD TYPING SEROLOGIC RH(D): CPT

## 2018-01-22 PROCEDURE — 86900 BLOOD TYPING SEROLOGIC ABO: CPT

## 2018-01-22 PROCEDURE — 80053 COMPREHEN METABOLIC PANEL: CPT

## 2018-01-22 PROCEDURE — 86850 RBC ANTIBODY SCREEN: CPT

## 2018-01-22 PROCEDURE — 85027 COMPLETE CBC AUTOMATED: CPT

## 2018-01-22 PROCEDURE — 83615 LACTATE (LD) (LDH) ENZYME: CPT

## 2018-01-22 RX ADMIN — SODIUM CHLORIDE 100 MILLILITER(S): 9 INJECTION INTRAMUSCULAR; INTRAVENOUS; SUBCUTANEOUS at 05:26

## 2018-01-22 RX ADMIN — ONDANSETRON 8 MILLIGRAM(S): 8 TABLET, FILM COATED ORAL at 14:10

## 2018-01-22 RX ADMIN — ONDANSETRON 8 MILLIGRAM(S): 8 TABLET, FILM COATED ORAL at 05:27

## 2018-01-22 RX ADMIN — Medication 10 MILLIGRAM(S): at 13:02

## 2018-01-22 NOTE — PROGRESS NOTE ADULT - ASSESSMENT
37yoM with Stage IV Hodgkin's Lymphoma diagnosed in 01/2017,  patient was treated with 6 cycles of AVD (doxorubicin, vinblastine, dacarbazine) starting in Feb 2017, with posttreatment PET revealing persistent disease, and with preeti-aortic lymph node biopsy, revealing persistent classical Hodgkin's Lymphoma. Patient is admitted fro his cycle # 3 ICE
37yoM with Stage IV Hodgkin's Lymphoma diagnosed in 01/2017,  patient was treated with 6 cycles of AVD (doxorubicin, vinblastine, dacarbazine) starting in Feb 2017, with posttreatment PET revealing persistent disease, and with preeti-aortic lymph node biopsy, revealing persistent classical Hodgkin's Lymphoma. Patient is admitted fro his cycle # 3 ICE

## 2018-01-22 NOTE — DISCHARGE NOTE ADULT - HOSPITAL COURSE
37yoM with Stage IV Hodgkin's Lymphoma diagnosed in 01/2017,  patient was treated with 6 cycles of AVD (doxorubicin, vinblastine, dacarbazine) starting in Feb 2017, with posttreatment PET revealing persistent disease, and with preeti-aortic lymph node biopsy, revealing persistent classical Hodgkin's Lymphoma. He was started on Cycle 1 of ICE on 11/25, with complete resolution of abdominal distension and pain by cycle's completion. Patient received cycle 2 on 12/14/207 and now he is admitted for cycle 3 of chemo prior to transplant.   During hospitalization he received IV hydration, maintained strict I/O, mouth care, and monitored daily weights. Vital signs were monitored closely, tolerated chemotherapy well with out any side effects, received antiemetics around the clock. He was discharged with strict instructions to follow up outpatient for Lili and with Dr. Mccallum.

## 2018-01-22 NOTE — PROGRESS NOTE ADULT - PROBLEM SELECTOR PLAN 1
Monitor CBC with diff.  Transfuse PRN.  Monitor electrolytes.  Supplement as needed.  Strict I/O  Continue on Chemotherapy.  Continue IVF.  Mouth care.  Strict I/O  Briggs weights, diuresis PRN.  D/C planning for 1/22. Monitor CBC with diff.  Transfuse PRN.  Monitor electrolytes.  Supplement as needed.  Strict I/O  Continue on Chemotherapy.  Continue IVF.  Mouth care.  Strict I/O  Briggs weights, diuresis PRN.  D/C planning for today.

## 2018-01-22 NOTE — PROGRESS NOTE ADULT - PROBLEM SELECTOR PLAN 2
Not neutropenic.  If febrile pan culture and f/u culture results.
Not neutropenic.  If febrile pan culture and f/u culture results.

## 2018-01-22 NOTE — PROGRESS NOTE ADULT - ATTENDING COMMENTS
C3 RICE  Transplant not covered here  HAs appt with [facility] next Wednesday  ON discharge will get neulasta (normal dosing- no collection planned as yet). 37yoM with Stage IV Hodgkin's Lymphoma diagnosed in 01/2017,  s/p 6 cycles of AVD (doxorubicin, vinblastine, dacarbazine) , with posttreatment PET revealing persistent disease. Patient is now admitted for cycle # 3 ICE regimen    C3D3  No complications  dc planning today  Transplant eval as outpt - appt at [facility]  scheduled for neulasta 1/23.

## 2018-01-22 NOTE — DISCHARGE NOTE ADULT - ADDITIONAL INSTRUCTIONS
You have an appointment to see Dr. Mccallum on 1/24/2018 at 12 N at [facility].  You have an appointment for Neulasta on 1/24/2018 at 12: 40 at [facility].

## 2018-01-22 NOTE — DISCHARGE NOTE ADULT - PLAN OF CARE
Remain free from infection, maintain blood counts Notify your physician if bleeding; swelling; persistent nausea and vomiting; unable to urinate; pain not relieved by medications; fever; numbness, tingling; excessive diarrhea, inability to tolerate liquids or foods; increased irritability or sluggishness, rash

## 2018-01-22 NOTE — DISCHARGE NOTE ADULT - MEDICATION SUMMARY - MEDICATIONS TO TAKE
I will START or STAY ON the medications listed below when I get home from the hospital:    metoclopramide 10 mg oral tablet  -- 1 tab(s) by mouth 4 times a day As Needed for Nausea/Vomiting  -- Indication: For nausea/vomiting

## 2018-01-22 NOTE — DISCHARGE NOTE ADULT - CARE PROVIDER_API CALL
Hyacinth Mccallum), Hematology; Internal Medicine; Medical Oncology  [address] [place] [address]  Phone: [phone]  Fax: [phone]

## 2018-01-22 NOTE — ADVANCED PRACTICE NURSE CONSULT - ASSESSMENT
Arrive to find pt up in bed alert and oriented times four. Pt ambulate to bathroom with steady gait, no distress noted. Mediport to right chest wall previously access remain with +blood return and flush easily. Chemotherapy teaching done pt verbalized understanding, also given drug cards which outline side effect. Lab result reviewed by DR Cali and team during rounds. Pt has been pre-medicated with zofran 8 mg iv by primary nurse. Drug verification done with primary nurse. At 1300 given Etoposide 100 mg/m2= 215 mg iv to run over 2 hours. Left pt in bed with family at bedside. Report given to primary nurse.

## 2018-01-24 ENCOUNTER — APPOINTMENT (OUTPATIENT)
Dept: INFUSION THERAPY | Facility: HOSPITAL | Age: 38
End: 2018-01-24

## 2018-01-24 ENCOUNTER — APPOINTMENT (OUTPATIENT)
Dept: HEMATOLOGY ONCOLOGY | Facility: CLINIC | Age: 38
End: 2018-01-24

## 2018-01-25 ENCOUNTER — APPOINTMENT (OUTPATIENT)
Dept: HEMATOLOGY ONCOLOGY | Facility: CLINIC | Age: 38
End: 2018-01-25

## 2018-01-25 DIAGNOSIS — Z51.89 ENCOUNTER FOR OTHER SPECIFIED AFTERCARE: ICD-10-CM

## 2018-01-25 NOTE — DISCHARGE NOTE ADULT - ADDITIONAL INSTRUCTIONS
No Follow up with Dr. Leggett within 1 week of discharge. Follow up with [facility] regarding further chemo treatments. Follow up with Dr. Leggett within 1 week of discharge. Follow up with [facility] regarding further chemo treatments.  Follow up with Dr. Coleman, cardiology, in 1 week.

## 2018-02-27 ENCOUNTER — OUTPATIENT (OUTPATIENT)
Dept: OUTPATIENT SERVICES | Facility: HOSPITAL | Age: 38
LOS: 1 days | Discharge: HOME | End: 2018-02-27

## 2018-02-27 DIAGNOSIS — C81.90 HODGKIN LYMPHOMA, UNSPECIFIED, UNSPECIFIED SITE: ICD-10-CM

## 2018-03-01 ENCOUNTER — RESULT REVIEW (OUTPATIENT)
Age: 38
End: 2018-03-01

## 2018-03-01 ENCOUNTER — APPOINTMENT (OUTPATIENT)
Dept: HEMATOLOGY ONCOLOGY | Facility: CLINIC | Age: 38
End: 2018-03-01
Payer: COMMERCIAL

## 2018-03-01 VITALS
DIASTOLIC BLOOD PRESSURE: 74 MMHG | OXYGEN SATURATION: 99 % | BODY MASS INDEX: 31.88 KG/M2 | TEMPERATURE: 98.3 F | SYSTOLIC BLOOD PRESSURE: 120 MMHG | WEIGHT: 222.67 LBS | HEART RATE: 68 BPM | RESPIRATION RATE: 18 BRPM

## 2018-03-01 LAB
BASOPHILS # BLD AUTO: 0 K/UL — SIGNIFICANT CHANGE UP (ref 0–0.2)
BASOPHILS NFR BLD AUTO: 0.9 % — SIGNIFICANT CHANGE UP (ref 0–2)
EOSINOPHIL # BLD AUTO: 0.2 K/UL — SIGNIFICANT CHANGE UP (ref 0–0.5)
EOSINOPHIL NFR BLD AUTO: 5.5 % — SIGNIFICANT CHANGE UP (ref 0–6)
HCT VFR BLD CALC: 42 % — SIGNIFICANT CHANGE UP (ref 39–50)
HGB BLD-MCNC: 14.4 G/DL — SIGNIFICANT CHANGE UP (ref 13–17)
LYMPHOCYTES # BLD AUTO: 0.6 K/UL — LOW (ref 1–3.3)
LYMPHOCYTES # BLD AUTO: 14.5 % — SIGNIFICANT CHANGE UP (ref 13–44)
MCHC RBC-ENTMCNC: 30.8 PG — SIGNIFICANT CHANGE UP (ref 27–34)
MCHC RBC-ENTMCNC: 34.2 G/DL — SIGNIFICANT CHANGE UP (ref 32–36)
MCV RBC AUTO: 89.9 FL — SIGNIFICANT CHANGE UP (ref 80–100)
MONOCYTES # BLD AUTO: 0.4 K/UL — SIGNIFICANT CHANGE UP (ref 0–0.9)
MONOCYTES NFR BLD AUTO: 8.4 % — SIGNIFICANT CHANGE UP (ref 2–14)
NEUTROPHILS # BLD AUTO: 3.1 K/UL — SIGNIFICANT CHANGE UP (ref 1.8–7.4)
NEUTROPHILS NFR BLD AUTO: 70.7 % — SIGNIFICANT CHANGE UP (ref 43–77)
PLATELET # BLD AUTO: 233 K/UL — SIGNIFICANT CHANGE UP (ref 150–400)
RBC # BLD: 4.67 M/UL — SIGNIFICANT CHANGE UP (ref 4.2–5.8)
RBC # FLD: 16.4 % — HIGH (ref 10.3–14.5)
WBC # BLD: 4.4 K/UL — SIGNIFICANT CHANGE UP (ref 3.8–10.5)
WBC # FLD AUTO: 4.4 K/UL — SIGNIFICANT CHANGE UP (ref 3.8–10.5)

## 2018-03-01 PROCEDURE — 99214 OFFICE O/P EST MOD 30 MIN: CPT

## 2018-03-02 ENCOUNTER — APPOINTMENT (OUTPATIENT)
Dept: INFUSION THERAPY | Facility: HOSPITAL | Age: 38
End: 2018-03-02

## 2018-03-05 DIAGNOSIS — R11.2 NAUSEA WITH VOMITING, UNSPECIFIED: ICD-10-CM

## 2018-03-05 DIAGNOSIS — Z51.11 ENCOUNTER FOR ANTINEOPLASTIC CHEMOTHERAPY: ICD-10-CM

## 2018-03-06 ENCOUNTER — APPOINTMENT (OUTPATIENT)
Dept: INFUSION THERAPY | Facility: HOSPITAL | Age: 38
End: 2018-03-06

## 2018-03-13 ENCOUNTER — FORM ENCOUNTER (OUTPATIENT)
Age: 38
End: 2018-03-13

## 2018-03-14 ENCOUNTER — RESULT REVIEW (OUTPATIENT)
Age: 38
End: 2018-03-14

## 2018-03-14 ENCOUNTER — OUTPATIENT (OUTPATIENT)
Dept: OUTPATIENT SERVICES | Facility: HOSPITAL | Age: 38
LOS: 1 days | End: 2018-03-14
Payer: COMMERCIAL

## 2018-03-14 ENCOUNTER — APPOINTMENT (OUTPATIENT)
Dept: HEMATOLOGY ONCOLOGY | Facility: CLINIC | Age: 38
End: 2018-03-14

## 2018-03-14 ENCOUNTER — APPOINTMENT (OUTPATIENT)
Dept: ULTRASOUND IMAGING | Facility: IMAGING CENTER | Age: 38
End: 2018-03-14
Payer: COMMERCIAL

## 2018-03-14 DIAGNOSIS — C81.90 HODGKIN LYMPHOMA, UNSPECIFIED, UNSPECIFIED SITE: ICD-10-CM

## 2018-03-14 LAB
B PERT IGG+IGM PNL SER: ABNORMAL
BASOPHILS # BLD AUTO: 0 K/UL — SIGNIFICANT CHANGE UP (ref 0–0.2)
BASOPHILS NFR BLD AUTO: 0 % — SIGNIFICANT CHANGE UP (ref 0–2)
COLOR FLD: YELLOW — SIGNIFICANT CHANGE UP
EOSINOPHIL # BLD AUTO: 0.3 K/UL — SIGNIFICANT CHANGE UP (ref 0–0.5)
EOSINOPHIL NFR BLD AUTO: 8.9 % — HIGH (ref 0–6)
FLUID INTAKE SUBSTANCE CLASS: SIGNIFICANT CHANGE UP
FLUID SEGMENTED GRANULOCYTES: 11 % — SIGNIFICANT CHANGE UP
GLUCOSE FLD-MCNC: 102 MG/DL — SIGNIFICANT CHANGE UP
HCT VFR BLD CALC: 43.1 % — SIGNIFICANT CHANGE UP (ref 39–50)
HGB BLD-MCNC: 14.9 G/DL — SIGNIFICANT CHANGE UP (ref 13–17)
LDH SERPL L TO P-CCNC: 118 U/L — SIGNIFICANT CHANGE UP
LYMPHOCYTES # BLD AUTO: 0.5 K/UL — LOW (ref 1–3.3)
LYMPHOCYTES # BLD AUTO: 14.9 % — SIGNIFICANT CHANGE UP (ref 13–44)
LYMPHOCYTES # FLD: 69 % — SIGNIFICANT CHANGE UP
MCHC RBC-ENTMCNC: 31.1 PG — SIGNIFICANT CHANGE UP (ref 27–34)
MCHC RBC-ENTMCNC: 34.6 G/DL — SIGNIFICANT CHANGE UP (ref 32–36)
MCV RBC AUTO: 89.8 FL — SIGNIFICANT CHANGE UP (ref 80–100)
MESOTHL CELL # FLD: 2 % — SIGNIFICANT CHANGE UP
MONOCYTES # BLD AUTO: 0.3 K/UL — SIGNIFICANT CHANGE UP (ref 0–0.9)
MONOCYTES NFR BLD AUTO: 8.1 % — SIGNIFICANT CHANGE UP (ref 2–14)
MONOS+MACROS # FLD: 18 % — SIGNIFICANT CHANGE UP
NEUTROPHILS # BLD AUTO: 2.4 K/UL — SIGNIFICANT CHANGE UP (ref 1.8–7.4)
NEUTROPHILS NFR BLD AUTO: 68 % — SIGNIFICANT CHANGE UP (ref 43–77)
PLATELET # BLD AUTO: 181 K/UL — SIGNIFICANT CHANGE UP (ref 150–400)
PROT FLD-MCNC: 3.6 G/DL — SIGNIFICANT CHANGE UP
RBC # BLD: 4.8 M/UL — SIGNIFICANT CHANGE UP (ref 4.2–5.8)
RBC # FLD: 14.4 % — SIGNIFICANT CHANGE UP (ref 10.3–14.5)
RCV VOL RI: 43 /UL — HIGH (ref 0–5)
SPECIMEN SOURCE FLD: SIGNIFICANT CHANGE UP
TOTAL NUCLEATED CELL COUNT, BODY FLUID: 143 /UL — HIGH (ref 0–5)
TUBE TYPE: SIGNIFICANT CHANGE UP
WBC # BLD: 3.6 K/UL — LOW (ref 3.8–10.5)
WBC # FLD AUTO: 3.6 K/UL — LOW (ref 3.8–10.5)

## 2018-03-14 PROCEDURE — 84157 ASSAY OF PROTEIN OTHER: CPT

## 2018-03-14 PROCEDURE — 88112 CYTOPATH CELL ENHANCE TECH: CPT | Mod: 26

## 2018-03-14 PROCEDURE — 71046 X-RAY EXAM CHEST 2 VIEWS: CPT | Mod: 26

## 2018-03-14 PROCEDURE — 88112 CYTOPATH CELL ENHANCE TECH: CPT

## 2018-03-14 PROCEDURE — 32555 ASPIRATE PLEURA W/ IMAGING: CPT

## 2018-03-14 PROCEDURE — 88305 TISSUE EXAM BY PATHOLOGIST: CPT

## 2018-03-14 PROCEDURE — 82945 GLUCOSE OTHER FLUID: CPT

## 2018-03-14 PROCEDURE — 83615 LACTATE (LD) (LDH) ENZYME: CPT

## 2018-03-14 PROCEDURE — 71046 X-RAY EXAM CHEST 2 VIEWS: CPT

## 2018-03-14 PROCEDURE — 88305 TISSUE EXAM BY PATHOLOGIST: CPT | Mod: 26

## 2018-03-14 PROCEDURE — 89051 BODY FLUID CELL COUNT: CPT

## 2018-03-14 PROCEDURE — 32555 ASPIRATE PLEURA W/ IMAGING: CPT | Mod: LT

## 2018-03-16 LAB — NON-GYNECOLOGICAL CYTOLOGY STUDY: SIGNIFICANT CHANGE UP

## 2018-03-20 ENCOUNTER — RESULT REVIEW (OUTPATIENT)
Age: 38
End: 2018-03-20

## 2018-03-20 ENCOUNTER — APPOINTMENT (OUTPATIENT)
Dept: HEMATOLOGY ONCOLOGY | Facility: CLINIC | Age: 38
End: 2018-03-20
Payer: COMMERCIAL

## 2018-03-20 VITALS
OXYGEN SATURATION: 99 % | RESPIRATION RATE: 16 BRPM | BODY MASS INDEX: 32.5 KG/M2 | DIASTOLIC BLOOD PRESSURE: 77 MMHG | TEMPERATURE: 97.8 F | HEART RATE: 78 BPM | SYSTOLIC BLOOD PRESSURE: 114 MMHG | WEIGHT: 226.99 LBS

## 2018-03-20 LAB
ALBUMIN SERPL ELPH-MCNC: 3.2 G/DL
ALBUMIN SERPL ELPH-MCNC: 3.3 G/DL
ALBUMIN SERPL ELPH-MCNC: 3.3 G/DL
ALP BLD-CCNC: 120 U/L
ALP BLD-CCNC: 129 U/L
ALP BLD-CCNC: 95 U/L
ALT SERPL-CCNC: 33 U/L
ALT SERPL-CCNC: 75 U/L
ALT SERPL-CCNC: 96 U/L
ANION GAP SERPL CALC-SCNC: 10 MMOL/L
ANION GAP SERPL CALC-SCNC: 11 MMOL/L
ANION GAP SERPL CALC-SCNC: 13 MMOL/L
APTT BLD: 31.3 SEC
AST SERPL-CCNC: 18 U/L
AST SERPL-CCNC: 34 U/L
AST SERPL-CCNC: 52 U/L
BASOPHILS # BLD AUTO: 0 K/UL — SIGNIFICANT CHANGE UP (ref 0–0.2)
BASOPHILS NFR BLD AUTO: 0.5 % — SIGNIFICANT CHANGE UP (ref 0–2)
BILIRUB SERPL-MCNC: 0.4 MG/DL
BILIRUB SERPL-MCNC: <0.2 MG/DL
BILIRUB SERPL-MCNC: <0.2 MG/DL
BUN SERPL-MCNC: 13 MG/DL
BUN SERPL-MCNC: 18 MG/DL
BUN SERPL-MCNC: 22 MG/DL
CALCIUM SERPL-MCNC: 8.3 MG/DL
CALCIUM SERPL-MCNC: 8.5 MG/DL
CALCIUM SERPL-MCNC: 8.8 MG/DL
CHLORIDE SERPL-SCNC: 102 MMOL/L
CHLORIDE SERPL-SCNC: 105 MMOL/L
CHLORIDE SERPL-SCNC: 106 MMOL/L
CO2 SERPL-SCNC: 24 MMOL/L
CO2 SERPL-SCNC: 25 MMOL/L
CO2 SERPL-SCNC: 26 MMOL/L
CREAT SERPL-MCNC: 0.8 MG/DL
CREAT SERPL-MCNC: 0.83 MG/DL
CREAT SERPL-MCNC: 0.87 MG/DL
EOSINOPHIL # BLD AUTO: 0.4 K/UL — SIGNIFICANT CHANGE UP (ref 0–0.5)
EOSINOPHIL NFR BLD AUTO: 8 % — HIGH (ref 0–6)
GLUCOSE SERPL-MCNC: 89 MG/DL
GLUCOSE SERPL-MCNC: 89 MG/DL
GLUCOSE SERPL-MCNC: 94 MG/DL
HCT VFR BLD CALC: 42 % — SIGNIFICANT CHANGE UP (ref 39–50)
HGB BLD-MCNC: 14.7 G/DL — SIGNIFICANT CHANGE UP (ref 13–17)
INR PPP: 0.97 RATIO
LDH SERPL-CCNC: 161 U/L
LDH SERPL-CCNC: 171 U/L
LDH SERPL-CCNC: 218 U/L
LYMPHOCYTES # BLD AUTO: 0.8 K/UL — LOW (ref 1–3.3)
LYMPHOCYTES # BLD AUTO: 16.2 % — SIGNIFICANT CHANGE UP (ref 13–44)
MCHC RBC-ENTMCNC: 31.5 PG — SIGNIFICANT CHANGE UP (ref 27–34)
MCHC RBC-ENTMCNC: 35 G/DL — SIGNIFICANT CHANGE UP (ref 32–36)
MCV RBC AUTO: 89.8 FL — SIGNIFICANT CHANGE UP (ref 80–100)
MONOCYTES # BLD AUTO: 0.4 K/UL — SIGNIFICANT CHANGE UP (ref 0–0.9)
MONOCYTES NFR BLD AUTO: 7.7 % — SIGNIFICANT CHANGE UP (ref 2–14)
NEUTROPHILS # BLD AUTO: 3.2 K/UL — SIGNIFICANT CHANGE UP (ref 1.8–7.4)
NEUTROPHILS NFR BLD AUTO: 67.6 % — SIGNIFICANT CHANGE UP (ref 43–77)
PLATELET # BLD AUTO: 187 K/UL — SIGNIFICANT CHANGE UP (ref 150–400)
POTASSIUM SERPL-SCNC: 4 MMOL/L
POTASSIUM SERPL-SCNC: 4.4 MMOL/L
POTASSIUM SERPL-SCNC: 4.4 MMOL/L
PROT SERPL-MCNC: 5.5 G/DL
PROT SERPL-MCNC: 5.5 G/DL
PROT SERPL-MCNC: 5.6 G/DL
PT BLD: 11 SEC
RBC # BLD: 4.68 M/UL — SIGNIFICANT CHANGE UP (ref 4.2–5.8)
RBC # FLD: 13.4 % — SIGNIFICANT CHANGE UP (ref 10.3–14.5)
SODIUM SERPL-SCNC: 138 MMOL/L
SODIUM SERPL-SCNC: 142 MMOL/L
SODIUM SERPL-SCNC: 142 MMOL/L
URATE SERPL-MCNC: 4.5 MG/DL
URATE SERPL-MCNC: 4.8 MG/DL
URATE SERPL-MCNC: 5.4 MG/DL
WBC # BLD: 4.7 K/UL — SIGNIFICANT CHANGE UP (ref 3.8–10.5)
WBC # FLD AUTO: 4.7 K/UL — SIGNIFICANT CHANGE UP (ref 3.8–10.5)

## 2018-03-20 PROCEDURE — 99214 OFFICE O/P EST MOD 30 MIN: CPT

## 2018-03-20 RX ORDER — PREDNISONE 20 MG/1
20 TABLET ORAL
Qty: 10 | Refills: 0 | Status: ACTIVE | COMMUNITY
Start: 2018-03-20 | End: 1900-01-01

## 2018-03-23 ENCOUNTER — APPOINTMENT (OUTPATIENT)
Dept: INFUSION THERAPY | Facility: HOSPITAL | Age: 38
End: 2018-03-23

## 2018-04-18 LAB
ALBUMIN SERPL ELPH-MCNC: 3.3 G/DL
ALP BLD-CCNC: 93 U/L
ALT SERPL-CCNC: 76 U/L
ANION GAP SERPL CALC-SCNC: 11 MMOL/L
AST SERPL-CCNC: 36 U/L
BILIRUB SERPL-MCNC: 0.2 MG/DL
BUN SERPL-MCNC: 18 MG/DL
CALCIUM SERPL-MCNC: 8.5 MG/DL
CHLORIDE SERPL-SCNC: 105 MMOL/L
CO2 SERPL-SCNC: 25 MMOL/L
CREAT SERPL-MCNC: 0.82 MG/DL
GLUCOSE SERPL-MCNC: 92 MG/DL
LDH SERPL-CCNC: 153 U/L
POTASSIUM SERPL-SCNC: 4.1 MMOL/L
PROT SERPL-MCNC: 5.5 G/DL
SODIUM SERPL-SCNC: 141 MMOL/L
URATE SERPL-MCNC: 4.8 MG/DL

## 2018-07-26 ENCOUNTER — INBOUND DOCUMENT (OUTPATIENT)
Age: 38
End: 2018-07-26

## 2018-09-12 ENCOUNTER — INBOUND DOCUMENT (OUTPATIENT)
Age: 38
End: 2018-09-12

## 2018-10-24 NOTE — DISCHARGE NOTE ADULT - CARE PLAN
We received a fax from [address] [place] [address]. A 90 day supply on Omeprazole. 40 mg. The order is revised and pended to Dr. Wilbert Fleming. Principal Discharge DX:	Hodgkin lymphoma  Goal:	Remain free from infection, maintain blood counts  Assessment and plan of treatment:	Notify your physician if bleeding; swelling; persistent nausea and vomiting; unable to urinate; pain not relieved by medications; fever; numbness, tingling; excessive diarrhea, inability to tolerate liquids or foods; increased irritability or sluggishness, rash

## 2018-11-02 ENCOUNTER — INBOUND DOCUMENT (OUTPATIENT)
Age: 38
End: 2018-11-02

## 2018-11-12 PROBLEM — J90 PLEURAL EFFUSION: Status: ACTIVE | Noted: 2017-04-20

## 2018-11-12 PROBLEM — C81.90 HODGKIN'S LYMPHOMA, ADULT: Status: ACTIVE | Noted: 2017-02-14

## 2018-12-07 ENCOUNTER — INBOUND DOCUMENT (OUTPATIENT)
Age: 38
End: 2018-12-07

## 2019-04-25 ENCOUNTER — INBOUND DOCUMENT (OUTPATIENT)
Age: 39
End: 2019-04-25

## 2019-04-29 ENCOUNTER — INBOUND DOCUMENT (OUTPATIENT)
Age: 39
End: 2019-04-29

## 2019-07-15 ENCOUNTER — INBOUND DOCUMENT (OUTPATIENT)
Age: 39
End: 2019-07-15

## 2019-10-21 ENCOUNTER — EMERGENCY (EMERGENCY)
Facility: HOSPITAL | Age: 39
LOS: 1 days | Discharge: HOME | End: 2019-10-21
Attending: EMERGENCY MEDICINE | Admitting: EMERGENCY MEDICINE
Payer: COMMERCIAL

## 2019-10-21 VITALS
DIASTOLIC BLOOD PRESSURE: 70 MMHG | RESPIRATION RATE: 16 BRPM | SYSTOLIC BLOOD PRESSURE: 112 MMHG | TEMPERATURE: 98 F | OXYGEN SATURATION: 100 % | HEART RATE: 64 BPM

## 2019-10-21 VITALS
RESPIRATION RATE: 16 BRPM | TEMPERATURE: 98 F | HEART RATE: 74 BPM | OXYGEN SATURATION: 99 % | DIASTOLIC BLOOD PRESSURE: 74 MMHG | SYSTOLIC BLOOD PRESSURE: 116 MMHG

## 2019-10-21 LAB
ALBUMIN SERPL ELPH-MCNC: 3.8 G/DL — SIGNIFICANT CHANGE UP (ref 3.3–5)
ALP SERPL-CCNC: 92 U/L — SIGNIFICANT CHANGE UP (ref 40–120)
ALT FLD-CCNC: 58 U/L — HIGH (ref 4–41)
ANION GAP SERPL CALC-SCNC: 11 MMO/L — SIGNIFICANT CHANGE UP (ref 7–14)
AST SERPL-CCNC: 40 U/L — SIGNIFICANT CHANGE UP (ref 4–40)
BASOPHILS # BLD AUTO: 0.04 K/UL — SIGNIFICANT CHANGE UP (ref 0–0.2)
BASOPHILS NFR BLD AUTO: 0.5 % — SIGNIFICANT CHANGE UP (ref 0–2)
BILIRUB SERPL-MCNC: 0.4 MG/DL — SIGNIFICANT CHANGE UP (ref 0.2–1.2)
BUN SERPL-MCNC: 18 MG/DL — SIGNIFICANT CHANGE UP (ref 7–23)
CALCIUM SERPL-MCNC: 9 MG/DL — SIGNIFICANT CHANGE UP (ref 8.4–10.5)
CHLORIDE SERPL-SCNC: 100 MMOL/L — SIGNIFICANT CHANGE UP (ref 98–107)
CO2 SERPL-SCNC: 22 MMOL/L — SIGNIFICANT CHANGE UP (ref 22–31)
CREAT SERPL-MCNC: 0.95 MG/DL — SIGNIFICANT CHANGE UP (ref 0.5–1.3)
EOSINOPHIL # BLD AUTO: 0.05 K/UL — SIGNIFICANT CHANGE UP (ref 0–0.5)
EOSINOPHIL NFR BLD AUTO: 0.6 % — SIGNIFICANT CHANGE UP (ref 0–6)
GLUCOSE SERPL-MCNC: 141 MG/DL — HIGH (ref 70–99)
HCT VFR BLD CALC: 42.8 % — SIGNIFICANT CHANGE UP (ref 39–50)
HGB BLD-MCNC: 14.7 G/DL — SIGNIFICANT CHANGE UP (ref 13–17)
IMM GRANULOCYTES NFR BLD AUTO: 0.3 % — SIGNIFICANT CHANGE UP (ref 0–1.5)
LYMPHOCYTES # BLD AUTO: 1.03 K/UL — SIGNIFICANT CHANGE UP (ref 1–3.3)
LYMPHOCYTES # BLD AUTO: 13 % — SIGNIFICANT CHANGE UP (ref 13–44)
MCHC RBC-ENTMCNC: 29.5 PG — SIGNIFICANT CHANGE UP (ref 27–34)
MCHC RBC-ENTMCNC: 34.3 % — SIGNIFICANT CHANGE UP (ref 32–36)
MCV RBC AUTO: 85.8 FL — SIGNIFICANT CHANGE UP (ref 80–100)
MONOCYTES # BLD AUTO: 0.51 K/UL — SIGNIFICANT CHANGE UP (ref 0–0.9)
MONOCYTES NFR BLD AUTO: 6.4 % — SIGNIFICANT CHANGE UP (ref 2–14)
NEUTROPHILS # BLD AUTO: 6.28 K/UL — SIGNIFICANT CHANGE UP (ref 1.8–7.4)
NEUTROPHILS NFR BLD AUTO: 79.2 % — HIGH (ref 43–77)
NRBC # FLD: 0 K/UL — SIGNIFICANT CHANGE UP (ref 0–0)
PLATELET # BLD AUTO: 191 K/UL — SIGNIFICANT CHANGE UP (ref 150–400)
PMV BLD: 10.4 FL — SIGNIFICANT CHANGE UP (ref 7–13)
POTASSIUM SERPL-MCNC: 4.1 MMOL/L — SIGNIFICANT CHANGE UP (ref 3.5–5.3)
POTASSIUM SERPL-SCNC: 4.1 MMOL/L — SIGNIFICANT CHANGE UP (ref 3.5–5.3)
PROT SERPL-MCNC: 6.9 G/DL — SIGNIFICANT CHANGE UP (ref 6–8.3)
RBC # BLD: 4.99 M/UL — SIGNIFICANT CHANGE UP (ref 4.2–5.8)
RBC # FLD: 12.7 % — SIGNIFICANT CHANGE UP (ref 10.3–14.5)
SODIUM SERPL-SCNC: 133 MMOL/L — LOW (ref 135–145)
WBC # BLD: 7.93 K/UL — SIGNIFICANT CHANGE UP (ref 3.8–10.5)
WBC # FLD AUTO: 7.93 K/UL — SIGNIFICANT CHANGE UP (ref 3.8–10.5)

## 2019-10-21 PROCEDURE — 99283 EMERGENCY DEPT VISIT LOW MDM: CPT

## 2019-10-21 RX ORDER — MECLIZINE HCL 12.5 MG
1 TABLET ORAL
Qty: 15 | Refills: 0
Start: 2019-10-21 | End: 2019-10-25

## 2019-10-21 RX ORDER — SODIUM CHLORIDE 9 MG/ML
1000 INJECTION INTRAMUSCULAR; INTRAVENOUS; SUBCUTANEOUS ONCE
Refills: 0 | Status: COMPLETED | OUTPATIENT
Start: 2019-10-21 | End: 2019-10-21

## 2019-10-21 RX ORDER — MECLIZINE HCL 12.5 MG
25 TABLET ORAL ONCE
Refills: 0 | Status: COMPLETED | OUTPATIENT
Start: 2019-10-21 | End: 2019-10-21

## 2019-10-21 RX ADMIN — Medication 25 MILLIGRAM(S): at 18:09

## 2019-10-21 RX ADMIN — SODIUM CHLORIDE 1000 MILLILITER(S): 9 INJECTION INTRAMUSCULAR; INTRAVENOUS; SUBCUTANEOUS at 18:09

## 2019-10-21 NOTE — ED ADULT NURSE NOTE - CHPI ED NUR SYMPTOMS NEG
no diaphoresis/no fever/no syncope/no shortness of breath/no chest pain/no chills/no back pain/no congestion

## 2019-10-21 NOTE — ED PROVIDER NOTE - PATIENT PORTAL LINK FT
You can access the FollowMyHealth Patient Portal offered by [facility] by registering at the following website: [email or web address]. By joining [employer]’s FollowMyHealth portal, you will also be able to view your health information using other applications (apps) compatible with our system.

## 2019-10-21 NOTE — ED PROVIDER NOTE - PHYSICAL EXAMINATION
VITALS: reviewed  GEN: NAD, A & O x 4  HEAD/EYES: NCAT, PERRL, EOMI no nystagmus, anicteric sclerae, no conjunctival pallor  ENT: mucus membranes moist, oropharynx WNL, trachea midline  RESP: lungs CTA with equal breath sounds bilaterally, chest wall nontender and atraumatic  CV: heart with reg rhythm S1, S2, distal pulses intact and symmetric bilaterally  ABDOMEN: normoactive bowel sounds, soft, nondistended, nontender, no palpable masses  MSK: extremities atraumatic and nontender, no edema, no asymmetry. the back is without midline or lateral tenderness, there is no spinal deformity or stepoff and the back is ranged painlessly. the neck has no midline tenderness, deformity, or stepoff, and is ranged painlessly.  SKIN: warm, dry, no rash, no bruising, no cyanosis. color appropriate for ethnicity  NEURO: alert, mentating appropriately, no facial asymmetry. gross sensation, motor, coordination are intact. CN III-XII grossly intact, vertigo induced with inferior eye movements  PSYCH: Affect appropriate

## 2019-10-21 NOTE — ED ADULT TRIAGE NOTE - CHIEF COMPLAINT QUOTE
Pt has a hx of stage 4 Hodgkin's Lymphoma complaining of weakness, nausea and vomiting. Pt has hx of stem cell transplant. Pt denies chest pain, SOB.

## 2019-10-21 NOTE — ED PROVIDER NOTE - OBJECTIVE STATEMENT
40 yo M hx stage IV Hodgkin's Lymphoma s/p transplant now in remission, vertigo, presenting with sudden onset dizziness (described as room spinning), while sitting down at work. Symptoms are fatigable, improves with closing his eyes. Associated nausea/vomiting. Denies focal weakness/numbness/tingling. No chest pain, shortness of breath

## 2019-10-21 NOTE — ED PROVIDER NOTE - ATTENDING CONTRIBUTION TO CARE
agree with resident note    "40 yo M hx stage IV Hodgkin's Lymphoma s/p transplant now in remission, vertigo, presenting with sudden onset dizziness (described as room spinning), while sitting down at work. Symptoms are fatigable, improves with closing his eyes. Associated nausea/vomiting. Denies focal weakness/numbness/tingling. No chest pain, shortness of breath"  States lying still in stretcher no symptoms.    PE: not toxic appearing; no nystagmus; no skew; FTN wnl; TY wnl; VSS; CTAB/L; s1 s2 no m/r/g abd soft/NT/ND ext: no edema    Imp: peripheral vertigo; given hx of hodgkins will get head CT

## 2019-10-21 NOTE — H&P ADULT. - ADMIT DATE
10-Mar-2017
Other (Free Text): There is no evidence of an active skin disease upon today’s examination. Because she has been examined by an allergist previously, as well as being examined by me today, I have advised her to follow up with her primary care physician. She states she had an abnormal blood test showing elevated liver enzymes in August so I believe this could be related to an internal issue.
Note Text (......Xxx Chief Complaint.): This diagnosis correlates with the
Detail Level: Detailed

## 2019-10-21 NOTE — ED PROVIDER NOTE - PROGRESS NOTE DETAILS
Eliza Pepper M.D. Resident: Received patient at [facility], reports sx have largely improved, pt ambulating well without assistance, tolerating PO, no N/V, pt has appt tomorrow with his oncologist at [facility], will DC home with meclizine

## 2019-10-21 NOTE — ED ADULT NURSE NOTE - OBJECTIVE STATEMENT
Patient BIBA from work for N/V , palpations, dizziness with generalized weakness started to at work. Patient notes the room spinning when he open his eyes . Denies any pain or discomfort

## 2019-10-21 NOTE — ED PROVIDER NOTE - CLINICAL SUMMARY MEDICAL DECISION MAKING FREE TEXT BOX
40 yo M presenting with vertigo, symptoms are fatiguable, no rotary nystagmus, neuro exam unremarkable, however given cancer hx will CTH r/o mass. Meclizine, fluids, likely dc

## 2019-10-21 NOTE — ED PROVIDER NOTE - NSFOLLOWUPINSTRUCTIONS_ED_ALL_ED_FT
You were seen in the Emergency Department for dizziness, nausea, and vomiting.   1) Advance activity as tolerated.    2) Continue all previously prescribed medications as directed.   your prescription for meclizine and take as needed for dizziness. You may take this up to three times per day.  3) Follow up with your doctors tomorrow. You should also follow up with a neurologist within the week.  4) Return to the Emergency Department for worsening or persistent symptoms, and/or ANY NEW OR CONCERNING SYMPTOMS. If you have issues obtaining follow up, please call: [phone] to obtain a doctor or specialist who takes your insurance in your area.

## 2019-10-21 NOTE — ED ADULT NURSE NOTE - NSIMPLEMENTINTERV_GEN_ALL_ED
Implemented All Fall with Harm Risk Interventions:  [place] to call system. Call bell, personal items and telephone within reach. Instruct patient to call for assistance. Room bathroom lighting operational. Non-slip footwear when patient is off stretcher. Physically safe environment: no spills, clutter or unnecessary equipment. Stretcher in lowest position, wheels locked, appropriate side rails in place. Provide visual cue, wrist band, yellow gown, etc. Monitor gait and stability. Monitor for mental status changes and reorient to person, place, and time. Review medications for side effects contributing to fall risk. Reinforce activity limits and safety measures with patient and family. Provide visual clues: red socks.

## 2019-10-22 PROBLEM — J90 PLEURAL EFFUSION, NOT ELSEWHERE CLASSIFIED: Chronic | Status: ACTIVE | Noted: 2017-03-10

## 2020-01-07 NOTE — DIETITIAN INITIAL EVALUATION ADULT. - NS AS NUTRI INTERV MEDICAL AND FOOD SUPPLEMENTS
Nasal mucosa clear.  Mouth with normal mucosa  Throat has no vesicles, no oropharyngeal exudates and uvula is midline. Pt declines nutritional supplements at this time.

## 2020-03-12 NOTE — ED ADULT NURSE NOTE - MODE OF DISCHARGE
[facility]   Partial Edentulation Procedure Note    Date of Service: 3/11/2020     PROCEDURE:   Partial Edentulation with Anesthesia    PREOPERATIVE DIAGNOSIS:  Periodontal Disease/Dental Decay    POSTOPERATIVE DIAGNOSIS:  Periodontal Disease/Dental Decay    SURGEON:  Ramon Merida DDS.     ASSISTANT:  Ubaldo Guzmán    ANESTHESIA:   General Anesthesia with local anesthesia    ANESTHESIOLOGIST:   Anesthesiologist: Franklyn Mendez MD     DESCRIPTION OF PROCEDURE:  The patient was brought to OR room #7, he was placed under general anesthesia, and the procedure was started. Throat pack placed and irrigated with Chlorhexidine. Antibiotic premedication was administered with 2 grams Ancef..  Local anesthesia administered 170mg 2 % Lidocaine with .085mg epi.  He was draped, and elevation, luxation and delivery of teeth numbers 14,16,17,18,19,31,1,2 was performed with Alveoloplasty. The oral cavity was irrigated with Chlorhexidine Gluconate. Throat pack removed and oral cavity irrigated.The patient was awoken and brought to PACU.    SPECIMEN:  none     COMPLICATIONS:  None    ESTIMATED BLOOD LOSS:  Minimal     Ramon Merida DDS  
Ambulatory

## 2021-06-18 NOTE — H&P ADULT. - PROBLEM/PLAN-2
Daily Note     Today's date: 2021  Patient name: Nikhil Nelson  : 1952  MRN: [id number]  Referring provider: Nimco Leigh MD  Dx:   Encounter Diagnosis     ICD-10-CM    1  Left hip pain  M25 552    2  Osteoarthritis of left hip, unspecified osteoarthritis type  M16 12        Start Time: 0700  Stop Time: 0755  Total time in clinic (min): 55 minutes    Subjective: Patient reports he still gets the sharp pains with certain movements in left hip  Objective: See treatment diary below      Assessment: Tolerated treatment well  Patient would benefit from continued PT  Stiff with tight end range left hip noted  Tight hamstrings bilateral        Plan: Continue per plan of care        Precautions: DJD, OA      Manuals 6/10 6/14 6/18          LB/LE's/ Hams 10' 10 10                                                 Neuro Re-Ed             Side/side  2x 2x blue          SLS   nv           Forw/back tandem  2x 2x          Ther Ex             HEP 10'            B heel slides 30x 30x 30x          L heel slides 30x 30x 30x          bridges 30x 30x 30x          Standing SL hip flex, abd, ext HEP  Hip [id number]          SLR L nv 30x 30x          SLR L w/ER nv 3x10 30x          Hip abd-band 30x 30x 30x          Hip add-ball 30x 30x nt          Clamshell L   30x          Hip abduction SL Left   30x          nustep 5' 6' 7'          Slant board 30"4x 30' 4x L 2 30" 4x          Hip adduction   30# 30x          Hip abduction   30# 30x          Ther Activity                                       Gait Training                                       Modalities
DISPLAY PLAN FREE TEXT

## 2021-08-24 NOTE — ED PROVIDER NOTE - NS ED MD DISPO DISCHARGE CCDA
The patient is a 27y Male complaining of fever. Patient/Caregiver provided printed discharge information.

## 2022-06-01 NOTE — PATIENT PROFILE ADULT. - TEACHING/LEARNING ADDITIONAL COMMENTS OTHER
[Home] : at home, [unfilled] , at the time of the visit. [Other Location: e.g. Home (Enter Location, City,State)___] : at [unfilled] [Verbal consent obtained from patient] : the patient, [unfilled] [Follow-Up Visit] : a follow-up visit for [Obesity] : obesity none

## 2022-09-28 NOTE — ED PROVIDER NOTE - INTERPRETATION
home
normal sinus rhythm, Normal axis, Normal SC interval and QRS complex. There are no acute ischemic ST or T-wave changes.

## 2023-01-01 NOTE — PROVIDER CONTACT NOTE (OTHER) - ASSESSMENT
Juan David Kemp(Attending) Pt c/o of 6/10 pain to right flank @site of pigtail. 3-5 mL output noted in last 3 hours via chest tube.

## 2023-02-15 NOTE — PATIENT PROFILE ADULT. - PATIENT REPRESENTATIVE: ( YOU CAN CHOOSE ANY PERSON THAT CAN ASSIST YOU WITH YOUR HEALTH CARE PREFERENCES, DOES NOT HAVE TO BE A SPOUSE, IMMEDIATE FAMILY OR SIGNIFICANT OTHER/PARTNER)
Yes What Type Of Note Output Would You Prefer (Optional)?: Bullet Format Hpi Title: Evaluation of Skin Lesions How Severe Are Your Spot(S)?: moderate Have Your Spot(S) Been Treated In The Past?: has not been treated

## 2023-06-20 NOTE — PATIENT PROFILE ADULT. - FUNCTIONAL SCREEN CURRENT LEVEL: TRANSFERRING, MLM
Instructions: This plan will send the code FBSE to the PM system.  DO NOT or CHANGE the price.
Detail Level: Generalized
Price (Do Not Change): 0.00
(0) independent

## 2023-10-20 NOTE — DIETITIAN INITIAL EVALUATION ADULT. - PROBLEM SELECTOR PROBLEM 3
-Lunch ordered at this time, given coffee + snack  -R radial site unremarkable, band in place  -Angio bag running per order, as well as NS (see MAR)  -Dr. Worthy Lively spoke to patient and his wife at bedside Anemia

## 2024-02-16 NOTE — PRE-OP CHECKLIST - AS BP NONINV SITE
right upper arm
[FreeTextEntry1] : 68 yo WM, here for f/u of a chronic right upper arm wound that spontaneously opened mos ago along with a 2nd smaller opening.. Had a CAT scan done of the right upper arm which showed a draining abscess. Swab culture done recently showed Staph aur. No SOI. Both wounds have  healed.

## 2024-07-23 NOTE — PROGRESS NOTE ADULT - PROBLEM SELECTOR PROBLEM 1
Addended by: SILVIA SULTANA on: 7/23/2024 09:27 AM     Modules accepted: Orders    
Hodgkin lymphoma of intrapelvic lymph nodes, unspecified Hodgkin lymphoma type

## 2025-02-17 NOTE — ED ADULT NURSE NOTE - PRIMARY CARE PROVIDER
Please have pt schedule for yearly visit in April when due.   oncologist: fidel pulmonologist: sleep
